# Patient Record
Sex: FEMALE | Race: WHITE | NOT HISPANIC OR LATINO | Employment: OTHER | URBAN - METROPOLITAN AREA
[De-identification: names, ages, dates, MRNs, and addresses within clinical notes are randomized per-mention and may not be internally consistent; named-entity substitution may affect disease eponyms.]

---

## 2017-01-13 DIAGNOSIS — R52 PAIN: ICD-10-CM

## 2017-01-13 RX ORDER — MELOXICAM 7.5 MG/1
TABLET ORAL
Qty: 90 TAB | Refills: 0 | Status: SHIPPED | OUTPATIENT
Start: 2017-01-13 | End: 2017-04-10 | Stop reason: SDUPTHER

## 2017-07-18 ENCOUNTER — HOSPITAL ENCOUNTER (OUTPATIENT)
Dept: LAB | Facility: MEDICAL CENTER | Age: 77
End: 2017-07-18
Attending: FAMILY MEDICINE
Payer: MEDICARE

## 2017-07-18 DIAGNOSIS — R73.01 ELEVATED FASTING GLUCOSE: ICD-10-CM

## 2017-07-18 DIAGNOSIS — E78.5 DYSLIPIDEMIA: ICD-10-CM

## 2017-07-18 DIAGNOSIS — D69.6 THROMBOCYTOPENIA (HCC): ICD-10-CM

## 2017-07-18 LAB
ALBUMIN SERPL BCP-MCNC: 4.3 G/DL (ref 3.2–4.9)
ALBUMIN/GLOB SERPL: 1.7 G/DL
ALP SERPL-CCNC: 67 U/L (ref 30–99)
ALT SERPL-CCNC: 18 U/L (ref 2–50)
ANION GAP SERPL CALC-SCNC: 6 MMOL/L (ref 0–11.9)
AST SERPL-CCNC: 20 U/L (ref 12–45)
BASOPHILS # BLD AUTO: 0.8 % (ref 0–1.8)
BASOPHILS # BLD: 0.06 K/UL (ref 0–0.12)
BILIRUB SERPL-MCNC: 1.6 MG/DL (ref 0.1–1.5)
BUN SERPL-MCNC: 25 MG/DL (ref 8–22)
CALCIUM SERPL-MCNC: 9.4 MG/DL (ref 8.5–10.5)
CHLORIDE SERPL-SCNC: 108 MMOL/L (ref 96–112)
CHOLEST SERPL-MCNC: 130 MG/DL (ref 100–199)
CO2 SERPL-SCNC: 27 MMOL/L (ref 20–33)
CREAT SERPL-MCNC: 0.76 MG/DL (ref 0.5–1.4)
EOSINOPHIL # BLD AUTO: 0.08 K/UL (ref 0–0.51)
EOSINOPHIL NFR BLD: 1.1 % (ref 0–6.9)
ERYTHROCYTE [DISTWIDTH] IN BLOOD BY AUTOMATED COUNT: 46.1 FL (ref 35.9–50)
EST. AVERAGE GLUCOSE BLD GHB EST-MCNC: 111 MG/DL
GFR SERPL CREATININE-BSD FRML MDRD: >60 ML/MIN/1.73 M 2
GLOBULIN SER CALC-MCNC: 2.5 G/DL (ref 1.9–3.5)
GLUCOSE SERPL-MCNC: 100 MG/DL (ref 65–99)
HBA1C MFR BLD: 5.5 % (ref 0–5.6)
HCT VFR BLD AUTO: 45.1 % (ref 37–47)
HDLC SERPL-MCNC: 59 MG/DL
HGB BLD-MCNC: 15 G/DL (ref 12–16)
IMM GRANULOCYTES # BLD AUTO: 0.02 K/UL (ref 0–0.11)
IMM GRANULOCYTES NFR BLD AUTO: 0.3 % (ref 0–0.9)
LDLC SERPL CALC-MCNC: 59 MG/DL
LYMPHOCYTES # BLD AUTO: 1.53 K/UL (ref 1–4.8)
LYMPHOCYTES NFR BLD: 21.6 % (ref 22–41)
MCH RBC QN AUTO: 31.1 PG (ref 27–33)
MCHC RBC AUTO-ENTMCNC: 33.3 G/DL (ref 33.6–35)
MCV RBC AUTO: 93.4 FL (ref 81.4–97.8)
MONOCYTES # BLD AUTO: 0.49 K/UL (ref 0–0.85)
MONOCYTES NFR BLD AUTO: 6.9 % (ref 0–13.4)
NEUTROPHILS # BLD AUTO: 4.89 K/UL (ref 2–7.15)
NEUTROPHILS NFR BLD: 69.3 % (ref 44–72)
NRBC # BLD AUTO: 0 K/UL
NRBC BLD AUTO-RTO: 0 /100 WBC
PLATELET # BLD AUTO: 127 K/UL (ref 164–446)
POTASSIUM SERPL-SCNC: 4.3 MMOL/L (ref 3.6–5.5)
PROT SERPL-MCNC: 6.8 G/DL (ref 6–8.2)
RBC # BLD AUTO: 4.83 M/UL (ref 4.2–5.4)
SODIUM SERPL-SCNC: 141 MMOL/L (ref 135–145)
TRIGL SERPL-MCNC: 60 MG/DL (ref 0–149)
WBC # BLD AUTO: 7.1 K/UL (ref 4.8–10.8)

## 2017-07-18 PROCEDURE — 83036 HEMOGLOBIN GLYCOSYLATED A1C: CPT

## 2017-07-18 PROCEDURE — 80061 LIPID PANEL: CPT

## 2017-07-18 PROCEDURE — 85025 COMPLETE CBC W/AUTO DIFF WBC: CPT

## 2017-07-18 PROCEDURE — 36415 COLL VENOUS BLD VENIPUNCTURE: CPT

## 2017-07-18 PROCEDURE — 80053 COMPREHEN METABOLIC PANEL: CPT

## 2017-07-19 ENCOUNTER — TELEPHONE (OUTPATIENT)
Dept: MEDICAL GROUP | Facility: PHYSICIAN GROUP | Age: 77
End: 2017-07-19

## 2017-07-19 NOTE — TELEPHONE ENCOUNTER
----- Message from Tonya Stack M.D. sent at 7/19/2017  8:29 AM PDT -----  Labs are stable.  No changes.

## 2017-11-18 NOTE — TELEPHONE ENCOUNTER
Was the patient seen in the last year in this department?no 09/20/16    Does patient have an active prescription for medications requested? No     Received Request Via: Patient

## 2017-11-20 RX ORDER — ALENDRONATE SODIUM 70 MG/1
70 TABLET ORAL
Qty: 12 TAB | Refills: 0 | Status: SHIPPED | OUTPATIENT
Start: 2017-11-20 | End: 2018-02-13 | Stop reason: SDUPTHER

## 2017-12-20 RX ORDER — ATORVASTATIN CALCIUM 10 MG/1
TABLET, FILM COATED ORAL
Qty: 90 TAB | Refills: 0 | Status: SHIPPED | OUTPATIENT
Start: 2017-12-20 | End: 2018-03-19 | Stop reason: SDUPTHER

## 2017-12-20 NOTE — TELEPHONE ENCOUNTER
Was the patient seen in the last year in this department? No    Does patient have an active prescription for medications requested? No     Received Request Via: Pharmacy      Pt met protocol?: No     OV 9/16       Lab Results  Component Value Date/Time   CHOLSTRLTOT 130 07/18/2017 0927   TRIGLYCERIDE 60 07/18/2017 0927   HDL 59 07/18/2017 0927   LDL 59 07/18/2017 0927

## 2018-02-13 ENCOUNTER — OFFICE VISIT (OUTPATIENT)
Dept: MEDICAL GROUP | Facility: PHYSICIAN GROUP | Age: 78
End: 2018-02-13
Payer: MEDICARE

## 2018-02-13 VITALS
HEIGHT: 67 IN | OXYGEN SATURATION: 99 % | WEIGHT: 114 LBS | DIASTOLIC BLOOD PRESSURE: 82 MMHG | BODY MASS INDEX: 17.89 KG/M2 | RESPIRATION RATE: 14 BRPM | SYSTOLIC BLOOD PRESSURE: 128 MMHG | HEART RATE: 84 BPM | TEMPERATURE: 97.6 F

## 2018-02-13 DIAGNOSIS — M81.0 AGE RELATED OSTEOPOROSIS, UNSPECIFIED PATHOLOGICAL FRACTURE PRESENCE: ICD-10-CM

## 2018-02-13 DIAGNOSIS — Z12.11 SCREENING FOR COLON CANCER: ICD-10-CM

## 2018-02-13 DIAGNOSIS — E78.2 MIXED HYPERLIPIDEMIA: ICD-10-CM

## 2018-02-13 DIAGNOSIS — D69.6 THROMBOCYTOPENIA (HCC): ICD-10-CM

## 2018-02-13 DIAGNOSIS — Z72.0 TOBACCO ABUSE: ICD-10-CM

## 2018-02-13 PROCEDURE — 99213 OFFICE O/P EST LOW 20 MIN: CPT | Performed by: FAMILY MEDICINE

## 2018-02-13 RX ORDER — ALENDRONATE SODIUM 70 MG/1
70 TABLET ORAL
Qty: 12 TAB | Refills: 4 | Status: SHIPPED | OUTPATIENT
Start: 2018-02-13 | End: 2019-04-11 | Stop reason: SDUPTHER

## 2018-02-13 ASSESSMENT — PATIENT HEALTH QUESTIONNAIRE - PHQ9: CLINICAL INTERPRETATION OF PHQ2 SCORE: 0

## 2018-02-13 NOTE — PROGRESS NOTES
Chief Complaint   Patient presents with   • Generalized Osteoarthrosis, Involving Hand     alendronate refill   • Immunizations     pt declined       HISTORY OF PRESENT ILLNESS: Patient is a 77 y.o. female established patient here today for the following concerns:    1. Screening for colon cancer  Due for colon cancer screening. Patient denies any changes in bowel habits no melena or hematochezia noted.    2. Age related osteoporosis, unspecified pathological fracture presence  Patient has history of osteoporosis currently on Fosamax 70 mg every 7 days. Good tolerability no GI side effects. Due for repeat bone density in 2 years. Denies any recent falls or fractures.    3. Mixed hyperlipidemia  Patient has history of hyperlipidemia on atorvastatin 10 mg. She will be due for recheck on her labs in August. Denies any chest pain myalgias.    4. Tobacco abuse  Patient continues to smoke a half a pack to 1 pack per day of cigarettes. She reports that she is still in the precontemplation stage citing that she is not ready to quit.    5. Thrombocytopenia (CMS-HCC)  Patient's history of thrombocytopenia noted on labs which as been chronic. She does occasionally take aspirin. There's been no bleeding or easy bruisability.      Past Medical, Social, and Family history reviewed and updated in EPIC    Allergies:Sulfa drugs    Current Outpatient Prescriptions   Medication Sig Dispense Refill   • Nutritional Supplements (ENSURE ACTIVE PO) Take  by mouth.     • alendronate (FOSAMAX) 70 MG Tab Take 1 Tab by mouth every 7 days. 12 Tab 4   • atorvastatin (LIPITOR) 10 MG Tab TAKE 1 TAB BY MOUTH EVERY DAY. TAKE 1 TAB BY MOUTH EVERY DAY. 90 Tab 0   • meloxicam (MOBIC) 7.5 MG Tab TAKE 1 TABLET BY MOUTH EVERY DAY AS NEEDED FOR MILD PAIN 90 Tab 0   • CALCIUM PO Take  by mouth.     • Cholecalciferol (VITAMIN D3) 3000 UNITS Tab Take  by mouth.     • Iron-Vitamins (GERITOL PO) Take  by mouth.     • meclizine (ANTIVERT) 25 MG TABS Take 1 Tab  "by mouth 3 times a day as needed. Indications: Sensation of Spinning or Whirling 30 Tab 0   • aspirin (ASA) 325 MG TABS Take 325 mg by mouth every day.     • Nutritional Supplements (BOOST HIGH PROTEIN) Liquid Take 1 Can by mouth 2 Times a Day. (Patient taking differently: Take 1 Can by mouth every day.) 60 Can 11     No current facility-administered medications for this visit.          ROS:  Review of Systems   Constitutional: Negative for fever, chills, weight loss and malaise/fatigue.   HENT: Negative for ear pain, nosebleeds, congestion, sore throat and neck pain.    Eyes: Negative for blurred vision.   Respiratory: Negative for cough, sputum production, shortness of breath and wheezing.    Cardiovascular: Negative for chest pain, palpitations,  and leg swelling.   Gastrointestinal: Negative for heartburn, nausea, vomiting, diarrhea and abdominal pain.   Genitourinary: Negative for dysuria, urgency and frequency.   Musculoskeletal: Negative for myalgias, back pain and joint pain.   Skin: Negative for rash and itching.   Neurological: Negative for dizziness, tingling, tremors, sensory change, focal weakness and headaches.   Endo/Heme/Allergies: Does not bruise/bleed easily.   Psychiatric/Behavioral: Negative for depression, anxiety, suicidal ideas, insomnia and memory loss.      Exam:  Blood pressure 128/82, pulse 84, temperature 36.4 °C (97.6 °F), resp. rate 14, height 1.702 m (5' 7.01\"), weight 51.7 kg (114 lb), SpO2 99 %.    General:  Well nourished, well developed in NAD  Head is grossly normal.  Neck: Supple without JVD   Pulmonary:  Normal effort.   Cardiovascular: Regular rate  Extremities: no clubbing, cyanosis, or edema.  Psych: affect appropriate      Please note that this dictation was created using voice recognition software. I have made every reasonable attempt to correct obvious errors, but I expect that there are errors of grammar and possibly content that I did not discover before finalizing the " note.    Assessment/Plan:  1. Screening for colon cancer  - OCCULT BLOOD FECES IMMUNOASSAY (FIT); Future    2. Age related osteoporosis, unspecified pathological fracture presence  Continue  - alendronate (FOSAMAX) 70 MG Tab; Take 1 Tab by mouth every 7 days.  Dispense: 12 Tab; Refill: 4  Check bone density in 2020  3. Mixed hyperlipidemia  Continue atorvastatin check labs  - COMP METABOLIC PANEL; Future  - LIPID PROFILE; Future    4. Tobacco abuse  Counseled today    5. Thrombocytopenia (CMS-HCC)  No evidence of bleeding or bruising, check  - CBC WITH DIFFERENTIAL; Future    Return in about 6 months (around 8/13/2018).

## 2018-02-15 ENCOUNTER — HOSPITAL ENCOUNTER (OUTPATIENT)
Facility: MEDICAL CENTER | Age: 78
End: 2018-02-15
Attending: FAMILY MEDICINE
Payer: MEDICARE

## 2018-02-15 PROCEDURE — 82274 ASSAY TEST FOR BLOOD FECAL: CPT

## 2018-02-22 DIAGNOSIS — Z12.11 SCREENING FOR COLON CANCER: ICD-10-CM

## 2018-02-23 LAB — HEMOCCULT STL QL IA: NEGATIVE

## 2018-02-27 ENCOUNTER — TELEPHONE (OUTPATIENT)
Dept: MEDICAL GROUP | Facility: PHYSICIAN GROUP | Age: 78
End: 2018-02-27

## 2018-02-27 NOTE — TELEPHONE ENCOUNTER
----- Message from Tonya Stack M.D. sent at 2/27/2018  9:30 AM PST -----  Your stool test was negative for microscopic blood.  Please repeat the test in one year.

## 2018-03-07 ENCOUNTER — PATIENT OUTREACH (OUTPATIENT)
Dept: HEALTH INFORMATION MANAGEMENT | Facility: OTHER | Age: 78
End: 2018-03-07

## 2018-03-07 NOTE — PROGRESS NOTES
1. Attempt #: 1    2. HealthConnect Verified: yes    3. Verify PCP: yes    4. Care Team Updated:       •   DME Company (gait device, O2, CPAP, etc.): YES       •   Other Specialists (eye doctor, derm, GYN, cardiology, endo, etc): YES    5.  Reviewed/Updated the following with patient:       •   Communication Preference Obtained? YES       •   Preferred Pharmacy? YES       •   Preferred Lab? YES       •   Family History (document living status of immediate family members and if + hx of cancer, diabetes, hypertension, hyperlipidemia, heart attack, stroke) NO WOULD LIKE TO GO OVER AT TIME OF APPOINTMENT    6. Blink Booking Activation: declined    7. Blink Booking Erasmo: no    8. Annual Wellness Visit Scheduling  Scheduling Status:Scheduled      9. Care Gap Scheduling (Attempt to Schedule EACH Overdue Care Gap!)     Health Maintenance Due   Topic Date Due   • Annual Wellness Visit  10/10/2014        Scheduled patient for Annual Wellness Visit    10. Patient was advised: “This is a free wellness visit. The provider will screen for medical conditions to help you stay healthy. If you have other concerns to address you may be asked to discuss these at a separate visit or there may be an additional fee.”     11. Patient was informed to arrive 15 min prior to their scheduled appointment and bring in their medication bottles.

## 2018-03-20 RX ORDER — ATORVASTATIN CALCIUM 10 MG/1
TABLET, FILM COATED ORAL
Qty: 90 TAB | Refills: 1 | Status: SHIPPED | OUTPATIENT
Start: 2018-03-20 | End: 2018-09-15 | Stop reason: SDUPTHER

## 2018-03-20 NOTE — TELEPHONE ENCOUNTER
Pt has had OV within the 12 month protocol and lipid panel is current. 6 month supply sent to pharmacy.   Lab Results   Component Value Date/Time    CHOLSTRLTOT 130 07/18/2017 09:27 AM    LDL 59 07/18/2017 09:27 AM    HDL 59 07/18/2017 09:27 AM    TRIGLYCERIDE 60 07/18/2017 09:27 AM       Lab Results   Component Value Date/Time    SODIUM 141 07/18/2017 09:27 AM    POTASSIUM 4.3 07/18/2017 09:27 AM    CHLORIDE 108 07/18/2017 09:27 AM    CO2 27 07/18/2017 09:27 AM    GLUCOSE 100 (H) 07/18/2017 09:27 AM    BUN 25 (H) 07/18/2017 09:27 AM    CREATININE 0.76 07/18/2017 09:27 AM     Lab Results   Component Value Date/Time    ALKPHOSPHAT 67 07/18/2017 09:27 AM    ASTSGOT 20 07/18/2017 09:27 AM    ALTSGPT 18 07/18/2017 09:27 AM    TBILIRUBIN 1.6 (H) 07/18/2017 09:27 AM

## 2018-03-20 NOTE — TELEPHONE ENCOUNTER
Was the patient seen in the last year in this department? Yes     Does patient have an active prescription for medications requested? No     Received Request Via: Pharmacy      Pt met protocol?: Yes pt last ov 2/2018   Lab Results  Component Value Date/Time   CHOLSTRLTOT 130 07/18/2017 0927   TRIGLYCERIDE 60 07/18/2017 0927   HDL 59 07/18/2017 0927   LDL 59 07/18/2017 0927

## 2018-03-21 RX ORDER — ATORVASTATIN CALCIUM 10 MG/1
TABLET, FILM COATED ORAL
Refills: 0 | OUTPATIENT
Start: 2018-03-21

## 2018-03-21 NOTE — TELEPHONE ENCOUNTER
* Atorvastatin was refilled today.*  Was the patient seen in the last year in this department? Yes     Does patient have an active prescription for medications requested? No     Received Request Via: Pharmacy    Pt met protocol?: Yes     Last OV 02/2018

## 2018-03-22 RX ORDER — MELOXICAM 7.5 MG/1
TABLET ORAL
Qty: 90 TAB | Refills: 0 | Status: SHIPPED | OUTPATIENT
Start: 2018-03-22 | End: 2019-07-05 | Stop reason: SDUPTHER

## 2018-05-08 NOTE — TELEPHONE ENCOUNTER
Patient came in with spouse to his appointment.  Patient asked about results.  Patient notified of results.

## 2018-06-06 ENCOUNTER — TELEPHONE (OUTPATIENT)
Dept: MEDICAL GROUP | Facility: PHYSICIAN GROUP | Age: 78
End: 2018-06-06

## 2018-06-06 NOTE — TELEPHONE ENCOUNTER
Future Appointments       Provider Department Center    6/12/2018 10:20 AM Tonya Stack M.D.; [facility]  [facility]    8/16/2018 9:40 AM Tonya Stack M.D. [facility]        ANNUAL WELLNESS VISIT PRE-VISIT PLANNING WITH OUTREACH    1.  Immunizations were updated in Epic using WebIZ?:Yes       •  WebIZ Recommendations: FLU, PREVNAR (PCV13) , TDAP and SHINGRIX (Shingles)       •  Is patient due for Tdap? YES. Patient was notified of copay/out of pocket cost.       •  Is patient due for Shingles?YES. Patient was notified of copay/out of pocket cost.    2.  MDX printed for Provider? YES     3.  Reviewed note from last office visit with PCP: YES 02/13/18    4.  If any orders were placed at last visit, do we have Results/Consult Notes?        •  Labs - Labs ordered, NOT completed. Patient advised to complete prior to next appointment.       •  Imaging - Imaging was not ordered at last office visit.       •  Referrals - No referrals were ordered at last office visit.    5.  Patient is due for the following Health Maintenance Topics:   Health Maintenance Due   Topic Date Due   • Annual Wellness Visit  10/10/2014       6.  Patient has the following Care Path diagnoses on Problem List:  NONE

## 2018-06-12 ENCOUNTER — OFFICE VISIT (OUTPATIENT)
Dept: MEDICAL GROUP | Facility: PHYSICIAN GROUP | Age: 78
End: 2018-06-12
Payer: MEDICARE

## 2018-06-12 VITALS
HEART RATE: 94 BPM | SYSTOLIC BLOOD PRESSURE: 134 MMHG | HEIGHT: 67 IN | DIASTOLIC BLOOD PRESSURE: 84 MMHG | WEIGHT: 111 LBS | RESPIRATION RATE: 16 BRPM | BODY MASS INDEX: 17.42 KG/M2 | TEMPERATURE: 98.4 F | OXYGEN SATURATION: 97 %

## 2018-06-12 DIAGNOSIS — Z72.0 TOBACCO ABUSE: ICD-10-CM

## 2018-06-12 DIAGNOSIS — R73.01 ELEVATED FASTING GLUCOSE: ICD-10-CM

## 2018-06-12 DIAGNOSIS — M15.9 PRIMARY OSTEOARTHRITIS INVOLVING MULTIPLE JOINTS: ICD-10-CM

## 2018-06-12 DIAGNOSIS — E78.5 DYSLIPIDEMIA: ICD-10-CM

## 2018-06-12 DIAGNOSIS — E55.9 VITAMIN D DEFICIENCY: ICD-10-CM

## 2018-06-12 DIAGNOSIS — M81.0 AGE RELATED OSTEOPOROSIS, UNSPECIFIED PATHOLOGICAL FRACTURE PRESENCE: ICD-10-CM

## 2018-06-12 DIAGNOSIS — D69.6 THROMBOCYTOPENIA (HCC): ICD-10-CM

## 2018-06-12 PROCEDURE — G0439 PPPS, SUBSEQ VISIT: HCPCS | Performed by: FAMILY MEDICINE

## 2018-06-12 ASSESSMENT — PATIENT HEALTH QUESTIONNAIRE - PHQ9: CLINICAL INTERPRETATION OF PHQ2 SCORE: 0

## 2018-06-12 ASSESSMENT — ENCOUNTER SYMPTOMS: GENERAL WELL-BEING: GOOD

## 2018-06-12 ASSESSMENT — ACTIVITIES OF DAILY LIVING (ADL): BATHING_REQUIRES_ASSISTANCE: 0

## 2018-06-12 NOTE — PROGRESS NOTES
Chief Complaint   Patient presents with   • Annual Exam         HPI:  Mayra is a 77 y.o. here for Medicare Annual Wellness Visit        Patient Active Problem List    Diagnosis Date Noted   • Thrombocytopenia (HCC) 02/15/2013     Priority: Medium   • Tobacco abuse 09/20/2016   • Vitamin D deficiency 11/18/2015   • Osteoporosis 11/11/2015   • Elevated fasting glucose 11/11/2015   • Dyslipidemia 02/15/2013   • Osteoarthritis 02/06/2013       Current Outpatient Prescriptions   Medication Sig Dispense Refill   • aspirin EC (ECOTRIN) 81 MG Tablet Delayed Response Take 81 mg by mouth every day.     • meloxicam (MOBIC) 7.5 MG Tab TAKE 1 TABLET BY MOUTH EVERY DAY AS NEEDED FOR MILD PAIN 90 Tab 0   • atorvastatin (LIPITOR) 10 MG Tab TAKE 1 TABLET BY MOUTH EVERY DAY. 90 Tab 1   • Nutritional Supplements (ENSURE ACTIVE PO) Take  by mouth.     • alendronate (FOSAMAX) 70 MG Tab Take 1 Tab by mouth every 7 days. 12 Tab 4   • CALCIUM PO Take  by mouth.     • Cholecalciferol (VITAMIN D3) 3000 UNITS Tab Take  by mouth.     • Iron-Vitamins (GERITOL PO) Take  by mouth.     • meclizine (ANTIVERT) 25 MG TABS Take 1 Tab by mouth 3 times a day as needed. Indications: Sensation of Spinning or Whirling 30 Tab 0   • Nutritional Supplements (BOOST HIGH PROTEIN) Liquid Take 1 Can by mouth 2 Times a Day. (Patient taking differently: Take 1 Can by mouth every day.) 60 Can 11   • aspirin (ASA) 325 MG TABS Take 325 mg by mouth every day.       No current facility-administered medications for this visit.         Patient is taking medications as noted in medication list.  Current supplements as per medication list.     Allergies: Sulfa drugs    Current social contact/activities: none     Is patient current with immunizations? Yes.    She  reports that she has been smoking Cigarettes.  She has a 88.50 pack-year smoking history. She has never used smokeless tobacco. She reports that she does not drink alcohol or use drugs.  Ready to quit: Not  Answered  Counseling given: Not Answered        DPA/Advanced directive: Patient does not have an Advanced Directive.  A packet and workshop information was given on Advanced Directives.    ROS:    Gait: Uses no assistive device   Ostomy: no   Other tubes: no   Amputations: no   Chronic oxygen use no   Last eye exam pt does not remember   Wears hearing aids: no   : Denies any urinary leakage during the last 6 months      Screening:        Depression Screening    Little interest or pleasure in doing things?  0 - not at all  Feeling down, depressed, or hopeless? 0 - not at all  Patient Health Questionnaire Score: 0    If depressive symptoms identified deferred to follow up visit unless specifically addressed in assessment and plan.    Interpretation of PHQ-9 Total Score   Score Severity   1-4 No Depression   5-9 Mild Depression   10-14 Moderate Depression   15-19 Moderately Severe Depression   20-27 Severe Depression    Screening for Cognitive Impairment    Three Minute Recall (leader, season, table)  3/3    Emilio clock face with all 12 numbers and set the hands to show 10 past 11.  Yes    If cognitive concerns identified, deferred for follow up unless specifically addressed in assessment and plan.    Fall Risk Assessment    Has the patient had two or more falls in the last year or any fall with injury in the last year?  No  If fall risk identified, deferred for follow up unless specifically addressed in assessment and plan.    Safety Assessment    Throw rugs on floor.  No  Handrails on all stairs.  Yes  Good lighting in all hallways.  Yes  Difficulty hearing.  No  Patient counseled about all safety risks that were identified.    Functional Assessment ADLs    Are there any barriers preventing you from cooking for yourself or meeting nutritional needs?  No.    Are there any barriers preventing you from driving safely or obtaining transportation?  No.    Are there any barriers preventing you from using a telephone or  calling for help?  No.    Are there any barriers preventing you from shopping?  No.    Are there any barriers preventing you from taking care of your own finances?  No.    Are there any barriers preventing you from managing your medications?  No.    Are there any barriers preventing you from showering, bathing or dressing yourself?  No.    Are you currently engaging in any exercise or physical activity?  Yes.  walking  What is your perception of your health?  Good.    Health Maintenance Summary                Annual Wellness Visit Overdue 10/10/2014      Done 10/9/2013     IMM INFLUENZA Postponed 10/16/2018 Originally 9/1/2018. Patient Refused     Patient Declined 11/11/2015      Patient has more history with this topic...    IMM DTaP/Tdap/Td Vaccine Postponed 2/11/2020 Originally 9[birth year]3/1959. Patient Refused    IMM PNEUMOCOCCAL 65+ (ADULT) LOW/MEDIUM RISK SERIES Postponed 10/8/2020 Originally 9/3/2006. Patient Refused    COLON CANCER SCREENING ANNUAL FIT Next Due 2/15/2019      Done 2/15/2018 OCCULT BLOOD FECES IMMUNOASSAY     Patient has more history with this topic...    BONE DENSITY Next Due 9/10/2020      Done 9/10/2015 DS-BONE DENSITY STUDY (DEXA)          Patient Care Team:  Tonya Stack M.D. as PCP - General (Family Medicine)    Social History   Substance Use Topics   • Smoking status: Current Every Day Smoker     Packs/day: 1.50     Years: 59.00     Types: Cigarettes   • Smokeless tobacco: Never Used   • Alcohol use No     Family History   Problem Relation Age of Onset   • Heart Disease Mother      MI   • Hypertension Mother    • Cancer Father      colon   • Heart Disease Sister      pacer placed   • Stroke Brother 50   • Heart Disease Brother 60     pace maker     She  has a past medical history of Dyslipidemia (2/15/2013); Osteoarthritis (2/6/2013); Osteoporosis; and Thrombocytopenia (HCC) (2/15/2013).   Past Surgical History:   Procedure Laterality Date   • ORIF, HUMERUS      age 9   • TUBAL COAGULATION  "LAPAROSCOPIC BILATERAL             Exam:     Blood pressure 134/84, pulse 94, temperature 36.9 °C (98.4 °F), resp. rate 16, height 1.702 m (5' 7.01\"), weight 50.3 kg (111 lb), SpO2 97 %. Body mass index is 17.38 kg/m².    Hearing excellent.    Dentition good  Alert, oriented in no acute distress.  Eye contact is good, speech goal directed, affect calm      Assessment and Plan. The following treatment and monitoring plan is recommended:    1. Dyslipidemia  Continue atorvastatin, at least annual labs.     2. Elevated fasting glucose  Noted. contineu to monitor.  No s/sx of DM2    3. Primary osteoarthritis involving multiple joints  Controlled with meloxicam, monitor renal function.    4. Age related osteoporosis, unspecified pathological fracture presence  Continue fosamax    5. Thrombocytopenia (HCC)  Stable.  No evidence of bleeding.  May need to watch NSAIDS for bleeding risk.     6. Tobacco abuse  Counseled.     7. Vitamin D deficiency  Continue OTC vit D supplement    Services suggested: No services needed at this time  Health Care Screening recommendations as per orders if indicated.  Referrals offered: PT/OT/Nutrition counseling/Behavioral Health/Smoking cessation as per orders if indicated.    Discussion today about general wellness and lifestyle habits:    · Prevent falls and reduce trip hazards; Cautioned about securing or removing rugs.  · Have a working fire alarm and carbon monoxide detector;   · Engage in regular physical activity and social activities       Follow-up: 6-12 months.     "

## 2018-07-23 ENCOUNTER — HOSPITAL ENCOUNTER (OUTPATIENT)
Dept: LAB | Facility: MEDICAL CENTER | Age: 78
End: 2018-07-23
Attending: FAMILY MEDICINE
Payer: MEDICARE

## 2018-07-23 DIAGNOSIS — D69.6 THROMBOCYTOPENIA (HCC): ICD-10-CM

## 2018-07-23 DIAGNOSIS — E78.2 MIXED HYPERLIPIDEMIA: ICD-10-CM

## 2018-07-23 LAB
ALBUMIN SERPL BCP-MCNC: 4.4 G/DL (ref 3.2–4.9)
ALBUMIN/GLOB SERPL: 2.2 G/DL
ALP SERPL-CCNC: 62 U/L (ref 30–99)
ALT SERPL-CCNC: 13 U/L (ref 2–50)
ANION GAP SERPL CALC-SCNC: 7 MMOL/L (ref 0–11.9)
AST SERPL-CCNC: 18 U/L (ref 12–45)
BASOPHILS # BLD AUTO: 0.7 % (ref 0–1.8)
BASOPHILS # BLD: 0.06 K/UL (ref 0–0.12)
BILIRUB SERPL-MCNC: 1.5 MG/DL (ref 0.1–1.5)
BUN SERPL-MCNC: 21 MG/DL (ref 8–22)
CALCIUM SERPL-MCNC: 9 MG/DL (ref 8.5–10.5)
CHLORIDE SERPL-SCNC: 108 MMOL/L (ref 96–112)
CHOLEST SERPL-MCNC: 123 MG/DL (ref 100–199)
CO2 SERPL-SCNC: 28 MMOL/L (ref 20–33)
CREAT SERPL-MCNC: 0.76 MG/DL (ref 0.5–1.4)
EOSINOPHIL # BLD AUTO: 0.08 K/UL (ref 0–0.51)
EOSINOPHIL NFR BLD: 1 % (ref 0–6.9)
ERYTHROCYTE [DISTWIDTH] IN BLOOD BY AUTOMATED COUNT: 46.4 FL (ref 35.9–50)
GLOBULIN SER CALC-MCNC: 2 G/DL (ref 1.9–3.5)
GLUCOSE SERPL-MCNC: 99 MG/DL (ref 65–99)
HCT VFR BLD AUTO: 44.1 % (ref 37–47)
HDLC SERPL-MCNC: 54 MG/DL
HGB BLD-MCNC: 14.3 G/DL (ref 12–16)
IMM GRANULOCYTES # BLD AUTO: 0.03 K/UL (ref 0–0.11)
IMM GRANULOCYTES NFR BLD AUTO: 0.4 % (ref 0–0.9)
LDLC SERPL CALC-MCNC: 56 MG/DL
LYMPHOCYTES # BLD AUTO: 1.32 K/UL (ref 1–4.8)
LYMPHOCYTES NFR BLD: 16.3 % (ref 22–41)
MCH RBC QN AUTO: 30.5 PG (ref 27–33)
MCHC RBC AUTO-ENTMCNC: 32.4 G/DL (ref 33.6–35)
MCV RBC AUTO: 94 FL (ref 81.4–97.8)
MONOCYTES # BLD AUTO: 0.53 K/UL (ref 0–0.85)
MONOCYTES NFR BLD AUTO: 6.5 % (ref 0–13.4)
NEUTROPHILS # BLD AUTO: 6.1 K/UL (ref 2–7.15)
NEUTROPHILS NFR BLD: 75.1 % (ref 44–72)
NRBC # BLD AUTO: 0 K/UL
NRBC BLD-RTO: 0 /100 WBC
PLATELET # BLD AUTO: 137 K/UL (ref 164–446)
PMV BLD AUTO: 14.1 FL (ref 9–12.9)
POTASSIUM SERPL-SCNC: 4.1 MMOL/L (ref 3.6–5.5)
PROT SERPL-MCNC: 6.4 G/DL (ref 6–8.2)
RBC # BLD AUTO: 4.69 M/UL (ref 4.2–5.4)
SODIUM SERPL-SCNC: 143 MMOL/L (ref 135–145)
TRIGL SERPL-MCNC: 63 MG/DL (ref 0–149)
WBC # BLD AUTO: 8.1 K/UL (ref 4.8–10.8)

## 2018-07-23 PROCEDURE — 80061 LIPID PANEL: CPT

## 2018-07-23 PROCEDURE — 36415 COLL VENOUS BLD VENIPUNCTURE: CPT

## 2018-07-23 PROCEDURE — 85025 COMPLETE CBC W/AUTO DIFF WBC: CPT

## 2018-07-23 PROCEDURE — 80053 COMPREHEN METABOLIC PANEL: CPT

## 2018-07-24 ENCOUNTER — TELEPHONE (OUTPATIENT)
Dept: MEDICAL GROUP | Facility: PHYSICIAN GROUP | Age: 78
End: 2018-07-24

## 2018-09-17 RX ORDER — ATORVASTATIN CALCIUM 10 MG/1
TABLET, FILM COATED ORAL
Qty: 90 TAB | Refills: 1 | Status: SHIPPED | OUTPATIENT
Start: 2018-09-17 | End: 2019-03-25 | Stop reason: SDUPTHER

## 2018-09-17 NOTE — TELEPHONE ENCOUNTER
Pt has had OV within the 12 month protocol and lipid panel is current. 6 month supply sent to pharmacy.   Lab Results   Component Value Date/Time    CHOLSTRLTOT 123 07/23/2018 08:49 AM    LDL 56 07/23/2018 08:49 AM    HDL 54 07/23/2018 08:49 AM    TRIGLYCERIDE 63 07/23/2018 08:49 AM       Lab Results   Component Value Date/Time    SODIUM 143 07/23/2018 08:49 AM    POTASSIUM 4.1 07/23/2018 08:49 AM    CHLORIDE 108 07/23/2018 08:49 AM    CO2 28 07/23/2018 08:49 AM    GLUCOSE 99 07/23/2018 08:49 AM    BUN 21 07/23/2018 08:49 AM    CREATININE 0.76 07/23/2018 08:49 AM     Lab Results   Component Value Date/Time    ALKPHOSPHAT 62 07/23/2018 08:49 AM    ASTSGOT 18 07/23/2018 08:49 AM    ALTSGPT 13 07/23/2018 08:49 AM    TBILIRUBIN 1.5 07/23/2018 08:49 AM

## 2019-03-26 RX ORDER — ATORVASTATIN CALCIUM 10 MG/1
TABLET, FILM COATED ORAL
Qty: 90 TAB | Refills: 1 | Status: SHIPPED | OUTPATIENT
Start: 2019-03-26 | End: 2019-04-29 | Stop reason: SDUPTHER

## 2019-04-11 DIAGNOSIS — M81.0 AGE RELATED OSTEOPOROSIS, UNSPECIFIED PATHOLOGICAL FRACTURE PRESENCE: ICD-10-CM

## 2019-04-11 NOTE — TELEPHONE ENCOUNTER
Was the patient seen in the last year in this department? Yes    Does patient have an active prescription for medications requested? No     Received Request Via: Pharmacy    Pt met protocol?: No     Last OV 06/12/2018

## 2019-04-12 RX ORDER — ALENDRONATE SODIUM 70 MG/1
70 TABLET ORAL
Qty: 12 TAB | Refills: 1 | Status: SHIPPED | OUTPATIENT
Start: 2019-04-12 | End: 2019-09-23 | Stop reason: SDUPTHER

## 2019-04-18 ENCOUNTER — TELEPHONE (OUTPATIENT)
Dept: MEDICAL GROUP | Facility: PHYSICIAN GROUP | Age: 79
End: 2019-04-18

## 2019-04-18 NOTE — TELEPHONE ENCOUNTER
1. Caller Name: Mayra                                           Call Back Number: [phone] (home)         Patient approves a detailed voicemail message: N\A    LVM for pt to call the office to schedule AWV and to go over care gaps.

## 2019-04-29 NOTE — TELEPHONE ENCOUNTER
Was the patient seen in the last year in this department? Yes    Does patient have an active prescription for medications requested? Yes  [employer] asking for 100 day supply    Received Request Via: Insurance

## 2019-04-30 NOTE — TELEPHONE ENCOUNTER
See MA's notes below, pt has met protocol, OV 06/12/2018      Lab Results  Component Value Date/Time   CHOLSTRLTOT 123 07/23/2018 0849   TRIGLYCERIDE 63 07/23/2018 0849   HDL 54 07/23/2018 0849   LDL 56 07/23/2018 0849

## 2019-05-01 RX ORDER — ATORVASTATIN CALCIUM 10 MG/1
10 TABLET, FILM COATED ORAL
Qty: 100 TAB | Refills: 0 | Status: SHIPPED | OUTPATIENT
Start: 2019-05-01 | End: 2019-10-02 | Stop reason: SDUPTHER

## 2019-05-01 NOTE — TELEPHONE ENCOUNTER
Insurance covers 100 day supply. RX sent with request to inactivate any orders for 30 or 90 day supply.

## 2019-05-03 ENCOUNTER — PATIENT OUTREACH (OUTPATIENT)
Dept: HEALTH INFORMATION MANAGEMENT | Facility: OTHER | Age: 79
End: 2019-05-03

## 2019-05-03 NOTE — PROGRESS NOTES
Outcome: Left Message    Please transfer to Patient Outreach Team at [phone] when patient returns call.    WebIZ Checked & Epic Updated:  yes  PCV-13 (Prevnar 13)   CPOX (Varicella)   Influenza w/preserv.   Tdap   Zoster Franco (Shingrix)       HealthConnect Verified: yes    Attempt # 1

## 2019-07-05 RX ORDER — MELOXICAM 7.5 MG/1
7.5 TABLET ORAL DAILY
Qty: 90 TAB | Refills: 1 | Status: SHIPPED | OUTPATIENT
Start: 2019-07-05 | End: 2020-01-03

## 2019-09-23 DIAGNOSIS — M81.0 AGE RELATED OSTEOPOROSIS, UNSPECIFIED PATHOLOGICAL FRACTURE PRESENCE: ICD-10-CM

## 2019-09-23 RX ORDER — ALENDRONATE SODIUM 70 MG/1
70 TABLET ORAL
Qty: 12 TAB | Refills: 0 | Status: SHIPPED | OUTPATIENT
Start: 2019-09-23 | End: 2019-12-13

## 2019-09-23 NOTE — TELEPHONE ENCOUNTER
Was the patient seen in the last year in this department? No     Does patient have an active prescription for medications requested? No     Received Request Via: Pharmacy      Pt met protocol?: No   Pt last ov 6/2018   Lab Results   Component Value Date/Time    SODIUM 143 07/23/2018 08:49 AM    POTASSIUM 4.1 07/23/2018 08:49 AM    CHLORIDE 108 07/23/2018 08:49 AM    CO2 28 07/23/2018 08:49 AM    GLUCOSE 99 07/23/2018 08:49 AM    BUN 21 07/23/2018 08:49 AM    CREATININE 0.76 07/23/2018 08:49 AM

## 2019-10-02 DIAGNOSIS — D69.6 THROMBOCYTOPENIA (HCC): ICD-10-CM

## 2019-10-02 DIAGNOSIS — E78.5 DYSLIPIDEMIA: ICD-10-CM

## 2019-10-02 NOTE — TELEPHONE ENCOUNTER
Was the patient seen in the last year in this department? No     Does patient have an active prescription for medications requested? No     Received Request Via: Pharmacy    Pt met protocol?: No     Last OV 06/12/18    Lab Results   Component Value Date/Time    CHOLSTRLTOT 123 07/23/2018 0849    TRIGLYCERIDE 63 07/23/2018 0849    HDL 54 07/23/2018 0849    LDL 56 07/23/2018 0849

## 2019-10-03 RX ORDER — ATORVASTATIN CALCIUM 10 MG/1
TABLET, FILM COATED ORAL
Qty: 90 TAB | Refills: 0 | Status: SHIPPED | OUTPATIENT
Start: 2019-10-03 | End: 2019-10-08 | Stop reason: SDUPTHER

## 2019-10-07 ENCOUNTER — TELEPHONE (OUTPATIENT)
Dept: MEDICAL GROUP | Facility: PHYSICIAN GROUP | Age: 79
End: 2019-10-07

## 2019-10-07 NOTE — TELEPHONE ENCOUNTER
Future Appointments       Provider Department Center    10/16/2019 9:00 AM Tonya Stack M.D.; [facility]        ANNUAL WELLNESS VISIT PRE-VISIT PLANNING WITHOUT OUTREACH    1.  Reviewed note from last office visit with PCP: YES    2.  If any orders were placed at last visit, do we have Results/Consult Notes?        •  Labs - Labs ordered, NOT completed. Patient advised to complete prior to next appointment.        •  Imaging - Imaging was not ordered at last office visit.       •  Referrals - No referrals were ordered at last office visit.    3.  Immunizations were updated in [place] using WebIZ?: Yes       •  WebIZ Recommendations: FLU, PREVNAR (PCV13) , TDAP, SHINGRIX (Shingles) and CPOX        •  Is patient due for Tdap? NO       •  Is patient due for Shingrix? YES. Patient was not notified of copay/out of pocket cost.     4.  Patient is due for the following Health Maintenance Topics:   Health Maintenance Due   Topic Date Due   • IMM ZOSTER VACCINES (1 of 2) 09/03/1991   • IMM PNEUMOCOCCAL VACCINE: 65+ Years (1 of 2 - PCV13) 09/03/2006   • Annual Wellness Visit  10/10/2014   • COLON CANCER SCREENING ANNUAL FIT  02/15/2019   • IMM INFLUENZA (1) 09/01/2019     5.  Reviewed/Updated the following with patient:       •   Preferred Pharmacy? YES       •   Preferred Lab? YES       •   Preferred Communication? YES       •   Allergies? YES       •   Medications? YES. Was Abstract Encounter opened and chart updated? YES       •   Social History? YES. Was Abstract Encounter opened and chart updated? YES       •   Family History (document living status of immediate family members and if + hx of  cancer, diabetes, hypertension, hyperlipidemia, heart attack, stroke) YES. Was Abstract Encounter opened and chart updated? YES    6.  Care Team Updated:       •   DME Company (gait device, O2, CPAP, etc.): NO       •   Other Specialists (eye doctor, derm, GYN, cardiology, endo,  etc): YES    7. Orders for overdue Health Maintenance topics pended in Pre-Charting? NO    8.  Patient has the following Care Path diagnoses on Problem List:  NONE    9.  Patient was advised: “This is a free wellness visit. The provider will screen for medical conditions to help you stay healthy. If you have other concerns to address you may be asked to discuss these at a separate visit or there may be an additional fee.”     10.  Patient was informed to arrive 15 min prior to their scheduled appointment and bring in their medication bottles.  10/09/2019-ALEC

## 2019-10-08 NOTE — TELEPHONE ENCOUNTER
*PER NEW INS PROTOCOL, NEEDS TO BE DONE FOR 100DAYS SUPPLY*  Was the patient seen in the last year in this department? No     Does patient have an active prescription for medications requested? Yes    Received Request Via: Pharmacy      Pt met protocol?: Yes    LAST OV 06/12/2018, NEXT APPT 10/16/2019    Lab Results   Component Value Date/Time    CHOLSTRLTOT 123 07/23/2018 08:49 AM    LDL 56 07/23/2018 08:49 AM    HDL 54 07/23/2018 08:49 AM    TRIGLYCERIDE 63 07/23/2018 08:49 AM       Lab Results   Component Value Date/Time    SODIUM 143 07/23/2018 08:49 AM    POTASSIUM 4.1 07/23/2018 08:49 AM    CHLORIDE 108 07/23/2018 08:49 AM    CO2 28 07/23/2018 08:49 AM    GLUCOSE 99 07/23/2018 08:49 AM    BUN 21 07/23/2018 08:49 AM    CREATININE 0.76 07/23/2018 08:49 AM     Lab Results   Component Value Date/Time    ALKPHOSPHAT 62 07/23/2018 08:49 AM    ASTSGOT 18 07/23/2018 08:49 AM    ALTSGPT 13 07/23/2018 08:49 AM    TBILIRUBIN 1.5 07/23/2018 08:49 AM

## 2019-10-09 RX ORDER — ATORVASTATIN CALCIUM 10 MG/1
10 TABLET, FILM COATED ORAL
Qty: 100 TAB | Refills: 0 | Status: SHIPPED | OUTPATIENT
Start: 2019-10-09 | End: 2020-01-20

## 2019-10-16 ENCOUNTER — OFFICE VISIT (OUTPATIENT)
Dept: MEDICAL GROUP | Facility: PHYSICIAN GROUP | Age: 79
End: 2019-10-16
Payer: MEDICARE

## 2019-10-16 VITALS
WEIGHT: 110 LBS | HEART RATE: 76 BPM | DIASTOLIC BLOOD PRESSURE: 72 MMHG | OXYGEN SATURATION: 95 % | RESPIRATION RATE: 18 BRPM | HEIGHT: 67 IN | BODY MASS INDEX: 17.27 KG/M2 | TEMPERATURE: 99 F | SYSTOLIC BLOOD PRESSURE: 130 MMHG

## 2019-10-16 DIAGNOSIS — M81.0 AGE RELATED OSTEOPOROSIS, UNSPECIFIED PATHOLOGICAL FRACTURE PRESENCE: ICD-10-CM

## 2019-10-16 DIAGNOSIS — M15.9 PRIMARY OSTEOARTHRITIS INVOLVING MULTIPLE JOINTS: ICD-10-CM

## 2019-10-16 DIAGNOSIS — R73.01 ELEVATED FASTING GLUCOSE: ICD-10-CM

## 2019-10-16 DIAGNOSIS — D69.6 THROMBOCYTOPENIA (HCC): ICD-10-CM

## 2019-10-16 DIAGNOSIS — E55.9 VITAMIN D DEFICIENCY: ICD-10-CM

## 2019-10-16 DIAGNOSIS — E78.5 DYSLIPIDEMIA: ICD-10-CM

## 2019-10-16 DIAGNOSIS — Z72.0 TOBACCO ABUSE: ICD-10-CM

## 2019-10-16 DIAGNOSIS — Z12.11 SCREENING FOR COLON CANCER: ICD-10-CM

## 2019-10-16 PROCEDURE — G0439 PPPS, SUBSEQ VISIT: HCPCS | Performed by: FAMILY MEDICINE

## 2019-10-16 ASSESSMENT — ENCOUNTER SYMPTOMS: GENERAL WELL-BEING: GOOD

## 2019-10-16 ASSESSMENT — PATIENT HEALTH QUESTIONNAIRE - PHQ9: CLINICAL INTERPRETATION OF PHQ2 SCORE: 0

## 2019-10-16 ASSESSMENT — ACTIVITIES OF DAILY LIVING (ADL): BATHING_REQUIRES_ASSISTANCE: 0

## 2019-10-16 NOTE — PROGRESS NOTES
Chief Complaint   Patient presents with   • Annual Wellness Visit         HPI:  Vargas is a 79 y.o. here for Medicare Annual Wellness Visit      Patient Active Problem List    Diagnosis Date Noted   • Thrombocytopenia (HCC) 02/15/2013     Priority: Medium   • Tobacco abuse 09/20/2016   • Vitamin D deficiency 11/18/2015   • Osteoporosis 11/11/2015   • Elevated fasting glucose 11/11/2015   • Dyslipidemia 02/15/2013   • Osteoarthritis 02/06/2013       Current Outpatient Medications   Medication Sig Dispense Refill   • atorvastatin (LIPITOR) 10 MG Tab Take 1 Tab by mouth every day. 100 Tab 0   • alendronate (FOSAMAX) 70 MG Tab TAKE 1 TAB BY MOUTH EVERY 7 DAYS. 12 Tab 0   • meloxicam (MOBIC) 7.5 MG Tab Take 1 Tab by mouth every day. TAKE 1 TABLET BY MOUTH EVERY DAY AS NEEDED FOR MILD PAIN 90 Tab 1   • aspirin EC (ECOTRIN) 81 MG Tablet Delayed Response Take 81 mg by mouth every day.     • Nutritional Supplements (ENSURE ACTIVE PO) Take  by mouth.     • CALCIUM PO Take  by mouth.     • Cholecalciferol (VITAMIN D3) 3000 UNITS Tab Take  by mouth.     • Iron-Vitamins (GERITOL PO) Take  by mouth.     • meclizine (ANTIVERT) 25 MG TABS Take 1 Tab by mouth 3 times a day as needed. Indications: Sensation of Spinning or Whirling (Patient not taking: Reported on 10/10/2019) 30 Tab 0     No current facility-administered medications for this visit.         Patient is taking medications as noted in medication list.  Current supplements as per medication list.     Allergies: Sulfa drugs    Current social contact/activities: Visiting with friends and family, feeds stray cats,     Is patient current with immunizations? No, due for FLU and PREVNAR (PCV13) . Patient is interested in receiving NONE today.    She  reports that she has been smoking cigarettes. She has a 88.50 pack-year smoking history. She has never used smokeless tobacco. She reports that she does not drink alcohol or use drugs.  Ready to quit: Not Answered  Counseling given:  Not Answered        DPA/Advanced directive: Patient has Advanced Directive on file.     ROS:    Gait: Uses no assistive device   Ostomy: No   Other tubes: No   Amputations: No   Chronic oxygen use No   Last eye exam Unknown  Wears hearing aids: No   : Denies any urinary leakage during the last 6 months  Annual Health Assessment Questions:    1.  Are you currently engaging in any exercise or physical activity? No    2.  How would you describe your mood or emotional well-being today? good    3.  Have you had any falls in the last year? No    4.  Have you noticed any problems with your balance or had difficulty walking? No    5.  In the last six months have you experienced any leakage of urine? No    6. DPA/Advanced Directive: Patient has Advanced Directive on file.     Screening:        Depression Screening    Little interest or pleasure in doing things?  0 - not at all  Feeling down, depressed, or hopeless? 0 - not at all  Patient Health Questionnaire Score: 0    If depressive symptoms identified deferred to follow up visit unless specifically addressed in assessment and plan.    Interpretation of PHQ-9 Total Score   Score Severity   1-4 No Depression   5-9 Mild Depression   10-14 Moderate Depression   15-19 Moderately Severe Depression   20-27 Severe Depression    Screening for Cognitive Impairment    Three Minute Recall (village, kitchen, baby)  3/3 Village, Kitchen, Baby  Emilio clock face with all 12 numbers and set the hands to show 10 past 10.  Yes 10:10 5/5  If cognitive concerns identified, deferred for follow up unless specifically addressed in assessment and plan.    Fall Risk Assessment    Has the patient had two or more falls in the last year or any fall with injury in the last year?  No  If fall risk identified, deferred for follow up unless specifically addressed in assessment and plan.    Safety Assessment    Throw rugs on floor.  Yes  Handrails on all stairs.  Yes  Good lighting in all hallways.   Yes  Difficulty hearing.  No  Patient counseled about all safety risks that were identified.    Functional Assessment ADLs    Are there any barriers preventing you from cooking for yourself or meeting nutritional needs?  No.    Are there any barriers preventing you from driving safely or obtaining transportation?  No.    Are there any barriers preventing you from using a telephone or calling for help?  No.    Are there any barriers preventing you from shopping?  No.    Are there any barriers preventing you from taking care of your own finances?  No.    Are there any barriers preventing you from managing your medications?  No.    Are there any barriers preventing you from showering, bathing or dressing yourself?  No.    Are you currently engaging in any exercise or physical activity?  Yes.  House work and takes care of , walking around the house.  What is your perception of your health?  Good.    Health Maintenance Summary                IMM ZOSTER VACCINES Overdue 9/3/1991     IMM PNEUMOCOCCAL VACCINE: 65+ Years Overdue 9/3/2006     Annual Wellness Visit Overdue 10/10/2014      Done 10/9/2013     COLON CANCER SCREENING ANNUAL FIT Overdue 2/15/2019      Done 2/15/2018 OCCULT BLOOD FECES IMMUNOASSAY     Patient has more history with this topic...    IMM INFLUENZA Overdue 9/1/2019      Patient Declined 11/11/2015      Patient has more history with this topic...    IMM DTaP/Tdap/Td Vaccine Postponed 2/11/2020 Originally 9/3/1959. Patient Refused    BONE DENSITY Next Due 9/10/2020      Done 9/10/2015 DS-BONE DENSITY STUDY (DEXA)          Patient Care Team:  Tonya Stack M.D. as PCP - General (Family Medicine)  Skyler Snyder Ass't as      Social History     Tobacco Use   • Smoking status: Current Every Day Smoker     Packs/day: 1.50     Years: 59.00     Pack years: 88.50     Types: Cigarettes   • Smokeless tobacco: Never Used   Substance Use Topics   • Alcohol use: No      "Alcohol/week: 0.0 oz   • Drug use: No     Family History   Problem Relation Age of Onset   • Heart Disease Mother         MI   • Hypertension Mother    • Cancer Father         colon   • Heart Disease Sister         pacer placed   • Stroke Brother 50   • Heart Disease Brother 60        pace maker   • No Known Problems Brother    • Other Son         Back issues work related   • No Known Problems Son    • No Known Problems Son    • No Known Problems Daughter      She  has a past medical history of Dyslipidemia (2/15/2013), Osteoarthritis (2/6/2013), Osteoporosis, and Thrombocytopenia (HCC) (2/15/2013).   Past Surgical History:   Procedure Laterality Date   • ORIF, HUMERUS      age 9   • TUBAL COAGULATION LAPAROSCOPIC BILATERAL             Exam:     /72 (BP Location: Left arm, Patient Position: Sitting, BP Cuff Size: Adult)   Pulse 76   Temp 37.2 °C (99 °F) (Temporal)   Resp 18   Ht 1.702 m (5' 7\")   Wt 49.9 kg (110 lb)   SpO2 95%  Body mass index is 17.23 kg/m².    Hearing excellent.    Dentition good  Alert, oriented in no acute distress.  Eye contact is good, speech goal directed, affect calm      Assessment and Plan. The following treatment and monitoring plan is recommended:    1. Primary osteoarthritis involving multiple joints  stable    2. Dyslipidemia  Controlled with atorvastatin    3. Age related osteoporosis, unspecified pathological fracture presence  Continue bisphosphonate, DXA due next year.     4. Elevated fasting glucose  Noted previously.  Continue to monitor.  No treatment with medication needed. Balanced diet.     5. Vitamin D deficiency  Continue vit D     6. Tobacco abuse  Smoking cessation encouraged.      7. Thrombocytopenia (HCC)  No bleeding, continue to monitor     Services suggested: No services needed at this time  Health Care Screening recommendations as per orders if indicated.  Referrals offered: PT/OT/Nutrition counseling/Behavioral Health/Smoking cessation as per orders if " indicated.    Discussion today about general wellness and lifestyle habits:    · Prevent falls and reduce trip hazards; Cautioned about securing or removing rugs.  · Have a working fire alarm and carbon monoxide detector;   · Engage in regular physical activity and social activities     Declines vaccines.        Follow-up: 3-6 months

## 2019-12-13 DIAGNOSIS — M81.0 AGE RELATED OSTEOPOROSIS, UNSPECIFIED PATHOLOGICAL FRACTURE PRESENCE: ICD-10-CM

## 2019-12-13 RX ORDER — ALENDRONATE SODIUM 70 MG/1
TABLET ORAL
Qty: 12 TAB | Refills: 1 | Status: SHIPPED | OUTPATIENT
Start: 2019-12-13 | End: 2020-06-16

## 2020-01-01 ENCOUNTER — APPOINTMENT (OUTPATIENT)
Dept: CARDIOLOGY | Facility: MEDICAL CENTER | Age: 80
End: 2020-01-01
Attending: INTERNAL MEDICINE
Payer: MEDICARE

## 2020-01-01 ENCOUNTER — APPOINTMENT (OUTPATIENT)
Dept: RADIOLOGY | Facility: MEDICAL CENTER | Age: 80
End: 2020-01-01
Attending: HOSPITALIST
Payer: MEDICARE

## 2020-01-01 ENCOUNTER — APPOINTMENT (OUTPATIENT)
Dept: RADIOLOGY | Facility: MEDICAL CENTER | Age: 80
End: 2020-01-01
Attending: EMERGENCY MEDICINE
Payer: MEDICARE

## 2020-01-01 ENCOUNTER — HOSPITAL ENCOUNTER (OUTPATIENT)
Facility: MEDICAL CENTER | Age: 80
End: 2020-12-18
Attending: EMERGENCY MEDICINE | Admitting: HOSPITALIST
Payer: MEDICARE

## 2020-01-01 ENCOUNTER — OFFICE VISIT (OUTPATIENT)
Dept: CARDIOLOGY | Facility: MEDICAL CENTER | Age: 80
End: 2020-01-01
Payer: MEDICARE

## 2020-01-01 ENCOUNTER — TELEPHONE (OUTPATIENT)
Dept: LAB | Facility: MEDICAL CENTER | Age: 80
End: 2020-01-01

## 2020-01-01 ENCOUNTER — TELEPHONE (OUTPATIENT)
Dept: CARDIOLOGY | Facility: MEDICAL CENTER | Age: 80
End: 2020-01-01

## 2020-01-01 VITALS
TEMPERATURE: 98.7 F | WEIGHT: 100.53 LBS | BODY MASS INDEX: 15.78 KG/M2 | SYSTOLIC BLOOD PRESSURE: 142 MMHG | DIASTOLIC BLOOD PRESSURE: 83 MMHG | HEART RATE: 88 BPM | HEIGHT: 67 IN | OXYGEN SATURATION: 98 % | RESPIRATION RATE: 16 BRPM

## 2020-01-01 VITALS
SYSTOLIC BLOOD PRESSURE: 152 MMHG | DIASTOLIC BLOOD PRESSURE: 100 MMHG | BODY MASS INDEX: 16.29 KG/M2 | WEIGHT: 103.8 LBS | HEIGHT: 67 IN | RESPIRATION RATE: 17 BRPM | HEART RATE: 65 BPM

## 2020-01-01 DIAGNOSIS — R94.31 EKG ABNORMALITIES: ICD-10-CM

## 2020-01-01 DIAGNOSIS — M81.0 AGE RELATED OSTEOPOROSIS, UNSPECIFIED PATHOLOGICAL FRACTURE PRESENCE: ICD-10-CM

## 2020-01-01 DIAGNOSIS — E78.5 DYSLIPIDEMIA: ICD-10-CM

## 2020-01-01 DIAGNOSIS — I10 ESSENTIAL HYPERTENSION: ICD-10-CM

## 2020-01-01 DIAGNOSIS — Z72.0 TOBACCO ABUSE: ICD-10-CM

## 2020-01-01 DIAGNOSIS — F17.200 TOBACCO DEPENDENCE: ICD-10-CM

## 2020-01-01 DIAGNOSIS — I44.7 LBBB (LEFT BUNDLE BRANCH BLOCK): ICD-10-CM

## 2020-01-01 DIAGNOSIS — R42 DIZZINESS: ICD-10-CM

## 2020-01-01 DIAGNOSIS — I50.9 STAGE B ACC/AHA ASYMPTOMATIC HEART FAILURE (HCC): ICD-10-CM

## 2020-01-01 LAB
ALBUMIN SERPL BCP-MCNC: 5.1 G/DL (ref 3.2–4.9)
ALBUMIN/GLOB SERPL: 1.6 G/DL
ALP SERPL-CCNC: 98 U/L (ref 30–99)
ALT SERPL-CCNC: 12 U/L (ref 2–50)
ANION GAP SERPL CALC-SCNC: 12 MMOL/L (ref 7–16)
APPEARANCE UR: CLEAR
APPEARANCE UR: CLEAR
AST SERPL-CCNC: 15 U/L (ref 12–45)
BACTERIA #/AREA URNS HPF: NEGATIVE /HPF
BASOPHILS # BLD AUTO: 0.6 % (ref 0–1.8)
BASOPHILS # BLD: 0.04 K/UL (ref 0–0.12)
BILIRUB SERPL-MCNC: 1.7 MG/DL (ref 0.1–1.5)
BILIRUB UR QL STRIP.AUTO: NEGATIVE
BUN SERPL-MCNC: 17 MG/DL (ref 8–22)
CALCIUM SERPL-MCNC: 9.8 MG/DL (ref 8.5–10.5)
CHLORIDE SERPL-SCNC: 100 MMOL/L (ref 96–112)
CO2 SERPL-SCNC: 25 MMOL/L (ref 20–33)
COLOR UR AUTO: YELLOW
COLOR UR: YELLOW
COVID ORDER STATUS COVID19: NORMAL
CREAT SERPL-MCNC: 0.71 MG/DL (ref 0.5–1.4)
EKG IMPRESSION: NORMAL
EOSINOPHIL # BLD AUTO: 0.02 K/UL (ref 0–0.51)
EOSINOPHIL NFR BLD: 0.3 % (ref 0–6.9)
EPI CELLS #/AREA URNS HPF: NEGATIVE /HPF
ERYTHROCYTE [DISTWIDTH] IN BLOOD BY AUTOMATED COUNT: 45.8 FL (ref 35.9–50)
GLOBULIN SER CALC-MCNC: 3.1 G/DL (ref 1.9–3.5)
GLUCOSE BLD-MCNC: 88 MG/DL (ref 65–99)
GLUCOSE SERPL-MCNC: 97 MG/DL (ref 65–99)
GLUCOSE UR QL STRIP.AUTO: NEGATIVE MG/DL
GLUCOSE UR STRIP.AUTO-MCNC: NEGATIVE MG/DL
HCT VFR BLD AUTO: 50.9 % (ref 37–47)
HGB BLD-MCNC: 16.8 G/DL (ref 12–16)
HYALINE CASTS #/AREA URNS LPF: NORMAL /LPF
IMM GRANULOCYTES # BLD AUTO: 0.01 K/UL (ref 0–0.11)
IMM GRANULOCYTES NFR BLD AUTO: 0.1 % (ref 0–0.9)
KETONES UR QL STRIP.AUTO: NEGATIVE MG/DL
KETONES UR STRIP.AUTO-MCNC: NEGATIVE MG/DL
LEUKOCYTE ESTERASE UR QL STRIP.AUTO: ABNORMAL
LEUKOCYTE ESTERASE UR QL STRIP.AUTO: NEGATIVE
LV EJECT FRACT  99904: 30
LV EJECT FRACT MOD 2C 99903: 51.44
LV EJECT FRACT MOD 4C 99902: 41.42
LV EJECT FRACT MOD BP 99901: 45.2
LYMPHOCYTES # BLD AUTO: 1.31 K/UL (ref 1–4.8)
LYMPHOCYTES NFR BLD: 19 % (ref 22–41)
MCH RBC QN AUTO: 29.6 PG (ref 27–33)
MCHC RBC AUTO-ENTMCNC: 33 G/DL (ref 33.6–35)
MCV RBC AUTO: 89.8 FL (ref 81.4–97.8)
MICRO URNS: ABNORMAL
MONOCYTES # BLD AUTO: 0.35 K/UL (ref 0–0.85)
MONOCYTES NFR BLD AUTO: 5.1 % (ref 0–13.4)
NEUTROPHILS # BLD AUTO: 5.17 K/UL (ref 2–7.15)
NEUTROPHILS NFR BLD: 74.9 % (ref 44–72)
NITRITE UR QL STRIP.AUTO: NEGATIVE
NITRITE UR QL STRIP.AUTO: NEGATIVE
NRBC # BLD AUTO: 0 K/UL
NRBC BLD-RTO: 0 /100 WBC
NT-PROBNP SERPL IA-MCNC: 5057 PG/ML (ref 0–125)
PH UR STRIP.AUTO: 7 [PH] (ref 5–8)
PH UR STRIP.AUTO: 7 [PH] (ref 5–8)
PLATELET # BLD AUTO: 154 K/UL (ref 164–446)
PMV BLD AUTO: 12.5 FL (ref 9–12.9)
POTASSIUM SERPL-SCNC: 4 MMOL/L (ref 3.6–5.5)
PROT SERPL-MCNC: 8.2 G/DL (ref 6–8.2)
PROT UR QL STRIP: NEGATIVE MG/DL
PROT UR QL STRIP: NEGATIVE MG/DL
RBC # BLD AUTO: 5.67 M/UL (ref 4.2–5.4)
RBC # URNS HPF: NORMAL /HPF
RBC UR QL AUTO: ABNORMAL
RBC UR QL AUTO: NEGATIVE
SARS-COV-2 RNA RESP QL NAA+PROBE: NOTDETECTED
SODIUM SERPL-SCNC: 137 MMOL/L (ref 135–145)
SP GR UR STRIP.AUTO: 1.01
SP GR UR STRIP.AUTO: 1.01 (ref 1–1.03)
SPECIMEN SOURCE: NORMAL
TROPONIN T SERPL-MCNC: 13 NG/L (ref 6–19)
TROPONIN T SERPL-MCNC: 13 NG/L (ref 6–19)
TROPONIN T SERPL-MCNC: 15 NG/L (ref 6–19)
TROPONIN T SERPL-MCNC: 25 NG/L (ref 6–19)
UROBILINOGEN UR STRIP.AUTO-MCNC: 0.2 MG/DL
WBC # BLD AUTO: 6.9 K/UL (ref 4.8–10.8)
WBC #/AREA URNS HPF: NORMAL /HPF

## 2020-01-01 PROCEDURE — 700111 HCHG RX REV CODE 636 W/ 250 OVERRIDE (IP): Performed by: HOSPITALIST

## 2020-01-01 PROCEDURE — 700101 HCHG RX REV CODE 250: Performed by: EMERGENCY MEDICINE

## 2020-01-01 PROCEDURE — A9270 NON-COVERED ITEM OR SERVICE: HCPCS | Performed by: HOSPITALIST

## 2020-01-01 PROCEDURE — 700102 HCHG RX REV CODE 250 W/ 637 OVERRIDE(OP): Performed by: STUDENT IN AN ORGANIZED HEALTH CARE EDUCATION/TRAINING PROGRAM

## 2020-01-01 PROCEDURE — 84484 ASSAY OF TROPONIN QUANT: CPT

## 2020-01-01 PROCEDURE — 96372 THER/PROPH/DIAG INJ SC/IM: CPT

## 2020-01-01 PROCEDURE — G0378 HOSPITAL OBSERVATION PER HR: HCPCS

## 2020-01-01 PROCEDURE — C9803 HOPD COVID-19 SPEC COLLECT: HCPCS | Performed by: EMERGENCY MEDICINE

## 2020-01-01 PROCEDURE — 93306 TTE W/DOPPLER COMPLETE: CPT

## 2020-01-01 PROCEDURE — 83880 ASSAY OF NATRIURETIC PEPTIDE: CPT

## 2020-01-01 PROCEDURE — 96374 THER/PROPH/DIAG INJ IV PUSH: CPT | Mod: XU

## 2020-01-01 PROCEDURE — 700102 HCHG RX REV CODE 250 W/ 637 OVERRIDE(OP): Performed by: HOSPITALIST

## 2020-01-01 PROCEDURE — 81001 URINALYSIS AUTO W/SCOPE: CPT

## 2020-01-01 PROCEDURE — 99214 OFFICE O/P EST MOD 30 MIN: CPT | Performed by: INTERNAL MEDICINE

## 2020-01-01 PROCEDURE — 80053 COMPREHEN METABOLIC PANEL: CPT

## 2020-01-01 PROCEDURE — 99205 OFFICE O/P NEW HI 60 MIN: CPT | Performed by: INTERNAL MEDICINE

## 2020-01-01 PROCEDURE — 700102 HCHG RX REV CODE 250 W/ 637 OVERRIDE(OP): Performed by: INTERNAL MEDICINE

## 2020-01-01 PROCEDURE — A9270 NON-COVERED ITEM OR SERVICE: HCPCS | Performed by: NURSE PRACTITIONER

## 2020-01-01 PROCEDURE — 93005 ELECTROCARDIOGRAM TRACING: CPT

## 2020-01-01 PROCEDURE — 700111 HCHG RX REV CODE 636 W/ 250 OVERRIDE (IP)

## 2020-01-01 PROCEDURE — 700102 HCHG RX REV CODE 250 W/ 637 OVERRIDE(OP): Performed by: NURSE PRACTITIONER

## 2020-01-01 PROCEDURE — 93306 TTE W/DOPPLER COMPLETE: CPT | Mod: 26 | Performed by: INTERNAL MEDICINE

## 2020-01-01 PROCEDURE — U0003 INFECTIOUS AGENT DETECTION BY NUCLEIC ACID (DNA OR RNA); SEVERE ACUTE RESPIRATORY SYNDROME CORONAVIRUS 2 (SARS-COV-2) (CORONAVIRUS DISEASE [COVID-19]), AMPLIFIED PROBE TECHNIQUE, MAKING USE OF HIGH THROUGHPUT TECHNOLOGIES AS DESCRIBED BY CMS-2020-01-R: HCPCS

## 2020-01-01 PROCEDURE — 93005 ELECTROCARDIOGRAM TRACING: CPT | Performed by: EMERGENCY MEDICINE

## 2020-01-01 PROCEDURE — 82962 GLUCOSE BLOOD TEST: CPT

## 2020-01-01 PROCEDURE — 70450 CT HEAD/BRAIN W/O DYE: CPT

## 2020-01-01 PROCEDURE — 99203 OFFICE O/P NEW LOW 30 MIN: CPT | Performed by: NURSE PRACTITIONER

## 2020-01-01 PROCEDURE — A9270 NON-COVERED ITEM OR SERVICE: HCPCS | Performed by: STUDENT IN AN ORGANIZED HEALTH CARE EDUCATION/TRAINING PROGRAM

## 2020-01-01 PROCEDURE — A9502 TC99M TETROFOSMIN: HCPCS

## 2020-01-01 PROCEDURE — A9270 NON-COVERED ITEM OR SERVICE: HCPCS | Performed by: INTERNAL MEDICINE

## 2020-01-01 PROCEDURE — 96376 TX/PRO/DX INJ SAME DRUG ADON: CPT | Mod: XU

## 2020-01-01 PROCEDURE — 97161 PT EVAL LOW COMPLEX 20 MIN: CPT

## 2020-01-01 PROCEDURE — 85025 COMPLETE CBC W/AUTO DIFF WBC: CPT

## 2020-01-01 PROCEDURE — 36415 COLL VENOUS BLD VENIPUNCTURE: CPT

## 2020-01-01 PROCEDURE — 99215 OFFICE O/P EST HI 40 MIN: CPT | Mod: 25 | Performed by: INTERNAL MEDICINE

## 2020-01-01 PROCEDURE — 81002 URINALYSIS NONAUTO W/O SCOPE: CPT

## 2020-01-01 PROCEDURE — 71045 X-RAY EXAM CHEST 1 VIEW: CPT

## 2020-01-01 PROCEDURE — 99220 PR INITIAL OBSERVATION CARE,LEVL III: CPT | Performed by: HOSPITALIST

## 2020-01-01 PROCEDURE — 99285 EMERGENCY DEPT VISIT HI MDM: CPT

## 2020-01-01 RX ORDER — ATORVASTATIN CALCIUM 10 MG/1
TABLET, FILM COATED ORAL
Qty: 100 TAB | Refills: 0 | Status: ON HOLD | OUTPATIENT
Start: 2020-01-01 | End: 2020-01-01

## 2020-01-01 RX ORDER — LISINOPRIL 2.5 MG/1
2.5 TABLET ORAL DAILY
Qty: 30 TAB | Refills: 0 | Status: SHIPPED | OUTPATIENT
Start: 2020-01-01 | End: 2020-01-01

## 2020-01-01 RX ORDER — LISINOPRIL 5 MG/1
2.5 TABLET ORAL
Status: DISCONTINUED | OUTPATIENT
Start: 2020-01-01 | End: 2020-01-01 | Stop reason: HOSPADM

## 2020-01-01 RX ORDER — LABETALOL HYDROCHLORIDE 5 MG/ML
10 INJECTION, SOLUTION INTRAVENOUS ONCE
Status: DISCONTINUED | OUTPATIENT
Start: 2020-01-01 | End: 2020-01-01

## 2020-01-01 RX ORDER — ATORVASTATIN CALCIUM 40 MG/1
40 TABLET, FILM COATED ORAL
Qty: 30 TAB | Refills: 5 | Status: SHIPPED | OUTPATIENT
Start: 2020-01-01 | End: 2021-01-01

## 2020-01-01 RX ORDER — LISINOPRIL 5 MG/1
5 TABLET ORAL DAILY
Qty: 30 TAB | Refills: 5 | Status: SHIPPED | OUTPATIENT
Start: 2020-01-01 | End: 2021-01-01

## 2020-01-01 RX ORDER — OMEPRAZOLE 20 MG/1
20 CAPSULE, DELAYED RELEASE ORAL 2 TIMES DAILY
Status: DISCONTINUED | OUTPATIENT
Start: 2020-01-01 | End: 2020-01-01

## 2020-01-01 RX ORDER — ALENDRONATE SODIUM 70 MG/1
TABLET ORAL
Qty: 12 TAB | Refills: 0 | Status: SHIPPED | OUTPATIENT
Start: 2020-01-01 | End: 2021-01-01

## 2020-01-01 RX ORDER — DIGOXIN 125 MCG
125 TABLET ORAL DAILY
Status: DISCONTINUED | OUTPATIENT
Start: 2020-01-01 | End: 2020-01-01

## 2020-01-01 RX ORDER — B1/B2/B3/B5/B6/IRON/METH/CHOLN 2.5-18/15
5 LIQUID (ML) ORAL DAILY
COMMUNITY
End: 2021-01-01

## 2020-01-01 RX ORDER — METOPROLOL TARTRATE 1 MG/ML
5 INJECTION, SOLUTION INTRAVENOUS ONCE
Status: COMPLETED | OUTPATIENT
Start: 2020-01-01 | End: 2020-01-01

## 2020-01-01 RX ORDER — METOPROLOL SUCCINATE 25 MG/1
12.5 TABLET, EXTENDED RELEASE ORAL DAILY
Qty: 30 TAB | Refills: 0 | Status: SHIPPED | OUTPATIENT
Start: 2020-01-01 | End: 2020-01-01

## 2020-01-01 RX ORDER — ASPIRIN 81 MG/1
81 TABLET ORAL DAILY
Qty: 30 TAB | Refills: 0 | Status: SHIPPED | OUTPATIENT
Start: 2020-01-01

## 2020-01-01 RX ORDER — NICOTINE 21 MG/24HR
21 PATCH, TRANSDERMAL 24 HOURS TRANSDERMAL
Status: DISCONTINUED | OUTPATIENT
Start: 2020-01-01 | End: 2020-01-01 | Stop reason: HOSPADM

## 2020-01-01 RX ORDER — LABETALOL HYDROCHLORIDE 5 MG/ML
10 INJECTION, SOLUTION INTRAVENOUS EVERY 4 HOURS PRN
Status: DISCONTINUED | OUTPATIENT
Start: 2020-01-01 | End: 2020-01-01 | Stop reason: HOSPADM

## 2020-01-01 RX ORDER — AMOXICILLIN 250 MG
2 CAPSULE ORAL 2 TIMES DAILY
Status: DISCONTINUED | OUTPATIENT
Start: 2020-01-01 | End: 2020-01-01 | Stop reason: HOSPADM

## 2020-01-01 RX ORDER — ATORVASTATIN CALCIUM 40 MG/1
40 TABLET, FILM COATED ORAL
Qty: 30 TAB | Refills: 0 | Status: SHIPPED | OUTPATIENT
Start: 2020-01-01 | End: 2020-01-01

## 2020-01-01 RX ORDER — METOPROLOL SUCCINATE 25 MG/1
12.5 TABLET, EXTENDED RELEASE ORAL DAILY
Qty: 30 TAB | Refills: 2 | Status: SHIPPED | OUTPATIENT
Start: 2020-01-01 | End: 2021-01-01

## 2020-01-01 RX ORDER — ONDANSETRON 2 MG/ML
4 INJECTION INTRAMUSCULAR; INTRAVENOUS EVERY 4 HOURS PRN
Status: DISCONTINUED | OUTPATIENT
Start: 2020-01-01 | End: 2020-01-01 | Stop reason: HOSPADM

## 2020-01-01 RX ORDER — IBUPROFEN 200 MG
200 TABLET ORAL EVERY 6 HOURS PRN
COMMUNITY
End: 2021-01-01

## 2020-01-01 RX ORDER — BISACODYL 10 MG
10 SUPPOSITORY, RECTAL RECTAL
Status: DISCONTINUED | OUTPATIENT
Start: 2020-01-01 | End: 2020-01-01 | Stop reason: HOSPADM

## 2020-01-01 RX ORDER — ONDANSETRON 4 MG/1
4 TABLET, ORALLY DISINTEGRATING ORAL EVERY 4 HOURS PRN
Status: DISCONTINUED | OUTPATIENT
Start: 2020-01-01 | End: 2020-01-01 | Stop reason: HOSPADM

## 2020-01-01 RX ORDER — ALENDRONATE SODIUM 70 MG/1
70 TABLET ORAL
Qty: 4 TAB | Refills: 0 | Status: SHIPPED | OUTPATIENT
Start: 2020-01-01 | End: 2020-01-01

## 2020-01-01 RX ORDER — MECLIZINE HYDROCHLORIDE 25 MG/1
25 TABLET ORAL 3 TIMES DAILY PRN
Status: DISCONTINUED | OUTPATIENT
Start: 2020-01-01 | End: 2020-01-01 | Stop reason: HOSPADM

## 2020-01-01 RX ORDER — ATORVASTATIN CALCIUM 40 MG/1
40 TABLET, FILM COATED ORAL
Status: DISCONTINUED | OUTPATIENT
Start: 2020-01-01 | End: 2020-01-01 | Stop reason: HOSPADM

## 2020-01-01 RX ORDER — ATORVASTATIN CALCIUM 10 MG/1
10 TABLET, FILM COATED ORAL
Status: DISCONTINUED | OUTPATIENT
Start: 2020-01-01 | End: 2020-01-01

## 2020-01-01 RX ORDER — METOPROLOL SUCCINATE 25 MG/1
12.5 TABLET, EXTENDED RELEASE ORAL
Status: DISCONTINUED | OUTPATIENT
Start: 2020-01-01 | End: 2020-01-01 | Stop reason: HOSPADM

## 2020-01-01 RX ORDER — POLYETHYLENE GLYCOL 3350 17 G/17G
1 POWDER, FOR SOLUTION ORAL
Status: DISCONTINUED | OUTPATIENT
Start: 2020-01-01 | End: 2020-01-01 | Stop reason: HOSPADM

## 2020-01-01 RX ORDER — LISINOPRIL 20 MG/1
20 TABLET ORAL
Status: DISCONTINUED | OUTPATIENT
Start: 2020-01-01 | End: 2020-01-01

## 2020-01-01 RX ORDER — DIGOXIN 125 MCG
125 TABLET ORAL DAILY
Status: DISCONTINUED | OUTPATIENT
Start: 2020-01-01 | End: 2020-01-01 | Stop reason: HOSPADM

## 2020-01-01 RX ORDER — REGADENOSON 0.08 MG/ML
INJECTION, SOLUTION INTRAVENOUS
Status: COMPLETED
Start: 2020-01-01 | End: 2020-01-01

## 2020-01-01 RX ORDER — OMEPRAZOLE 20 MG/1
20 CAPSULE, DELAYED RELEASE ORAL DAILY
Status: DISCONTINUED | OUTPATIENT
Start: 2020-01-01 | End: 2020-01-01 | Stop reason: HOSPADM

## 2020-01-01 RX ADMIN — NICOTINE TRANSDERMAL SYSTEM 21 MG: 21 PATCH, EXTENDED RELEASE TRANSDERMAL at 04:44

## 2020-01-01 RX ADMIN — ASPIRIN 81 MG: 81 TABLET, COATED ORAL at 08:45

## 2020-01-01 RX ADMIN — METOPROLOL TARTRATE 5 MG: 1 INJECTION, SOLUTION INTRAVENOUS at 14:50

## 2020-01-01 RX ADMIN — ENOXAPARIN SODIUM 40 MG: 40 INJECTION SUBCUTANEOUS at 05:32

## 2020-01-01 RX ADMIN — DOCUSATE SODIUM 50 MG AND SENNOSIDES 8.6 MG 2 TABLET: 8.6; 5 TABLET, FILM COATED ORAL at 18:43

## 2020-01-01 RX ADMIN — LISINOPRIL 2.5 MG: 5 TABLET ORAL at 04:31

## 2020-01-01 RX ADMIN — REGADENOSON 0.4 MG: 0.08 INJECTION, SOLUTION INTRAVENOUS at 10:13

## 2020-01-01 RX ADMIN — ENOXAPARIN SODIUM 40 MG: 40 INJECTION SUBCUTANEOUS at 04:32

## 2020-01-01 RX ADMIN — DIGOXIN 125 MCG: 125 TABLET ORAL at 10:48

## 2020-01-01 RX ADMIN — METOPROLOL TARTRATE 5 MG: 1 INJECTION, SOLUTION INTRAVENOUS at 12:43

## 2020-01-01 RX ADMIN — ATORVASTATIN CALCIUM 40 MG: 40 TABLET, FILM COATED ORAL at 18:08

## 2020-01-01 RX ADMIN — MECLIZINE HYDROCHLORIDE 25 MG: 25 TABLET ORAL at 04:39

## 2020-01-01 RX ADMIN — NICOTINE TRANSDERMAL SYSTEM 21 MG: 21 PATCH, EXTENDED RELEASE TRANSDERMAL at 18:08

## 2020-01-01 RX ADMIN — MECLIZINE HYDROCHLORIDE 25 MG: 25 TABLET ORAL at 13:02

## 2020-01-01 RX ADMIN — ATORVASTATIN CALCIUM 10 MG: 10 TABLET, FILM COATED ORAL at 18:43

## 2020-01-01 RX ADMIN — OMEPRAZOLE 20 MG: 20 CAPSULE, DELAYED RELEASE ORAL at 18:43

## 2020-01-01 RX ADMIN — METOPROLOL SUCCINATE 25 MG: 25 TABLET, EXTENDED RELEASE ORAL at 10:47

## 2020-01-01 RX ADMIN — LISINOPRIL 20 MG: 20 TABLET ORAL at 18:53

## 2020-01-01 RX ADMIN — ASPIRIN 325 MG: 325 TABLET, COATED ORAL at 04:33

## 2020-01-01 RX ADMIN — ONDANSETRON 4 MG: 4 TABLET, ORALLY DISINTEGRATING ORAL at 04:40

## 2020-01-01 SDOH — HEALTH STABILITY: MENTAL HEALTH: HOW OFTEN DO YOU HAVE A DRINK CONTAINING ALCOHOL?: NEVER

## 2020-01-01 SDOH — HEALTH STABILITY: MENTAL HEALTH: HOW OFTEN DO YOU HAVE 6 OR MORE DRINKS ON ONE OCCASION?: NEVER

## 2020-01-01 SDOH — ECONOMIC STABILITY: TRANSPORTATION INSECURITY
IN THE PAST 12 MONTHS, HAS THE LACK OF TRANSPORTATION KEPT YOU FROM MEDICAL APPOINTMENTS OR FROM GETTING MEDICATIONS?: NO

## 2020-01-01 SDOH — ECONOMIC STABILITY: TRANSPORTATION INSECURITY
IN THE PAST 12 MONTHS, HAS LACK OF TRANSPORTATION KEPT YOU FROM MEETINGS, WORK, OR FROM GETTING THINGS NEEDED FOR DAILY LIVING?: NO

## 2020-01-01 SDOH — ECONOMIC STABILITY: FOOD INSECURITY: WITHIN THE PAST 12 MONTHS, THE FOOD YOU BOUGHT JUST DIDN'T LAST AND YOU DIDN'T HAVE MONEY TO GET MORE.: NEVER TRUE

## 2020-01-01 SDOH — ECONOMIC STABILITY: FOOD INSECURITY: WITHIN THE PAST 12 MONTHS, YOU WORRIED THAT YOUR FOOD WOULD RUN OUT BEFORE YOU GOT MONEY TO BUY MORE.: NEVER TRUE

## 2020-01-01 ASSESSMENT — ENCOUNTER SYMPTOMS
HEARTBURN: 0
COUGH: 0
CLAUDICATION: 0
NAUSEA: 0
ORTHOPNEA: 0
VOMITING: 0
CHILLS: 0
WEIGHT LOSS: 0
COUGH: 0
DIZZINESS: 1
HEADACHES: 0
CLAUDICATION: 0
WHEEZING: 0
VOMITING: 0
BRUISES/BLEEDS EASILY: 0
SHORTNESS OF BREATH: 0
MYALGIAS: 0
FEVER: 0
SHORTNESS OF BREATH: 1
TREMORS: 0
CHILLS: 0
LIGHT-HEADEDNESS: 0
CHEST TIGHTNESS: 0
SPUTUM PRODUCTION: 0
TINGLING: 0
BRUISES/BLEEDS EASILY: 0
PALPITATIONS: 0
BLOOD IN STOOL: 0
COUGH: 0
PALPITATIONS: 0
SPEECH CHANGE: 0
VOMITING: 0
CHILLS: 0
NAUSEA: 0
NAUSEA: 0
BLURRED VISION: 0
COUGH: 0
BACK PAIN: 0
PALPITATIONS: 0
NECK PAIN: 0
VOMITING: 0
FEVER: 0
SENSORY CHANGE: 0
FEVER: 0
EYES NEGATIVE: 1
DIZZINESS: 1
SHORTNESS OF BREATH: 0
WHEEZING: 0
LOSS OF CONSCIOUSNESS: 0
WEIGHT LOSS: 0
DIZZINESS: 1
TINGLING: 0
MYALGIAS: 0
PND: 0
CHEST TIGHTNESS: 0
DIZZINESS: 1
FEVER: 0
LIGHT-HEADEDNESS: 0
CHILLS: 0
PALPITATIONS: 0
NAUSEA: 0
SHORTNESS OF BREATH: 0
HEMOPTYSIS: 0

## 2020-01-01 ASSESSMENT — COGNITIVE AND FUNCTIONAL STATUS - GENERAL
SUGGESTED CMS G CODE MODIFIER MOBILITY: CI
MOBILITY SCORE: 23
CLIMB 3 TO 5 STEPS WITH RAILING: A LITTLE
MOBILITY SCORE: 24
SUGGESTED CMS G CODE MODIFIER MOBILITY: CH
SUGGESTED CMS G CODE MODIFIER MOBILITY: CH
SUGGESTED CMS G CODE MODIFIER DAILY ACTIVITY: CH
MOBILITY SCORE: 24
DAILY ACTIVITIY SCORE: 24

## 2020-01-01 ASSESSMENT — MINNESOTA LIVING WITH HEART FAILURE QUESTIONNAIRE (MLHF)
GIVING YOU SIDE EFFECTS FROM TREATMENTS: 0
DIFFICULTY WITH RECREATIONAL PASTIMES, SPORTS, HOBBIES: 0
COSTING YOU MONEY FOR MEDICAL CARE: 5
TOTAL_SCORE: 37
SWELLING IN ANKLES OR LEGS: 0
DIFFICULTY WORKING TO EARN A LIVING: 0
HAVING TO SIT OR LIE DOWN DURING THE DAY: 2
WALKING ABOUT OR CLIMBING STAIRS DIFFICULT: 0
DIFFICULTY TO CONCENTRATE OR REMEMBERING THINGS: 2
LOSS OF SELF CONTROL IN YOUR LIFE: 3
WORKING AROUND THE HOUSE OR YARD DIFFICULT: 2
DIFFICULTY SOCIALIZING WITH FAMILY OR FRIENDS: 2
MAKING YOU FEEL DEPRESSED: 0
DIFFICULTY SLEEPING WELL AT NIGHT: 0
DIFFICULTY GOING AWAY FROM HOME: 4
EATING LESS FOODS YOU LIKE: 2
TIRED, FATIGUED OR LOW ON ENERGY: 5
MAKING YOU WORRY: 5
MAKING YOU SHORT OF BREATH: 0
DIFFICULTY WITH SEXUAL ACTIVITIES: 0
MAKING YOU STAY IN A HOSPITAL: 5
FEELING LIKE A BURDEN TO FAMILY AND FRIENDS: 0

## 2020-01-01 ASSESSMENT — GAIT ASSESSMENTS: GAIT LEVEL OF ASSIST: UNABLE TO PARTICIPATE

## 2020-01-01 ASSESSMENT — LIFESTYLE VARIABLES: SUBSTANCE_ABUSE: 1

## 2020-01-01 ASSESSMENT — PAIN DESCRIPTION - PAIN TYPE
TYPE: ACUTE PAIN

## 2020-01-01 ASSESSMENT — FIBROSIS 4 INDEX
FIB4 SCORE: 2
FIB4 SCORE: 2.22

## 2020-01-03 RX ORDER — MELOXICAM 7.5 MG/1
TABLET ORAL
Qty: 90 TAB | Refills: 0 | Status: SHIPPED | OUTPATIENT
Start: 2020-01-03 | End: 2021-01-01

## 2020-05-05 ENCOUNTER — PATIENT OUTREACH (OUTPATIENT)
Dept: HEALTH INFORMATION MANAGEMENT | Facility: OTHER | Age: 80
End: 2020-05-05

## 2020-05-05 NOTE — PROGRESS NOTES
Declined to schedule AWV at this time would rather wait until Sept. Requested call back in Aug to schedule.

## 2020-06-12 DIAGNOSIS — M81.0 AGE RELATED OSTEOPOROSIS, UNSPECIFIED PATHOLOGICAL FRACTURE PRESENCE: ICD-10-CM

## 2020-06-16 RX ORDER — ALENDRONATE SODIUM 70 MG/1
TABLET ORAL
Qty: 12 TAB | Refills: 0 | Status: SHIPPED | OUTPATIENT
Start: 2020-06-16 | End: 2020-08-18

## 2020-07-15 ENCOUNTER — HOSPITAL ENCOUNTER (OUTPATIENT)
Dept: LAB | Facility: MEDICAL CENTER | Age: 80
End: 2020-07-15
Attending: FAMILY MEDICINE
Payer: MEDICARE

## 2020-07-15 DIAGNOSIS — D69.6 THROMBOCYTOPENIA (HCC): ICD-10-CM

## 2020-07-15 DIAGNOSIS — E78.5 DYSLIPIDEMIA: ICD-10-CM

## 2020-07-15 LAB
ALBUMIN SERPL BCP-MCNC: 4.4 G/DL (ref 3.2–4.9)
ALBUMIN/GLOB SERPL: 1.9 G/DL
ALP SERPL-CCNC: 74 U/L (ref 30–99)
ALT SERPL-CCNC: 13 U/L (ref 2–50)
ANION GAP SERPL CALC-SCNC: 15 MMOL/L (ref 7–16)
AST SERPL-CCNC: 14 U/L (ref 12–45)
BASOPHILS # BLD AUTO: 0.4 % (ref 0–1.8)
BASOPHILS # BLD: 0.03 K/UL (ref 0–0.12)
BILIRUB SERPL-MCNC: 1.4 MG/DL (ref 0.1–1.5)
BUN SERPL-MCNC: 23 MG/DL (ref 8–22)
CALCIUM SERPL-MCNC: 8.9 MG/DL (ref 8.5–10.5)
CHLORIDE SERPL-SCNC: 103 MMOL/L (ref 96–112)
CHOLEST SERPL-MCNC: 134 MG/DL (ref 100–199)
CO2 SERPL-SCNC: 23 MMOL/L (ref 20–33)
CREAT SERPL-MCNC: 0.69 MG/DL (ref 0.5–1.4)
EOSINOPHIL # BLD AUTO: 0.02 K/UL (ref 0–0.51)
EOSINOPHIL NFR BLD: 0.3 % (ref 0–6.9)
ERYTHROCYTE [DISTWIDTH] IN BLOOD BY AUTOMATED COUNT: 45.3 FL (ref 35.9–50)
FASTING STATUS PATIENT QL REPORTED: NORMAL
GLOBULIN SER CALC-MCNC: 2.3 G/DL (ref 1.9–3.5)
GLUCOSE SERPL-MCNC: 91 MG/DL (ref 65–99)
HCT VFR BLD AUTO: 43.1 % (ref 37–47)
HDLC SERPL-MCNC: 61 MG/DL
HGB BLD-MCNC: 14.1 G/DL (ref 12–16)
IMM GRANULOCYTES # BLD AUTO: 0.03 K/UL (ref 0–0.11)
IMM GRANULOCYTES NFR BLD AUTO: 0.4 % (ref 0–0.9)
LDLC SERPL CALC-MCNC: 60 MG/DL
LYMPHOCYTES # BLD AUTO: 1.25 K/UL (ref 1–4.8)
LYMPHOCYTES NFR BLD: 16.9 % (ref 22–41)
MCH RBC QN AUTO: 29.7 PG (ref 27–33)
MCHC RBC AUTO-ENTMCNC: 32.7 G/DL (ref 33.6–35)
MCV RBC AUTO: 90.7 FL (ref 81.4–97.8)
MONOCYTES # BLD AUTO: 0.52 K/UL (ref 0–0.85)
MONOCYTES NFR BLD AUTO: 7 % (ref 0–13.4)
NEUTROPHILS # BLD AUTO: 5.53 K/UL (ref 2–7.15)
NEUTROPHILS NFR BLD: 75 % (ref 44–72)
NRBC # BLD AUTO: 0 K/UL
NRBC BLD-RTO: 0 /100 WBC
PLATELET # BLD AUTO: 153 K/UL (ref 164–446)
PMV BLD AUTO: 13.2 FL (ref 9–12.9)
POTASSIUM SERPL-SCNC: 3.8 MMOL/L (ref 3.6–5.5)
PROT SERPL-MCNC: 6.7 G/DL (ref 6–8.2)
RBC # BLD AUTO: 4.75 M/UL (ref 4.2–5.4)
SODIUM SERPL-SCNC: 141 MMOL/L (ref 135–145)
TRIGL SERPL-MCNC: 64 MG/DL (ref 0–149)
WBC # BLD AUTO: 7.4 K/UL (ref 4.8–10.8)

## 2020-07-15 PROCEDURE — 80053 COMPREHEN METABOLIC PANEL: CPT

## 2020-07-15 PROCEDURE — 36415 COLL VENOUS BLD VENIPUNCTURE: CPT

## 2020-07-15 PROCEDURE — 80061 LIPID PANEL: CPT

## 2020-07-15 PROCEDURE — 85025 COMPLETE CBC W/AUTO DIFF WBC: CPT

## 2020-08-17 DIAGNOSIS — M81.0 AGE RELATED OSTEOPOROSIS, UNSPECIFIED PATHOLOGICAL FRACTURE PRESENCE: ICD-10-CM

## 2020-08-18 RX ORDER — ALENDRONATE SODIUM 70 MG/1
TABLET ORAL
Qty: 12 TAB | Refills: 0 | Status: SHIPPED | OUTPATIENT
Start: 2020-08-18 | End: 2020-01-01

## 2020-08-18 RX ORDER — ATORVASTATIN CALCIUM 10 MG/1
TABLET, FILM COATED ORAL
Qty: 100 TAB | Refills: 0 | Status: SHIPPED | OUTPATIENT
Start: 2020-08-18 | End: 2020-01-01

## 2020-10-14 NOTE — TELEPHONE ENCOUNTER
Future Appointments       Provider Department Center    10/22/2020 10:40 AM Tonya Stack M.D.; [facility]  [facility] [place]        ANNUAL WELLNESS VISIT PRE-VISIT PLANNING WITHOUT OUTREACH    1.  Reviewed note from last office visit with PCP: YES    2.  If any orders were placed at last visit, do we have Results/Consult Notes?        •  Labs - Labs ordered, completed on 07/15/2020 and results are in chart.       •  Imaging - Imaging was not ordered at last office visit.       •  Referrals - No referrals were ordered at last office visit.    3.  Immunizations were updated in [place] using WebIZ?: Yes       •  WebIZ Recommendations: FLU, PNEUMOVAX (PPSV23), TDAP, SHINGRIX (Shingles) and CPOX        •  Is patient due for Tdap? YES. Patient was not notified of copay/out of pocket cost.       •  Is patient due for Shingrix? YES. Patient was not notified of copay/out of pocket cost.     4.  Patient is due for the following Health Maintenance Topics:   Health Maintenance Due   Topic Date Due   • IMM DTaP/Tdap/Td Vaccine (1 - Tdap) 09/03/1960   • IMM ZOSTER VACCINES (1 of 2) 09/03/1991   • COLON CANCER SCREENING ANNUAL FIT  02/15/2019   • IMM INFLUENZA (1) 09/01/2020   • BONE DENSITY  09/10/2020     5.  Reviewed/Updated the following with patient:       •   Preferred Pharmacy? YES       •   Preferred Lab? YES       •   Preferred Communication? YES       •   Allergies? YES       •   Medications? YES. Was Abstract Encounter opened and chart updated? YES       •   Social History? YES. Was Abstract Encounter opened and chart updated? YES       •   Family History (document living status of immediate family members and if + hx of  cancer, diabetes, hypertension, hyperlipidemia, heart attack, stroke) YES. Was Abstract Encounter opened and chart updated? YES    6.  Care Team Updated:       •   DME Company (gait device, O2, CPAP, etc.): NO       •   Other Specialists (eye doctor, derm, GYN, cardiology, endo,  etc): YES    7. Orders for overdue Health Maintenance topics pended in Pre-Charting? NO    8.  Patient has the following Care Path diagnoses on Problem List:  NONE    9.  Patient was advised: “This is a free wellness visit. The provider will screen for medical conditions to help you stay healthy. If you have other concerns to address you may be asked to discuss these at a separate visit or there may be an additional fee.”     10.  Patient was informed to arrive 15 min prior to their scheduled appointment and bring in their medication bottles.  10/14/2020- ALEC,

## 2020-12-16 PROBLEM — I49.9 CARDIAC ARRHYTHMIA: Status: ACTIVE | Noted: 2020-01-01

## 2020-12-16 PROBLEM — I10 ESSENTIAL HYPERTENSION: Status: ACTIVE | Noted: 2020-01-01

## 2020-12-16 PROBLEM — R06.02 SHORTNESS OF BREATH: Status: ACTIVE | Noted: 2020-01-01

## 2020-12-16 PROBLEM — F17.200 TOBACCO DEPENDENCE: Status: ACTIVE | Noted: 2020-01-01

## 2020-12-16 PROBLEM — I44.7 LBBB (LEFT BUNDLE BRANCH BLOCK): Status: ACTIVE | Noted: 2020-01-01

## 2020-12-16 PROBLEM — I47.19 ATRIAL TACHYCARDIA (HCC): Status: ACTIVE | Noted: 2020-01-01
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
## 2020-12-16 NOTE — DISCHARGE PLANNING
"Care Transition Team Assessment    Information Source  Orientation : Oriented x 4  Information Given By: Patient  Informant's Name: \"Vargas\"  Who is responsible for making decisions for patient? : Patient     Interdisciplinary Discharge Planning  Primary Care Physician: Sreekanth  Lives with - Patient's Self Care Capacity: Alone and Able to Care For Self  Support Systems: Children  Housing / Facility: Mobile Home  Do You Take your Prescribed Medications Regularly: Yes  Mobility Issues: No  Prior Services: None  Patient Prefers to be Discharged to:: Home  Assistance Needed: No    Discharge Preparedness  What is your plan after discharge?: Home with help  What are your discharge supports?: Child  Prior Functional Level: Ambulatory, Independent with Activities of Daily Living  Difficulity with ADLs: None  Difficulity with IADLs: None    Functional Assesment  Prior Functional Level: Ambulatory, Independent with Activities of Daily Living    Finances  Financial Barriers to Discharge: No  Prescription Coverage: Yes       Advance Directive  Advance Directive?: None    Domestic Abuse  Have you ever been the victim of abuse or violence?: No       Anticipated Discharge Information  Discharge Address: [address] [place] [address] [address]  Discharge Contact Phone Number: [phone]        "

## 2020-12-16 NOTE — ED NOTES
Pharmacy Medication Reconciliation      Medication reconciliation updated and complete per pt at bedside  Allergies have been verified and updated   No oral ABX within the last 14 days  Patient home pharmacy:[facility]

## 2020-12-16 NOTE — ED PROVIDER NOTES
"ED Provider Note    CHIEF COMPLAINT  Chief Complaint   Patient presents with   • Dizziness       HPI  Mayra Veloz is a 80 y.o. female here for evaluation of lightheadedness and dizziness.  Patient states that she had a near syncopal event on Friday, which brought her to the ground.  She states that she did not hit her head.  She states that she was just \"very dizzy.\"  She does have history of vertigo, but states that this is persistent despite taking her medications.  She has no anticoagulant use, no vomiting, no fever chills.  States that she does not take anything for blood pressure, and normally has \"low blood pressure.\"  Patient has no chest pain, no shortness of breath, no back pain or headache.  Patient has no leg pain.      ROS  See HPI for further details, o/w negative.     PAST MEDICAL HISTORY   has a past medical history of Dyslipidemia (2/15/2013), Osteoarthritis (2/6/2013), Osteoporosis, and Thrombocytopenia (HCC) (2/15/2013).    SOCIAL HISTORY  Social History     Tobacco Use   • Smoking status: Current Every Day Smoker     Packs/day: 1.50     Years: 59.00     Pack years: 88.50     Types: Cigarettes   • Smokeless tobacco: Never Used   Substance and Sexual Activity   • Alcohol use: No     Alcohol/week: 0.0 oz     Frequency: Never     Binge frequency: Never   • Drug use: No   • Sexual activity: Never     Comment:  - 58 years       Family History  No bleeding disorders noted    SURGICAL HISTORY   has a past surgical history that includes orif, humerus and tubal coagulation laparoscopic bilateral.    CURRENT MEDICATIONS  Home Medications    **Home medications have not yet been reviewed for this encounter**         ALLERGIES  Allergies   Allergen Reactions   • Sulfa Drugs Unspecified     sensitivity         REVIEW OF SYSTEMS  See HPI for further details. Review of systems as above, otherwise all other systems are negative.     PHYSICAL EXAM  Constitutional:  cachectic mild acute " distress.  HEENT: Normocephalic, atraumatic. Posterior pharynx clear and moist.  Eyes:  EOMI. Normal sclera.  Neck: Supple, Full range of motion, nontender.  Chest/Pulmonary: clear to ausculation. Symmetrical expansion.   Cardio: Regular rate and rhythm with no murmur.   Abdomen: Soft, nontender. No peritoneal signs. No guarding. No palpable masses.  Musculoskeletal: No deformity, no edema, neurovascular intact.   Neuro: Clear speech, appropriate, cooperative, cranial nerves II-XII grossly intact.  Psych: Normal mood and affect      EC-ECHOCARDIOGRAM COMPLETE W/O CONT         CT-HEAD W/O   Final Result      1.  Chronic microvascular ischemic type changes.   2.  No acute intracranial abnormality.      DX-CHEST-PORTABLE (1 VIEW)   Final Result      No acute cardiopulmonary abnormality.        Results for orders placed or performed during the hospital encounter of 12/16/20   CBC w/ Differential   Result Value Ref Range    WBC 6.9 4.8 - 10.8 K/uL    RBC 5.67 (H) 4.20 - 5.40 M/uL    Hemoglobin 16.8 (H) 12.0 - 16.0 g/dL    Hematocrit 50.9 (H) 37.0 - 47.0 %    MCV 89.8 81.4 - 97.8 fL    MCH 29.6 27.0 - 33.0 pg    MCHC 33.0 (L) 33.6 - 35.0 g/dL    RDW 45.8 35.9 - 50.0 fL    Platelet Count 154 (L) 164 - 446 K/uL    MPV 12.5 9.0 - 12.9 fL    Neutrophils-Polys 74.90 (H) 44.00 - 72.00 %    Lymphocytes 19.00 (L) 22.00 - 41.00 %    Monocytes 5.10 0.00 - 13.40 %    Eosinophils 0.30 0.00 - 6.90 %    Basophils 0.60 0.00 - 1.80 %    Immature Granulocytes 0.10 0.00 - 0.90 %    Nucleated RBC 0.00 /100 WBC    Neutrophils (Absolute) 5.17 2.00 - 7.15 K/uL    Lymphs (Absolute) 1.31 1.00 - 4.80 K/uL    Monos (Absolute) 0.35 0.00 - 0.85 K/uL    Eos (Absolute) 0.02 0.00 - 0.51 K/uL    Baso (Absolute) 0.04 0.00 - 0.12 K/uL    Immature Granulocytes (abs) 0.01 0.00 - 0.11 K/uL    NRBC (Absolute) 0.00 K/uL   Complete Metabolic Panel (CMP)   Result Value Ref Range    Sodium 137 135 - 145 mmol/L    Potassium 4.0 3.6 - 5.5 mmol/L    Chloride 100 96  - 112 mmol/L    Co2 25 20 - 33 mmol/L    Anion Gap 12.0 7.0 - 16.0    Glucose 97 65 - 99 mg/dL    Bun 17 8 - 22 mg/dL    Creatinine 0.71 0.50 - 1.40 mg/dL    Calcium 9.8 8.5 - 10.5 mg/dL    AST(SGOT) 15 12 - 45 U/L    ALT(SGPT) 12 2 - 50 U/L    Alkaline Phosphatase 98 30 - 99 U/L    Total Bilirubin 1.7 (H) 0.1 - 1.5 mg/dL    Albumin 5.1 (H) 3.2 - 4.9 g/dL    Total Protein 8.2 6.0 - 8.2 g/dL    Globulin 3.1 1.9 - 3.5 g/dL    A-G Ratio 1.6 g/dL   Troponin STAT   Result Value Ref Range    Troponin T 13 6 - 19 ng/L   COVID/SARS CoV-2 PCR    Specimen: Nasopharyngeal; Respirate   Result Value Ref Range    COVID Order Status Received    SARS-CoV-2, PCR (In-House)   Result Value Ref Range    SARS-CoV-2 Source NP Swab    ESTIMATED GFR   Result Value Ref Range    GFR If African American >60 >60 mL/min/1.73 m 2    GFR If Non African American >60 >60 mL/min/1.73 m 2   URINALYSIS,CULTURE IF INDICATED    Specimen: Urine   Result Value Ref Range    Color Yellow     Character Clear     Specific Gravity 1.007 <1.035    Ph 7.0 5.0 - 8.0    Glucose Negative Negative mg/dL    Ketones Negative Negative mg/dL    Protein Negative Negative mg/dL    Bilirubin Negative Negative    Urobilinogen, Urine 0.2 Negative    Nitrite Negative Negative    Leukocyte Esterase Trace (A) Negative    Occult Blood Negative Negative    Micro Urine Req Microscopic    URINE MICROSCOPIC (W/UA)   Result Value Ref Range    WBC 2-5 /hpf    RBC 0-2 /hpf    Bacteria Negative None /hpf    Epithelial Cells Negative /hpf    Hyaline Cast 0-2 /lpf   POC UA   Result Value Ref Range    POC Color Yellow     POC Appearance Clear     POC Glucose Negative Negative mg/dL    POC Ketones Negative Negative mg/dL    POC Specific Gravity 1.015 1.005 - 1.030    POC Blood Trace-intact (A) Negative    POC Urine PH 7.0 5.0 - 8.0    POC Protein Negative Negative mg/dL    POC Nitrites Negative Negative    POC Leukocyte Esterase Negative Negative   EKG (NOW)   Result Value Ref Range     Report       [facility] Emergency Dept.    Test Date:  2020  Pt Name:    TJ LEIVA               Department: ER  MRN:        [id number]                      Room:  Gender:     Female                       Technician: [id number]  :        [id number]                   Requested By:ER TRIAGE PROTOCOL  Order #:    [id number]                    Reading MD:    Measurements  Intervals                                Axis  Rate:       97                           P:  MT:                                      QRS:        -54  QRSD:       130                          T:          100  QT:         404  QTc:        513    Interpretive Statements  ATRIAL FIBRILLATION  LEFT BUNDLE BRANCH BLOCK  Compared to ECG 2013 10:17:42  Left bundle-branch block now present  Sinus rhythm no longer present       Ekg;  Irregularly irregular at 97. LBBB noted.  qtc 513.  Compared to ekg from 3/11/13.     PROCEDURES     MEDICAL RECORD  I have reviewed patient's medical record and pertinent results are listed.    COURSE & MEDICAL DECISION MAKING  I have reviewed any medical record information, laboratory studies and radiographic results as noted above.    3:14 PM  Pts pb now 166/100 after the lopressor.  She states she does feel 'slightly better.'    4:00 pm  Dr. Carlson will see the pt.  Echo being done     5:41 PM  The pt will be admitted to the hospitalist    FINAL IMPRESSION  Dizziness  Hypertension   LBBB        Electronically signed by: Alhaji Suresh D.O., 2020 12:35 PM

## 2020-12-17 NOTE — CONSULTS
Cardiology Consult Note:    Jadiel Carlson M.D.  Date & Time note created:    12/16/2020   5:02 PM     Referring MD:  Dr. Suresh    Patient ID:   Name:             Mayra Veloz   YOB: 1940  Age:                 80 y.o.  female   MRN:               [id number]                                                             Reason for Consult:      dizzy    History of Present Illness:    80-year-old female with a longstanding history of tobacco abuse of 2 packs/day for approximately 60 years.  Last ECG she had in her system showed normal sinus rhythm.  She says start developing symptoms of lightheaded and dizziness.  Because of this an ECG was obtained that showed potential atrial fibrillation with controlled ventricular response and a left bundle branch block.  And bedside echo appeared abnormal.  I called for a formal echo which showed an EF of approximately 35 to 40% with wall motion abnormalities.  She denies prior cardiac history.  She does take medications for hypercholesterolemia.  She denies chest pain.  She does have baseline shortness of breath.    Review of Systems:      Constitutional: Denies fevers, Denies weight changes  Eyes: Denies changes in vision, no eye pain  Ears/Nose/Throat/Mouth: Denies nasal congestion or sore throat   Cardiovascular: no chest pain, no palpitations   Respiratory: + shortness of breath , Denies cough  Gastrointestinal/Hepatic: Denies abdominal pain, nausea, vomiting, diarrhea, constipation or GI bleeding   Genitourinary: Denies dysuria or frequency  Musculoskeletal/Rheum: Denies  joint pain and swelling, no edema  Skin: Denies rash  Neurological: Denies headache, confusion, memory loss or focal weakness/parasthesias  Psychiatric: denies mood disorder   Endocrine: Leni thyroid problems  Heme/Oncology/Lymph Nodes: Denies enlarged lymph nodes, denies brusing or known bleeding disorder  All other systems were reviewed and are negative (AMA/CMS criteria)                 Past Medical History:   Past Medical History:   Diagnosis Date   • Dyslipidemia 2/15/2013   • Osteoarthritis 2/6/2013   • Osteoporosis    • Thrombocytopenia (HCC) 2/15/2013     There are no active hospital problems to display for this patient.      Past Surgical History:  Past Surgical History:   Procedure Laterality Date   • ORIF, HUMERUS      age 9   • TUBAL COAGULATION LAPAROSCOPIC BILATERAL         Hospital Medications:  No current facility-administered medications for this encounter.      Current Outpatient Medications   Medication   • Iron-Vitamins (GERITOL) Liquid   • ibuprofen (MOTRIN) 200 MG Tab   • alendronate (FOSAMAX) 70 MG Tab   • atorvastatin (LIPITOR) 10 MG Tab   • meloxicam (MOBIC) 7.5 MG Tab   • CALCIUM PO   • Cholecalciferol (VITAMIN D3) 3000 UNITS Tab   • meclizine (ANTIVERT) 25 MG TABS         Current Outpatient Medications:  (Not in a hospital admission)      Medication Allergy:  Allergies   Allergen Reactions   • Sulfa Drugs Unspecified     Unknown reaction         Family History:  Family History   Problem Relation Age of Onset   • Heart Disease Mother         MI   • Hypertension Mother    • Cancer Father         colon   • Lung Disease Father         COPD   • Heart Disease Sister         pacer placed   • Stroke Brother 50   • Heart Disease Brother 60        pace maker   • Other Brother         Vertigo   • Other Son         Back issues work related   • No Known Problems Son    • No Known Problems Son    • Lung Disease Daughter         COPD   • Other Grandson         Kidney and liver failure   • Alcohol abuse Grandson        Social History:  Social History     Socioeconomic History   • Marital status:      Spouse name: Not on file   • Number of children: Not on file   • Years of education: Not on file   • Highest education level: Not on file   Occupational History   • Not on file   Social Needs   • Financial resource strain: Not on file   • Food insecurity     Worry: Never  "true     Inability: Never true   • Transportation needs     Medical: No     Non-medical: No   Tobacco Use   • Smoking status: Current Every Day Smoker     Packs/day: 1.50     Years: 59.00     Pack years: 88.50     Types: Cigarettes   • Smokeless tobacco: Never Used   Substance and Sexual Activity   • Alcohol use: No     Alcohol/week: 0.0 oz     Frequency: Never     Binge frequency: Never   • Drug use: No   • Sexual activity: Never     Comment:  - 57 years   Lifestyle   • Physical activity     Days per week: Not on file     Minutes per session: Not on file   • Stress: Not on file   Relationships   • Social connections     Talks on phone: Not on file     Gets together: Not on file     Attends Yazdanism service: Not on file     Active member of club or organization: Not on file     Attends meetings of clubs or organizations: Not on file     Relationship status: Not on file   • Intimate partner violence     Fear of current or ex partner: Not on file     Emotionally abused: Not on file     Physically abused: Not on file     Forced sexual activity: Not on file   Other Topics Concern   • Not on file   Social History Narrative   • Not on file         Physical Exam:  Vitals/ General Appearance:   Weight/BMI: Body mass index is 17.39 kg/m².  BP (!) 187/92   Pulse 78   Temp 36.8 °C (98.2 °F) (Temporal)   Resp (!) 22   Ht 1.702 m (5' 7\")   Wt 50.3 kg (111 lb)   SpO2 97%   Vitals:    12/16/20 1206 12/16/20 1440 12/16/20 1451 12/16/20 1622   BP:   (!) 161/100 (!) 187/92   Pulse:  (!) 108 81 78   Resp:   19 (!) 22   Temp:       TempSrc:       SpO2:   96% 97%   Weight: 50.3 kg (111 lb)      Height: 1.702 m (5' 7\")        Oxygen Therapy:  Pulse Oximetry: 97 %    Constitutional:   Well developed, Well nourished, No acute distress  HENMT:  Normocephalic, Atraumatic, Oropharynx moist mucous membranes, No oral exudates, Nose normal.  No thyromegaly.  Eyes:  EOMI, Conjunctiva normal, No discharge.  Neck:  Normal range of " motion, No cervical tenderness,  no JVD.  Cardiovascular:  Normal heart rate, Normal rhythm, No murmurs, No rubs, No gallops.   Extremitites with intact distal pulses, no cyanosis, or edema.  Lungs:  Normal breath sounds, breath sounds clear to auscultation bilaterally,  no crackles, no wheezing.   Abdomen: Bowel sounds normal, Soft, No tenderness, No guarding, No rebound, No masses, No hepatosplenomegaly.  Skin: Warm, Dry, No erythema, No rash, no induration.  Neurologic: Alert & oriented x 3, No focal deficits noted, cranial nerves II through X are grossly intact.  Psychiatric: Affect normal, Judgment normal, Mood normal.      MDM (Data Review):     Records reviewed and summarized in current documentation    Lab Data Review:  Recent Results (from the past 24 hour(s))   EKG (NOW)    Collection Time: 20 11:45 AM   Result Value Ref Range    Report       [facility] Emergency Dept.    Test Date:  2020  Pt Name:    TJ LEIVA               Department: ER  MRN:        [id number]                      Room:  Gender:     Female                       Technician: [id number]  :        1940                   Requested By:ER TRIAGE PROTOCOL  Order #:    [id number]                    Reading MD:    Measurements  Intervals                                Axis  Rate:       97                           P:  AL:                                      QRS:        -54  QRSD:       130                          T:          100  QT:         404  QTc:        513    Interpretive Statements  ATRIAL FIBRILLATION  LEFT BUNDLE BRANCH BLOCK  Compared to ECG 2013 10:17:42  Left bundle-branch block now present  Sinus rhythm no longer present     CBC w/ Differential    Collection Time: 20 12:38 PM   Result Value Ref Range    WBC 6.9 4.8 - 10.8 K/uL    RBC 5.67 (H) 4.20 - 5.40 M/uL    Hemoglobin 16.8 (H) 12.0 - 16.0 g/dL    Hematocrit 50.9 (H) 37.0 - 47.0 %    MCV 89.8 81.4 - 97.8 fL    MCH 29.6 27.0 -  33.0 pg    MCHC 33.0 (L) 33.6 - 35.0 g/dL    RDW 45.8 35.9 - 50.0 fL    Platelet Count 154 (L) 164 - 446 K/uL    MPV 12.5 9.0 - 12.9 fL    Neutrophils-Polys 74.90 (H) 44.00 - 72.00 %    Lymphocytes 19.00 (L) 22.00 - 41.00 %    Monocytes 5.10 0.00 - 13.40 %    Eosinophils 0.30 0.00 - 6.90 %    Basophils 0.60 0.00 - 1.80 %    Immature Granulocytes 0.10 0.00 - 0.90 %    Nucleated RBC 0.00 /100 WBC    Neutrophils (Absolute) 5.17 2.00 - 7.15 K/uL    Lymphs (Absolute) 1.31 1.00 - 4.80 K/uL    Monos (Absolute) 0.35 0.00 - 0.85 K/uL    Eos (Absolute) 0.02 0.00 - 0.51 K/uL    Baso (Absolute) 0.04 0.00 - 0.12 K/uL    Immature Granulocytes (abs) 0.01 0.00 - 0.11 K/uL    NRBC (Absolute) 0.00 K/uL   Complete Metabolic Panel (CMP)    Collection Time: 12/16/20 12:38 PM   Result Value Ref Range    Sodium 137 135 - 145 mmol/L    Potassium 4.0 3.6 - 5.5 mmol/L    Chloride 100 96 - 112 mmol/L    Co2 25 20 - 33 mmol/L    Anion Gap 12.0 7.0 - 16.0    Glucose 97 65 - 99 mg/dL    Bun 17 8 - 22 mg/dL    Creatinine 0.71 0.50 - 1.40 mg/dL    Calcium 9.8 8.5 - 10.5 mg/dL    AST(SGOT) 15 12 - 45 U/L    ALT(SGPT) 12 2 - 50 U/L    Alkaline Phosphatase 98 30 - 99 U/L    Total Bilirubin 1.7 (H) 0.1 - 1.5 mg/dL    Albumin 5.1 (H) 3.2 - 4.9 g/dL    Total Protein 8.2 6.0 - 8.2 g/dL    Globulin 3.1 1.9 - 3.5 g/dL    A-G Ratio 1.6 g/dL   Troponin STAT    Collection Time: 12/16/20 12:38 PM   Result Value Ref Range    Troponin T 13 6 - 19 ng/L   ESTIMATED GFR    Collection Time: 12/16/20 12:38 PM   Result Value Ref Range    GFR If African American >60 >60 mL/min/1.73 m 2    GFR If Non African American >60 >60 mL/min/1.73 m 2   COVID/SARS CoV-2 PCR    Collection Time: 12/16/20 12:45 PM    Specimen: Nasopharyngeal; Respirate   Result Value Ref Range    COVID Order Status Received    SARS-CoV-2, PCR (In-House)    Collection Time: 12/16/20 12:45 PM   Result Value Ref Range    SARS-CoV-2 Source NP Swab    POC UA    Collection Time: 12/16/20  3:16 PM   Result  Value Ref Range    POC Color Yellow     POC Appearance Clear     POC Glucose Negative Negative mg/dL    POC Ketones Negative Negative mg/dL    POC Specific Gravity 1.015 1.005 - 1.030    POC Blood Trace-intact (A) Negative    POC Urine PH 7.0 5.0 - 8.0    POC Protein Negative Negative mg/dL    POC Nitrites Negative Negative    POC Leukocyte Esterase Negative Negative   URINALYSIS,CULTURE IF INDICATED    Collection Time: 12/16/20  4:03 PM    Specimen: Urine   Result Value Ref Range    Color Yellow     Character Clear     Specific Gravity 1.007 <1.035    Ph 7.0 5.0 - 8.0    Glucose Negative Negative mg/dL    Ketones Negative Negative mg/dL    Protein Negative Negative mg/dL    Bilirubin Negative Negative    Urobilinogen, Urine 0.2 Negative    Nitrite Negative Negative    Leukocyte Esterase Trace (A) Negative    Occult Blood Negative Negative    Micro Urine Req Microscopic    URINE MICROSCOPIC (W/UA)    Collection Time: 12/16/20  4:03 PM   Result Value Ref Range    WBC 2-5 /hpf    RBC 0-2 /hpf    Bacteria Negative None /hpf    Epithelial Cells Negative /hpf    Hyaline Cast 0-2 /lpf       Imaging/Procedures Review:    Chest Xray:  Reviewed    EKG:   Dated 12/16/2020 personally viewed interpreted myself showing normal sinus rhythm with PACs with a left bundle branch block.    ECHO:  Per hpi    MDM (Assessment and Plan):     There are no active hospital problems to display for this patient.    80-year-old female with an abnormal ECG which does not appear more to be consistent with normal sinus rhythm with PACs or a wandering atrial pacemaker with a borderline abnormal echo.  I think at this point she needs medical tune up including a work-up for ischemia.  Please schedule her for nuclear stress test in the morning.  At this point is not entirely clear that she does have an ischemic issue but I would like a stress test for this.  I also would not call her heart failure at this point since she is limited by her COPD has no  lower extremity edema and essentially asymptomatic.  Therefore she has asymptomatic LV dysfunction.  We will continue to follow her.

## 2020-12-17 NOTE — PROGRESS NOTES
[facility]     Attending: Dr Muhammad    Senior Resident: Dr. Meera Conn, PGY 2      PATIENT: Mayra Veloz; [id number]; 1940 Hospital Day: 2    ID: 80 y.o. female PMH tobacco abuse, HLD, OA admitted 12/16/20 following episode of presyncope 12/11/20, followed by worsening vertigo with presentation to ED 12/15/20, admitted for cardiac monitoring and workup in context of new LBBB, echocardiogram with EF 35-40%, and potential atrial fibrillation w/ controlled ventricular response. Also with HTN to 209/103 in ED without known hx HTN in past.    SUBJECTIVE: No acute events overnight. Reports chronic, mild dizziness 2/2 known BPPV, partially controlled with meclizine. Denies AMS, changes in vision, headache, other sxs while hypertensive yesterday. Also denies pain or anxiety at the time but does report that she was very dizzy in ER.     Does report 4x diarrhea last night. States they gave her 2x stool softeners in ED even though she had had a BM yesterday AM. Denies diarrhea previously, also abdominal pain, N/V, loss of taste or smell, headache, fever, myalgias, cough, dyspnea.     Lives in [place] alone. Takes care of ADLs independently, except for driving at this time per chronic vertigo. Her son lives nearby and helps with that. He is reliable and available.     OBJECTIVE:     Vitals:    12/16/20 2153 12/16/20 2345 12/17/20 0402 12/17/20 0450   BP: 160/88 133/84 134/76 (!) 95/58   Pulse: 65 75 61    Resp: 19 14 17    Temp: 36.3 °C (97.4 °F) 36.3 °C (97.4 °F) 36.1 °C (97 °F)    TempSrc: Temporal Temporal Temporal    SpO2: 97% 92% 91%    Weight: 45.5 kg (100 lb 5 oz)      Height:         No intake or output data in the 24 hours ending 12/17/20 0635    PE:  General: No acute distress, resting comfortably in bed. Very thin. Alert.   HEENT: NC/AT. PERRLA. EOMI. MMM  Cardiovascular: Very distant heart sounds. RRR with no M/R/G appreciated.  Respiratory: Symmetrical chest. CTAB with no  W/R/R  Abdomen: soft, NT/ND, no masses, +BS   EXT:  [place], 5/5 strength, No C/C/E 2+ pulses   Neuro: non focal with no numbness, tingling or changes in sensation    LABS:  Recent Labs     12/16/20  1238   WBC 6.9   RBC 5.67*   HEMOGLOBIN 16.8*   HEMATOCRIT 50.9*   MCV 89.8   MCH 29.6   RDW 45.8   PLATELETCT 154*   MPV 12.5   NEUTSPOLYS 74.90*   LYMPHOCYTES 19.00*   MONOCYTES 5.10   EOSINOPHILS 0.30   BASOPHILS 0.60     Recent Labs     12/16/20  1238   SODIUM 137   POTASSIUM 4.0   CHLORIDE 100   CO2 25   BUN 17   CREATININE 0.71   CALCIUM 9.8   ALBUMIN 5.1*     Estimated GFR/CRCL = Estimated Creatinine Clearance: 46.1 mL/min (by C-G formula based on SCr of 0.71 mg/dL).  Recent Labs     12/16/20  1238 12/17/20  0445   GLUCOSE 97  --    POCGLUCOSE  --  88     Recent Labs     12/16/20  1238   ASTSGOT 15   ALTSGPT 12   TBILIRUBIN 1.7*   ALKPHOSPHAT 98   GLOBULIN 3.1             No results for input(s): INR, APTT, FIBRINOGEN in the last 72 hours.    Invalid input(s): DIMER      IMAGING:   EC-ECHOCARDIOGRAM COMPLETE W/O CONT   Final Result      CT-HEAD W/O   Final Result      1.  Chronic microvascular ischemic type changes.   2.  No acute intracranial abnormality.      DX-CHEST-PORTABLE (1 VIEW)   Final Result      No acute cardiopulmonary abnormality.      NM-CARDIAC STRESS TEST    (Results Pending)         MEDS:  Current Facility-Administered Medications   Medication Last Admin   • [START ON 12/18/2020] omeprazole (PRILOSEC) capsule 20 mg     • lisinopril (PRINIVIL) tablet 20 mg 20 mg at 12/16/20 1853   • atorvastatin (LIPITOR) tablet 10 mg 10 mg at 12/16/20 1843   • meclizine (ANTIVERT) tablet 25 mg     • senna-docusate (PERICOLACE or SENOKOT S) 8.6-50 MG per tablet 2 Tab 2 Tab at 12/16/20 1843    And   • polyethylene glycol/lytes (MIRALAX) PACKET 1 Packet      And   • magnesium hydroxide (MILK OF MAGNESIA) suspension 30 mL      And   • bisacodyl (DULCOLAX) suppository 10 mg     • enoxaparin (LOVENOX) inj 40 mg 40 mg at  12/17/20 0532   • ondansetron (ZOFRAN) syringe/vial injection 4 mg     • ondansetron (ZOFRAN ODT) dispertab 4 mg 4 mg at 12/17/20 0440   • labetalol (NORMODYNE/TRANDATE) injection 10 mg     • nicotine (NICODERM) 21 MG/24HR 21 mg      And   • nicotine polacrilex (NICORETTE) 2 MG piece 2 mg     • aspirin EC (ECOTRIN) tablet 325 mg         PROBLEM LIST:  No problems updated.    ASSESSMENT/PLAN: 80 y.o. female PMH tobacco abuse, HLD, OA admitted 12/16/20 following episode of presyncope 12/11/20 with subsequent worsening of chronic vertigo, admitted for cardiac monitoring and workup in context of new LBBB, echocardiogram with EF 35-40%, and possible atrial fibrillation w/ controlled ventricular response. Also with HTN to 209/103 in ED without known hx HTN in past.      #Presyncope  #LBBB, new  #Reduced left ventricular ejection fraction  #BPPV  Patient with previous history of BPPV but recent new-onset presyncope. Cardiology consulted from the emergency department and will continue to follow.  -Nuclear stress test planned for this morning   N.p.o. 4 hours prior   Follow up results  -Cardiology following; appreciate and follow-up recommendations  -Orthostatic blood pressures  -Fall precautions  -Physical therapy, Occupational Therapy consults    #Hypertension, new onset  #Hypertensive urgency  No previous stated history of hypertension per patient.  Chart review of primary care notes also does not reveal history of hypertension.Patient given 2 doses of Lopressor in the emergency department yesterday with resolution of hypertensive urgency.  Subsequently started on lisinopril with blood pressure now hypotensive range.  Unclear cause of hypertensive urgency but suspect emotional stress 2/2 worsening vertigo and ER environment.  -Discontinue lisinopril now  -Will require future monitoring on outpatient basis    #Diarrhea  Pt states she was given stool softeners despite having regular BMs. Denies diarrhea prior to this  medication. Low suspicion for infectious cause at this point.  -CTM    #Current everyday smoker  #Tobacco abuse  #Hyperlipidemia  Patient with over 60 years of daily smoking habit.  In addition to hyperlipidemia, this contributes significantly to cardiovascular risk factors.  -Continue to  as appropriate; pt has no interest in cessation at this point  -Continue home atorvastatin 10 mg daily  -Hyperlipidemia to be followed in the outpatient basis    #Polycythemia, mild  #Thrombocytopenia, chronic    #Osteoporosis, present on admission  Observation admission.  -To resume home alendronate, calcium, vitamin D upon discharge.       Dispo: obs; possible d/c to home today, pending nuc med study and cardiology recs

## 2020-12-17 NOTE — THERAPY
"Physical Therapy   Initial Evaluation     Patient Name: Mayra Veloz  Age:  80 y.o., Sex:  female  Medical Record #: [id number]  Today's Date: 12/17/2020     Precautions: Fall Risk    Assessment  Patient is 80 y.o. female admitted on 12/16/20 following syncopal episode at home resulting in GLF. PMH: osteoarthritis, dyslipidemia, tobacco dependence. Orthostats taken during tx and reported to ns. BP supine: 104/56mmHg, sitting 106/62mmHg, standing 100/65mmHg without c/o symptoms.  Pt refused ambulation as she hasn't eaten and would like to. PT will see pt once more prior to discharge to determine if there are any DME or discharge needs. Anticipate home with adult son assist as needed when medically cleared.    Plan    Recommend Physical Therapy 2 times per week for 2 visits for the following treatments:  Equipment, Gait Training, Neuro Re-Education / Balance, Stair Training, Therapeutic Activities and Therapeutic Exercises    DC Equipment Recommendations: Unable to determine at this time  Discharge Recommendations: Anticipate that the patient will have no further physical therapy needs after discharge from the hospital       Subjective    Pt agrees to PT. \"I'm so hungry. I didn't get lunch.\"     Objective       12/17/20 1211   Prior Living Situation   Prior Services None   Housing / Facility Mobile Home   Steps Into Home 4   Steps In Home 0   Rail Right Rail (Steps into Home)   Equipment Owned Single Point Cane   Lives with - Patient's Self Care Capacity Alone and Able to Care For Self   Comments Pt reports adult son lives 2 miles away and can assist at DC PRN   Prior Level of Functional Mobility   Bed Mobility Independent   Transfer Status Independent   Ambulation Independent   Distance Ambulation (Feet) 300   Assistive Devices Used None   Stairs Independent   Comments Pt reports she drives.   History of Falls   History of Falls Yes   Date of Last Fall 12/16/20  (due to syncope)   Cognition    Cognition / " Consciousness WDL   Comments Pt cooperative and appropriate t/o tx.   Passive ROM Lower Body   Passive ROM Lower Body WDL   Active ROM Lower Body    Active ROM Lower Body  WDL   Strength Lower Body   Lower Body Strength  WDL   Sensation Lower Body   Lower Extremity Sensation   WDL   Lower Body Muscle Tone   Lower Body Muscle Tone  WDL   Coordination Lower Body    Coordination Lower Body  WDL   Balance Assessment   Sitting Balance (Static) Fair +   Sitting Balance (Dynamic) Fair   Standing Balance (Static) Fair +   Standing Balance (Dynamic) Fair   Weight Shift Sitting Fair   Weight Shift Standing Fair   Comments No AD   Gait Analysis   Gait Level Of Assist Unable to Participate   Bed Mobility    Supine to Sit Supervised   Scooting Supervised   Functional Mobility   Sit to Stand Supervised   Bed, Chair, Wheelchair Transfer Supervised   Transfer Method Stand Step   Mobility supine>sit at EOB>stand>chair   Comments orthostats taken   Activity Tolerance   Sitting Edge of Bed 3   Standing 5   Patient / Family Goals    Patient / Family Goal #1 Home soon   Short Term Goals    Short Term Goal # 1 Pt will be SPV for gait>250' without AD in 6 txs to improve functional mobility   Short Term Goal # 2 Pt will be SPV for up/down 4 steps with rail to be able to safely access her home.   Education Group   Role of Physical Therapist Patient Response Patient;Acceptance;Explanation;Verbal Demonstration;Action Demonstration   Problem List    Problems Impaired Ambulation;Decreased Activity Tolerance   Anticipated Discharge Equipment and Recommendations   DC Equipment Recommendations Unable to determine at this time   Discharge Recommendations Anticipate that the patient will have no further physical therapy needs after discharge from the hospital

## 2020-12-17 NOTE — PROGRESS NOTES
Report received from ED RN. Patient A&O 4, on room air, and resting comfortably in bed. Pt has no complaints at this time. Patient educated on importance of calling, bed controls on, bed locked and in lowest position, call light within reach. Appropriate fall precautions in place.

## 2020-12-17 NOTE — ASSESSMENT & PLAN NOTE
Appears new with reduced ef on echo and dizziness    Check serial troponins    Asa    Statin    Stress test

## 2020-12-17 NOTE — PROGRESS NOTES
Cardiology Follow Up Progress Note    Date of Service  12/17/2020    Attending Physician  No att. providers found    Chief Complaint   Dizziness    HPI  Vargas Veloz is a 80 y.o. female admitted 12/16/2020 with tobacco abuse of 2 packs/day for approximately 60 years.  Last ECG she had in the system showed normal sinus rhythm.  She says start developing symptoms of lightheaded and dizziness.  Because of this an ECG was obtained that showed potential atrial fibrillation with controlled ventricular response and a left bundle branch block.  And bedside echo appeared abnormal. A formal echo was obtained and indicated an EF of approximately 35 to 40% with wall motion abnormalities.  She denies prior cardiac history.  She does take medications for hypercholesterolemia.  She denies chest pain.  She does have baseline shortness of breath.    Interim Events  12/17/2020: SR 60-90s  MPI today  Pt denies CP, palpitations, lower extremity edema  VSS  , TG 64, HDL 61, LDL 60    Review of Systems  Review of Systems   Constitutional: Negative for chills and fever.   Respiratory: Negative for cough, chest tightness, shortness of breath and wheezing.    Cardiovascular: Negative for chest pain, palpitations and leg swelling.   Gastrointestinal: Negative for nausea and vomiting.   Neurological: Positive for dizziness. Negative for light-headedness.   All other systems reviewed and are negative.      Vital signs in last 24 hours  Temp:  [36.1 °C (97 °F)-36.8 °C (98.2 °F)] 36.7 °C (98.1 °F)  Pulse:  [] 71  Resp:  [14-34] 16  BP: ()/() 105/63  SpO2:  [91 %-97 %] 92 %    Physical Exam  Physical Exam  Vitals signs and nursing note reviewed.   Constitutional:       Appearance: Normal appearance. She is cachectic.   HENT:      Head: Normocephalic and atraumatic.      Mouth/Throat:      Mouth: Mucous membranes are moist.   Eyes:      Extraocular Movements: Extraocular movements intact.   Neck:      Musculoskeletal: Normal  range of motion and neck supple.      Comments: No JVD  Cardiovascular:      Rate and Rhythm: Normal rate and regular rhythm.      Pulses: Normal pulses.      Heart sounds: Normal heart sounds. No murmur.      Comments: No edema  Pulmonary:      Effort: Pulmonary effort is normal.      Breath sounds: Examination of the right-lower field reveals decreased breath sounds. Examination of the left-lower field reveals decreased breath sounds. Decreased breath sounds present.   Abdominal:      Palpations: Abdomen is soft.   Skin:     General: Skin is warm and dry.   Neurological:      General: No focal deficit present.      Mental Status: She is alert and oriented to person, place, and time.   Psychiatric:         Mood and Affect: Mood normal.         Behavior: Behavior normal.         Lab Review  Lab Results   Component Value Date/Time    WBC 6.9 12/16/2020 12:38 PM    RBC 5.67 (H) 12/16/2020 12:38 PM    HEMOGLOBIN 16.8 (H) 12/16/2020 12:38 PM    HEMATOCRIT 50.9 (H) 12/16/2020 12:38 PM    MCV 89.8 12/16/2020 12:38 PM    MCH 29.6 12/16/2020 12:38 PM    MCHC 33.0 (L) 12/16/2020 12:38 PM    MPV 12.5 12/16/2020 12:38 PM      Lab Results   Component Value Date/Time    SODIUM 137 12/16/2020 12:38 PM    POTASSIUM 4.0 12/16/2020 12:38 PM    CHLORIDE 100 12/16/2020 12:38 PM    CO2 25 12/16/2020 12:38 PM    GLUCOSE 97 12/16/2020 12:38 PM    BUN 17 12/16/2020 12:38 PM    CREATININE 0.71 12/16/2020 12:38 PM      Lab Results   Component Value Date/Time    ASTSGOT 15 12/16/2020 12:38 PM    ALTSGPT 12 12/16/2020 12:38 PM     Lab Results   Component Value Date/Time    CHOLSTRLTOT 134 07/15/2020 10:34 AM    LDL 60 07/15/2020 10:34 AM    HDL 61 07/15/2020 10:34 AM    TRIGLYCERIDE 64 07/15/2020 10:34 AM    TROPONINT 25 (H) 12/17/2020 06:31 AM       No results for input(s): NTPROBNP in the last 72 hours.    Cardiac Imaging and Procedures Review  EKG:  My personal interpretation of the EKG dated 12/16/2020 is SR with PACs or wondering atrial  pacemaker    Echocardiogram: 12/16/2020  CONCLUSIONS  Severely reduced left ventricular systolic function.  Left ventricular ejection fraction is visually estimated to be 30%.  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  Otherwise global hypokinesis.  Grade I diastolic dysfunction.  Mild concentric left ventricular hypertrophy.  The right ventricle was normal in size and function.  No significant valvular regurgitation or stenosis.   Right atrial pressure is estimated to be 3 mmHg.  Estimated right ventricular systolic pressure is 39 mmHg.  No prior study is available for comparison.   Dr. Carlson aware of study findings at the time the echocardiogram was   performed.      Imaging  Chest X-Ray: 12/16/2020  No acute cardiopulmonary process    Stress Test: 12/17/2020   Myocardial Perfusion Report   NUCLEAR IMAGING INTERPRETATION   No evidence of significant jeopardized viable myocardium or prior myocardial    infarction.   LV EF is 47% with diffuse hypokinesis and dyskinesis in the inferoseptal    myocardium. Previous EF by ECHO is 30%. Discrepancy is likely artifactual.    Please refer to ECHO as a more accurate estimate.    Assessment/Plan  1. LBBB  -MPI today, no evidence of significant jeopardized by a focal myocardium or prior myocardial infarction with diffuse hypokinesis and dyskinesis of the inferoseptal myocardium  -Continue ASA, atorvastatin    2.  HTN  -Well controlled   -100/65    3.  Newly diagnosed heart failure, EF 30%, Stage 3 class C  BP is soft at this time but will need to start lisinopril, beta-blocker, spironolactone      Thank you for allowing me to participate in the care of this patient.      Please contact me with any questions.        MYLA Calzada  [facility]  [phone]    No future appointments.    Please note that this dictation was created using voice recognition software. I have worked with consultants from the vendor  as well as technical experts from [employer] to optimize the interface. I have made every reasonable attempt to correct obvious errors, but I expect that there are errors of grammar and possibly content I did not discover before finalizing the note.

## 2020-12-17 NOTE — PROGRESS NOTES
Patient presents to [facility] suite for pharmacological cardiac stress test with MPI. Nursing goals identified: knowledge deficit, potential for anxiety r/t stress test, potential for compromised cardiac output. Care plan includes patient education and reassurance, and access to ACLS cart/team. Allergies, history, labs and ECG reviewed. No caffeine consumption by patient and NPO confirmed. Resting images attained. Patient prepped for pharmacological cardiac stress study. Lexiscan infused over 12 seconds. Observed signs and reported symptoms include: SOB, abdominal distention. Caffeinated beverage provided. Symptoms resolved. Patient tolerated procedure well.

## 2020-12-17 NOTE — DIETARY
"Nutrition services: Day 0 of admit.  Mayra Veloz is a 80 y.o. female with admitting DX of dizziness and HTN.    Consult received for BMI<19.    Pt seen at bedside. Pt reports 1 week of eating <50% meals due to having minimal appetite which is still current. Pt reports usual weight of 111 lbs but unsure when last at this weight, current weight is 100 lbs. Pt reports used to drink Ensure shakes at home.    Assessment:  Height: 170.2 cm (5' 7\")  Weight: 45.5 kg (100 lb 5 oz) - stand up scale  Body mass index is 15.71 kg/m²., BMI classification: underweight  Diet/Intake: cardiac, no PO intake recorded    Evaluation:   1. PMH of osteoarthritis, dyslipidemia, and osteoporosis.  2. Pt reports eating <50% of meals for 1 week with decreased appetite. Pt previously at 110 lbs on 10/16/19, and current weight is 100 lbs which is 10 lbs (9%) weight loss in >1 year which is not severe.  3. Observed pt with severe muscle and severe fat loss of the temporal, clavical, scapula, and tricep regions.  4. MD notes bouts of diarrhea.  5. Labs: total bili 1.7  6. Meds: lipitor, colace, zofran prn, bowel protocol  7. Last BM: 12/16    Malnutrition Risk: Severe malnutrition in the context of starvation related as evidenced by severe muscle and severe fat loss.    Recommendations/Plan:  1. Cardiac diet as tolerated.  2. Encourage intake of meals.  3. Document intake of all meals as % taken in ADL's to provide interdisciplinary communication across all shifts.   4. Monitor weight.  5. Nutrition rep will continue to see patient for ongoing meal and snack preferences.     RD following.            "

## 2020-12-17 NOTE — CARE PLAN
Problem: Communication  Goal: The ability to communicate needs accurately and effectively will improve  Outcome: PROGRESSING AS EXPECTED  Note: Discussed possible precipitating factors with patient. She was forthright and engaged. Told me of past medical history of inner ear problems.      Problem: Safety  Goal: Will remain free from injury  Outcome: PROGRESSING AS EXPECTED  Goal: Will remain free from falls  Outcome: PROGRESSING AS EXPECTED  Note: Steady gait, worked with PT.

## 2020-12-17 NOTE — NON-PROVIDER
[facility] MEDICINE PROGRESS NOTE     Attending: Reginald     Resident: Virgen     PATIENT: Mayra Veloz; [id number]; 1940, T 836-1    ID: 80 y.o. female hospital day 1 admitted for dizziness that started Friday.  She has a PMH of dyslipidemia, osteoarthritis and tobacco use (100 pack year).  She takes meds for high cholesterol.  The dizziness caused her to have a syncopal event on Friday in which she did had no LOC or head trauma according to patient.  Her dizziness is associated with SOB which is present at baseline.  She denies any chest pain, fever, or urinary incontinence, or trauma from falls.  In the ED she was HTN with highest being 209/103.  In ED, head CT was normal, EKG showed possible atrial fibrillation and new LBBB.  Cardiology (Dr. Carlson) did not think it was a. Fib due to p-waves present.  ECHO showed EF of 35-40% with wall motion abnormalities.  Cardio recommends work up for ischemia including nuclear stress test.      SUBJECTIVE: No acute events overnight.  Her BP was 95/58 this morning.  She reports feeling weak and having 4 bouts of diarrhea.  She denies any pain.  She is drink water and is NPO for stress test later today.  She is ambulating to and from restroom.          OBJECTIVE:     Vitals:    12/16/20 2153 12/16/20 2345 12/17/20 0402 12/17/20 0450   BP: 160/88 133/84 134/76 (!) 95/58   Pulse: 65 75 61    Resp: 19 14 17    Temp: 36.3 °C (97.4 °F) 36.3 °C (97.4 °F) 36.1 °C (97 °F)    TempSrc: Temporal Temporal Temporal    SpO2: 97% 92% 91%    Weight: 45.5 kg (100 lb 5 oz)      Height:         No intake or output data in the 24 hours ending 12/17/20 0625    PE:   General: No acute distress, resting comfortably in bed, thin and frail appearing   HEENT: NC/AT. PERRLA. EOMI. MMM  Cardiovascular: heart sounds degreased, normal S1/S2, RRR, no m/r/g  Respiratory: Symmetric inspiratory effort. CTAB with no adventitious sounds  Abdomen: BS+, soft, NT/ND   EXT:  [place],4/5 strength, 2+ pulses, no  rashes, bruising, or bleeding.  Neuro: Non focal with no numbness, tingling or changes in sensation    LABS:  Recent Labs     12/16/20  1238   WBC 6.9   RBC 5.67*   HEMOGLOBIN 16.8*   HEMATOCRIT 50.9*   MCV 89.8   MCH 29.6   RDW 45.8   PLATELETCT 154*   MPV 12.5   NEUTSPOLYS 74.90*   LYMPHOCYTES 19.00*   MONOCYTES 5.10   EOSINOPHILS 0.30   BASOPHILS 0.60     Recent Labs     12/16/20  1238   SODIUM 137   POTASSIUM 4.0   CHLORIDE 100   CO2 25   BUN 17   CREATININE 0.71   CALCIUM 9.8   ALBUMIN 5.1*     Estimated GFR/CRCL = Estimated Creatinine Clearance: 46.1 mL/min (by C-G formula based on SCr of 0.71 mg/dL).  Recent Labs     12/16/20  1238 12/17/20  0445   GLUCOSE 97  --    POCGLUCOSE  --  88     Recent Labs     12/16/20  1238   ASTSGOT 15   ALTSGPT 12   TBILIRUBIN 1.7*   ALKPHOSPHAT 98   GLOBULIN 3.1             No results for input(s): INR, APTT, FIBRINOGEN in the last 72 hours.    Invalid input(s): DIMER    MICROBIOLOGY:     IMAGING:   EC-ECHOCARDIOGRAM COMPLETE W/O CONT   Final Result      CT-HEAD W/O   Final Result      1.  Chronic microvascular ischemic type changes.   2.  No acute intracranial abnormality.      DX-CHEST-PORTABLE (1 VIEW)   Final Result      No acute cardiopulmonary abnormality.      NM-CARDIAC STRESS TEST    (Results Pending)     Nuclear Stress Test 12/17/20:     No evidence of significant jeopardized viable myocardium or prior myocardial    infarction.   LV EF is 47% with diffuse hypokinesis and dyskinesis in the inferoseptal    myocardium. Previous EF by ECHO is 30%. Discrepancy is likely artifactual.    Please refer to ECHO as a more accurate estimate.    MEDS:  Current Facility-Administered Medications   Medication Last Admin   • [START ON 12/18/2020] omeprazole (PRILOSEC) capsule 20 mg     • lisinopril (PRINIVIL) tablet 20 mg 20 mg at 12/16/20 1853   • atorvastatin (LIPITOR) tablet 10 mg 10 mg at 12/16/20 1843   • meclizine (ANTIVERT) tablet 25 mg     • senna-docusate (PERICOLACE or  SENOKOT S) 8.6-50 MG per tablet 2 Tab 2 Tab at 12/16/20 1843    And   • polyethylene glycol/lytes (MIRALAX) PACKET 1 Packet      And   • magnesium hydroxide (MILK OF MAGNESIA) suspension 30 mL      And   • bisacodyl (DULCOLAX) suppository 10 mg     • enoxaparin (LOVENOX) inj 40 mg 40 mg at 12/17/20 0532   • ondansetron (ZOFRAN) syringe/vial injection 4 mg     • ondansetron (ZOFRAN ODT) dispertab 4 mg 4 mg at 12/17/20 0440   • labetalol (NORMODYNE/TRANDATE) injection 10 mg     • nicotine (NICODERM) 21 MG/24HR 21 mg      And   • nicotine polacrilex (NICORETTE) 2 MG piece 2 mg     • aspirin EC (ECOTRIN) tablet 325 mg         PROBLEM LIST:  No problems updated.    ASSESSMENT/PLAN: Mayra Veloz is a 80 y.o. female who presented for dizziness and a presyncope episode that occurred 6 days ago.  She has a PMH of dyslipidemia, osteoarthritis, BPPV and tobacco use.  The presyncope was likely due to orthostatic hypotension.      #presyncope   Patient was sitting and then arose to lock door when she began to feel muscle weakness in her legs, along with dizziness.  She did not lose consciousness or have any other symptoms including chest pain, N/V, stress, diaphoresis, or incontinence.  Patient takes meclizine for BPPV.  Event was not witnessed and has never occurred before.  Head CT negative.  Glucose range from .  Cardiac syncope ruled out with nuclear stress test showing no evidence of significant jeopardized viable myocardium or prior myocardial infarction.  PE not ruled out.      -CXR from 12/16/20 showed no acute cardiopulmonary abnormality  -EEG: per cardiology no atrial fibrillation but LBBB present   -troponin slightly elevated at 25 (6-19).  Trending up from 13, 15 and 25.  -ECHO from 12/16/20 showed EF of 30%  -nuclear stress test from 12/14/20 showed no evidence of significant jeopardized viable myocardium or prior myocardial infarction.  -orthostatic vital signs: BP supine: 104/56mmHg, sitting 106/62mmHg,  standing 100/65mmHg without c/o symptoms  -BNP 5057 on 12/17/20  -consider ordering D-dimer     #hypotension  -BP 95/58 but was 209/103 at admission in ED  -likely due to over use of antihypertensives   -continue to monitor     Core Measures:  Fluids: none  Lines: IV  Abx: none  Diet: advance as tolerated  PPX: none  DISPO: possible discharge once cardiology reviews nuclear stress test and clears for dx     CODE STATUS: FULL

## 2020-12-17 NOTE — ASSESSMENT & PLAN NOTE
Probably related to COPD    Rule out anginal equivalent with serial troponins and cardiac stress test    No overt signs of acute CHF despite reduced ejection fraction seen on echocardiogram

## 2020-12-17 NOTE — ASSESSMENT & PLAN NOTE
Rate control with calcium blocker or beta-blocker as needed    Tele    Follow Cardiology MD recommendations

## 2020-12-18 NOTE — PROGRESS NOTES
Cardiology Follow Up Progress Note    Date of Service  12/18/2020    Attending Physician  No att. providers found    Chief Complaint   Dizziness    HPI  Vargas Veloz is a 80 y.o. female admitted 12/16/2020 with tobacco abuse of 2 packs/day for approximately 60 years.  Last ECG she had in the system showed normal sinus rhythm.  She says start developing symptoms of lightheaded and dizziness.  Because of this an ECG was obtained that showed potential atrial fibrillation with controlled ventricular response and a left bundle branch block.  And bedside echo appeared abnormal. A formal echo was obtained and indicated an EF of approximately 35 to 40% with wall motion abnormalities.  She denies prior cardiac history.  She does take medications for hypercholesterolemia.  She denies chest pain.  She does have baseline shortness of breath.    Interim Events  12/18/2020: SR-ST, BBB   VSS, 107/61  Pt reports dizziness (chronic condition), denies chest pain, palpitations, shortness of breath, orthopnea, and PND.  No edema on physical exam  DC today      12/17/2020: SR 60-90s  MPI today  Pt denies CP, palpitations, lower extremity edema  VSS  , TG 64, HDL 61, LDL 60    Review of Systems  Review of Systems   Constitutional: Negative for chills and fever.   Respiratory: Negative for cough, chest tightness, shortness of breath and wheezing.    Cardiovascular: Negative for chest pain, palpitations and leg swelling.   Gastrointestinal: Negative for nausea and vomiting.   Neurological: Positive for dizziness. Negative for light-headedness.   All other systems reviewed and are negative.      Vital signs in last 24 hours  Temp:  [36.2 °C (97.1 °F)-37.1 °C (98.7 °F)] 37.1 °C (98.7 °F)  Pulse:  [65-88] 65  Resp:  [14-17] 17  BP: (100-117)/(61-68) 107/61  SpO2:  [92 %-97 %] 92 %    Physical Exam  Physical Exam  Vitals signs and nursing note reviewed.   Constitutional:       Appearance: Normal appearance.   HENT:      Head:  Normocephalic and atraumatic.      Mouth/Throat:      Mouth: Mucous membranes are moist.   Eyes:      Extraocular Movements: Extraocular movements intact.   Neck:      Musculoskeletal: Normal range of motion and neck supple.      Comments: No JVD  Cardiovascular:      Rate and Rhythm: Normal rate and regular rhythm.      Pulses: Normal pulses.      Heart sounds: Normal heart sounds. No murmur.   Pulmonary:      Effort: Pulmonary effort is normal.      Breath sounds: Examination of the right-lower field reveals decreased breath sounds. Examination of the left-lower field reveals rales. Decreased breath sounds and rales present.   Abdominal:      Palpations: Abdomen is soft.   Skin:     General: Skin is warm and dry.   Neurological:      General: No focal deficit present.      Mental Status: She is alert and oriented to person, place, and time.   Psychiatric:         Mood and Affect: Mood normal.         Behavior: Behavior normal.         Lab Review  Lab Results   Component Value Date/Time    WBC 6.9 12/16/2020 12:38 PM    RBC 5.67 (H) 12/16/2020 12:38 PM    HEMOGLOBIN 16.8 (H) 12/16/2020 12:38 PM    HEMATOCRIT 50.9 (H) 12/16/2020 12:38 PM    MCV 89.8 12/16/2020 12:38 PM    MCH 29.6 12/16/2020 12:38 PM    MCHC 33.0 (L) 12/16/2020 12:38 PM    MPV 12.5 12/16/2020 12:38 PM      Lab Results   Component Value Date/Time    SODIUM 137 12/16/2020 12:38 PM    POTASSIUM 4.0 12/16/2020 12:38 PM    CHLORIDE 100 12/16/2020 12:38 PM    CO2 25 12/16/2020 12:38 PM    GLUCOSE 97 12/16/2020 12:38 PM    BUN 17 12/16/2020 12:38 PM    CREATININE 0.71 12/16/2020 12:38 PM      Lab Results   Component Value Date/Time    ASTSGOT 15 12/16/2020 12:38 PM    ALTSGPT 12 12/16/2020 12:38 PM     Lab Results   Component Value Date/Time    CHOLSTRLTOT 134 07/15/2020 10:34 AM    LDL 60 07/15/2020 10:34 AM    HDL 61 07/15/2020 10:34 AM    TRIGLYCERIDE 64 07/15/2020 10:34 AM    TROPONINT 25 (H) 12/17/2020 06:31 AM       Recent Labs     12/17/20  0631    NTPROBNP 5057*       Cardiac Imaging and Procedures Review  EKG:  My personal interpretation of the EKG dated 12/16/2020 is SR with PACs or wondering atrial pacemaker    Echocardiogram: 12/16/2020  CONCLUSIONS  Severely reduced left ventricular systolic function.  Left ventricular ejection fraction is visually estimated to be 30%.  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  Otherwise global hypokinesis.  Grade I diastolic dysfunction.  Mild concentric left ventricular hypertrophy.  The right ventricle was normal in size and function.  No significant valvular regurgitation or stenosis.   Right atrial pressure is estimated to be 3 mmHg.  Estimated right ventricular systolic pressure is 39 mmHg.  No prior study is available for comparison.   Dr. Carlson aware of study findings at the time the echocardiogram was   performed.      Imaging  Chest X-Ray: 12/16/2020  No acute cardiopulmonary process    Stress Test: 12/17/2020   Myocardial Perfusion Report   NUCLEAR IMAGING INTERPRETATION   No evidence of significant jeopardized viable myocardium or prior myocardial    infarction.   LV EF is 47% with diffuse hypokinesis and dyskinesis in the inferoseptal    myocardium. Previous EF by ECHO is 30%. Discrepancy is likely artifactual.    Please refer to ECHO as a more accurate estimate.    Assessment/Plan  1. LBBB  -MPI 12/17/2020, no evidence of significant jeopardized by a focal myocardium or prior myocardial infarction with diffuse hypokinesis and dyskinesis of the inferoseptal myocardium  -Continue atorvastatin 80 mg every evening  -Decrease ASA 81 mg    2.  HTN  -Well controlled   -107/61    3.  Newly diagnosed heart failure, EF 30%, Stage 3 class C  -Start lisinopril 2.5 mg daily and metoprolol SR 12.5 mg daily  -Monitor blood pressure  -Will start spironolactone as outpt due to hypotension  -Nursing to give her heart failure book  -Discussed daily weights, sodium and fluid restriction, need to  check blood pressure with starting new medications, patient states that she will obtain BP cuff  -Start digoxin 0.125 mg daily    Thank you for allowing me to participate in the care of this patient.  Cardiology will sign off.    Please contact me with any questions.        MYLA Calzada  [facility]  [phone]    Future Appointments   Date Time Provider Department Center   12/24/2020  9:30 AM JEFFREY Freedman. [facility] None         Please note that this dictation was created using voice recognition software. I have worked with consultants from the vendor as well as technical experts from [facility] to optimize the interface. I have made every reasonable attempt to correct obvious errors, but I expect that there are errors of grammar and possibly content I did not discover before finalizing the note.

## 2020-12-18 NOTE — PROGRESS NOTES
Monitor summary: Sinus Rhythm-Sinus Tachycardia with bundle branch block    Frequent PACs, rare PVCs  0.16 / 0.08 / 0.40

## 2020-12-18 NOTE — DISCHARGE INSTRUCTIONS
Please follow up with Cardiology at your scheduled appointment on Dec 24. Return to ER if worsening symptoms. Please continue all meds as prescribed and take your blood pressure at home at least daily.     Discharge Instructions    Discharged to home by car with relative. Discharged via walking, hospital escort: Yes.  Special equipment needed: Not Applicable    Be sure to schedule a follow-up appointment with your primary care doctor or any specialists as instructed.     Discharge Plan:   Diet Plan: Discussed  Activity Level: Discussed  Confirmed Follow up Appointment: Appointment Scheduled  Confirmed Symptoms Management: Discussed  Medication Reconciliation Updated: Yes  Influenza Vaccine Indication: Patient Refuses    I understand that a diet low in cholesterol, fat, and sodium is recommended for good health. Unless I have been given specific instructions below for another diet, I accept this instruction as my diet prescription.   Other diet: cardiac    Special Instructions:   HF Patient Discharge Instructions  · Monitor your weight daily, and maintain a weight chart, to track your weight changes.   · Activity as tolerated, unless your Doctor has ordered otherwise. Other activity order: as tolerated.  · Follow a low fat, low cholesterol, low salt diet unless instructed otherwise by your Doctor. Read the labels on the back of food products and track your intake of fat, cholesterol and salt.   · Fluid Restriction No. If a Fluid Restriction has been ordered by your Doctor, measure fluids with a measuring cup to ensure that you are not exceeding the restriction.   · No smoking.  · Oxygen No. If your Doctor has ordered that you wear Oxygen at home, it is important to wear it as ordered.  · Did you receive an explanation from staff on the importance of taking each of your medications and why it is necessary to keep taking them unless your doctor says to stop? Yes  · Were all of your questions answered about how to  manage your heart failure and what to do if you have increased signs and symptoms after you go home? Yes  · Do you feel like your heart failure care team involved you in the care treatment plan and allowed you to make decisions regarding your care while in the hospital and addressed any discharge needs you might have? Yes    See the educational handout provided at discharge for more information on monitoring your daily weight, activity and diet. This also explains more about Heart Failure, symptoms of a flare-up and some of the tests that you have undergone.     Warning Signs of a Flare-Up include:  · Swelling in the ankles or lower legs.  · Shortness of breath, while at rest, or while doing normal activities.   · Shortness of breath at night when in bed, or coughing in bed.   · Requiring more pillows to sleep at night, or needing to sit up at night to sleep.  · Feeling weak, dizzy or fatigued.     When to call your Doctor:  · Call [facility] seven days a week from 8:00 a.m. to 8:00 p.m. for medical questions [phone].  · Call your Primary Care Physician or Cardiologist if:   1. You experience any pain radiating to your jaw or neck.  2. You have any difficulty breathing.  3. You experience weight gain of 3 lbs in a day or 5 lbs in a week.   4. You feel any palpitations or irregular heartbeats.  5. You become dizzy or lose consciousness.   If you have had an angiogram or had a pacemaker or AICD placed, and experience:  1. Bleeding, drainage or swelling at the surgical / puncture site.  2. Fever greater than 100.0 F  3. Shock from internal defibrillator.  4. Cool and / or numb extremities.      · Is patient discharged on Warfarin / Coumadin?   No      Metoprolol extended-release tablets  What is this medicine?  METOPROLOL (me TOE proe lole) is a beta-blocker. Beta-blockers reduce the workload on the heart and help it to beat more regularly. This medicine is used to treat high blood pressure and to  prevent chest pain. It is also used to after a heart attack and to prevent an additional heart attack from occurring.  This medicine may be used for other purposes; ask your health care provider or pharmacist if you have questions.  COMMON BRAND NAME(S): toprol, Toprol XL  What should I tell my health care provider before I take this medicine?  They need to know if you have any of these conditions:  diabetes  heart or vessel disease like slow heart rate, worsening heart failure, heart block, sick sinus syndrome or Raynaud's disease  kidney disease  liver disease  lung or breathing disease, like asthma or emphysema  pheochromocytoma  thyroid disease  an unusual or allergic reaction to metoprolol, other beta-blockers, medicines, foods, dyes, or preservatives  pregnant or trying to get pregnant  breast-feeding  How should I use this medicine?  Take this medicine by mouth with a glass of water. Follow the directions on the prescription label. Do not crush or chew. Take this medicine with or immediately after meals. Take your doses at regular intervals. Do not take more medicine than directed. Do not stop taking this medicine suddenly. This could lead to serious heart-related effects.  Talk to your pediatrician regarding the use of this medicine in children. While this drug may be prescribed for children as young as 6 years for selected conditions, precautions do apply.  Overdosage: If you think you have taken too much of this medicine contact a poison control center or emergency room at once.  NOTE: This medicine is only for you. Do not share this medicine with others.  What if I miss a dose?  If you miss a dose, take it as soon as you can. If it is almost time for your next dose, take only that dose. Do not take double or extra doses.  What may interact with this medicine?  This medicine may interact with the following medications:  certain medicines for blood pressure, heart disease, irregular heart beat  certain  medicines for depression, like monoamine oxidase (MAO) inhibitors, fluoxetine, or paroxetine  clonidine  dobutamine  epinephrine  isoproterenol  reserpine  This list may not describe all possible interactions. Give your health care provider a list of all the medicines, herbs, non-prescription drugs, or dietary supplements you use. Also tell them if you smoke, drink alcohol, or use illegal drugs. Some items may interact with your medicine.  What should I watch for while using this medicine?  Visit your doctor or health care professional for regular check ups. Contact your doctor right away if your symptoms worsen. Check your blood pressure and pulse rate regularly. Ask your health care professional what your blood pressure and pulse rate should be, and when you should contact them.  You may get drowsy or dizzy. Do not drive, use machinery, or do anything that needs mental alertness until you know how this medicine affects you. Do not sit or stand up quickly, especially if you are an older patient. This reduces the risk of dizzy or fainting spells. Contact your doctor if these symptoms continue. Alcohol may interfere with the effect of this medicine. Avoid alcoholic drinks.  This medicine may increase blood sugar. Ask your healthcare provider if changes in diet or medicines are needed if you have diabetes.  What side effects may I notice from receiving this medicine?  Side effects that you should report to your doctor or health care professional as soon as possible:  allergic reactions like skin rash, itching or hives  cold or numb hands or feet  depression  difficulty breathing  faint  fever with sore throat  irregular heartbeat, chest pain  rapid weight gain    signs and symptoms of high blood sugar such as being more thirsty or hungry or having to urinate more than normal. You may also feel very tired or have blurry vision.  swollen legs or ankles  Side effects that usually do not require medical attention (report  to your doctor or health care professional if they continue or are bothersome):  anxiety or nervousness  change in sex drive or performance  dry skin  headache  nightmares or trouble sleeping  short term memory loss  stomach upset or diarrhea  This list may not describe all possible side effects. Call your doctor for medical advice about side effects. You may report side effects to FDA at 4-839-KDU-9862.  Where should I keep my medicine?  Keep out of the reach of children.  Store at room temperature between 15 and 30 degrees C (59 and 86 degrees F). Throw away any unused medicine after the expiration date.  NOTE: This sheet is a summary. It may not cover all possible information. If you have questions about this medicine, talk to your doctor, pharmacist, or health care provider.  © 2020 [employer]/Gold Standard (2019-10-08 11:09:41)  Lisinopril tablets  What is this medicine?  LISINOPRIL (lyse IN oh pril) is an ACE inhibitor. This medicine is used to treat high blood pressure and heart failure. It is also used to protect the heart immediately after a heart attack.  This medicine may be used for other purposes; ask your health care provider or pharmacist if you have questions.  COMMON BRAND NAME(S): Prinivil, Zestril  What should I tell my health care provider before I take this medicine?  They need to know if you have any of these conditions:  diabetes  heart or blood vessel disease  kidney disease  low blood pressure  previous swelling of the tongue, face, or lips with difficulty breathing, difficulty swallowing, hoarseness, or tightening of the throat  an unusual or allergic reaction to lisinopril, other ACE inhibitors, insect venom, foods, dyes, or preservatives  pregnant or trying to get pregnant  breast-feeding  How should I use this medicine?  Take this medicine by mouth with a glass of water. Follow the directions on your prescription label. You may take this medicine with or without food. If it upsets your  stomach, take it with food. Take your medicine at regular intervals. Do not take it more often than directed. Do not stop taking except on your doctor's advice.  Talk to your pediatrician regarding the use of this medicine in children. Special care may be needed. While this drug may be prescribed for children as young as 6 years of age for selected conditions, precautions do apply.  Overdosage: If you think you have taken too much of this medicine contact a poison control center or emergency room at once.  NOTE: This medicine is only for you. Do not share this medicine with others.  What if I miss a dose?  If you miss a dose, take it as soon as you can. If it is almost time for your next dose, take only that dose. Do not take double or extra doses.  What may interact with this medicine?  Do not take this medicine with any of the following medications:  hymenoptera venom  sacubitril; valsartan  This medicines may also interact with the following medications:  aliskiren  angiotensin receptor blockers, like losartan or valsartan  certain medicines for diabetes  diuretics  everolimus  gold compounds  lithium  NSAIDs, medicines for pain and inflammation, like ibuprofen or naproxen  potassium salts or supplements  salt substitutes  sirolimus  temsirolimus  This list may not describe all possible interactions. Give your health care provider a list of all the medicines, herbs, non-prescription drugs, or dietary supplements you use. Also tell them if you smoke, drink alcohol, or use illegal drugs. Some items may interact with your medicine.  What should I watch for while using this medicine?  Visit your doctor or health care professional for regular check ups. Check your blood pressure as directed. Ask your doctor what your blood pressure should be, and when you should contact him or her.  Do not treat yourself for coughs, colds, or pain while you are using this medicine without asking your doctor or health care professional  for advice. Some ingredients may increase your blood pressure.  Women should inform their doctor if they wish to become pregnant or think they might be pregnant. There is a potential for serious side effects to an unborn child. Talk to your health care professional or pharmacist for more information.  Check with your doctor or health care professional if you get an attack of severe diarrhea, nausea and vomiting, or if you sweat a lot. The loss of too much body fluid can make it dangerous for you to take this medicine.  You may get drowsy or dizzy. Do not drive, use machinery, or do anything that needs mental alertness until you know how this drug affects you. Do not stand or sit up quickly, especially if you are an older patient. This reduces the risk of dizzy or fainting spells. Alcohol can make you more drowsy and dizzy. Avoid alcoholic drinks.  Avoid salt substitutes unless you are told otherwise by your doctor or health care professional.  What side effects may I notice from receiving this medicine?  Side effects that you should report to your doctor or health care professional as soon as possible:  allergic reactions like skin rash, itching or hives, swelling of the hands, feet, face, lips, throat, or tongue  breathing problems  signs and symptoms of kidney injury like trouble passing urine or change in the amount of urine  signs and symptoms of increased potassium like muscle weakness; chest pain; or fast, irregular heartbeat  signs and symptoms of liver injury like dark yellow or brown urine; general ill feeling or flu-like symptoms; light-colored stools; loss of appetite; nausea; right upper belly pain; unusually weak or tired; yellowing of the eyes or skin  signs and symptoms of low blood pressure like dizziness; feeling faint or lightheaded, falls; unusually weak or tired  stomach pain with or without nausea and vomiting  Side effects that usually do not require medical attention (report to your doctor or  health care professional if they continue or are bothersome):  changes in taste  cough  dizziness  fever  headache  sensitivity to light  This list may not describe all possible side effects. Call your doctor for medical advice about side effects. You may report side effects to FDA at 2-278-LCQ-6500.  Where should I keep my medicine?  Keep out of the reach of children.  Store at room temperature between 15 and 30 degrees C (59 and 86 degrees F). Protect from moisture. Keep container tightly closed. Throw away any unused medicine after the expiration date.  NOTE: This sheet is a summary. It may not cover all possible information. If you have questions about this medicine, talk to your doctor, pharmacist, or health care provider.  © 2020 [employer]/Gold Standard (2017-02-06 12:52:35)  Aspirin and Your Heart    Aspirin is a medicine that prevents the cells in the blood that are used for clotting, called platelets, from sticking together. Aspirin can be used to help reduce the risk of blood clots, heart attacks, and other heart-related problems.  Can I take aspirin?  Your health care provider will help you determine whether it is safe and beneficial for you to take aspirin daily. Taking aspirin daily may be helpful if you:  Have had a heart attack or chest pain.  Are at risk for a heart attack.  Have undergone open-heart surgery, such as coronary artery bypass surgery (CABG).  Have had coronary angioplasty or a stent.  Have had certain types of stroke or transient ischemic attack (TIA).  Have peripheral artery disease (PAD).  Have chronic heart rhythm problems such as atrial fibrillation and cannot take an anticoagulant.  Have valve disease or have had surgery on a valve.  What are the risks?  Daily use of aspirin can cause side effects. Some of these include:  Bleeding. Bleeding problems can be minor or serious. An example of a minor problem is a cut that does not stop bleeding. An example of a more serious problem is  stomach bleeding or, rarely, bleeding into the brain. Your risk of bleeding is increased if you are also taking non-steroidal anti-inflammatory drugs (NSAIDs).  Increased bruising.  Upset stomach.  An allergic reaction. People who have nasal polyps have an increased risk of developing an aspirin allergy.  General guidelines  Take aspirin only as told by your health care provider. Make sure that you understand how much you should take and what form you should take. The two forms of aspirin are:  Non-enteric-coated.This type of aspirin does not have a coating and is absorbed quickly. This type of aspirin also comes in a chewable form.  Enteric-coated. This type of aspirin has a coating that releases the medicine very slowly. Enteric-coated aspirin might cause less stomach upset than non-enteric-coated aspirin. This type of aspirin should not be chewed or crushed.  Limit alcohol intake to no more than 1 drink a day for nonpregnant women and 2 drinks a day for men. Drinking alcohol increases your risk of bleeding. One drink equals 12 oz of beer, 5 oz of wine, or 1½ oz of hard liquor.  Contact a health care provider if you:  Have unusual bleeding or bruising.  Have stomach pain or nausea.  Have ringing in your ears.  Have an allergic reaction that causes:  Hives.  Itchy skin.  Swelling of the lips, tongue, or face.  Get help right away if you:  Notice that your bowel movements are bloody, dark red, or black in color.  Vomit or cough up blood.  Have blood in your urine.  Cough, have noisy breathing (wheeze), or feel short of breath.  Have chest pain, especially if the pain spreads to the arms, back, neck, or jaw.  Have a severe headache, or a headache with confusion, or dizziness.  These symptoms may represent a serious problem that is an emergency. Do not wait to see if the symptoms will go away. Get medical help right away. Call your local emergency services (911 in the [place] Do not drive yourself to the  hospital.  Summary  Aspirin can be used to help reduce the risk of blood clots, heart attacks, and other heart-related problems.  Daily use of aspirin can increase your risk of side effects. Your health care provider will help you determine whether it is safe and beneficial for you to take aspirin daily.  Take aspirin only as told by your health care provider. Make sure that you understand how much you can take and what form you can take.  This information is not intended to replace advice given to you by your health care provider. Make sure you discuss any questions you have with your health care provider.  Document Released: 11/30/2009 Document Revised: 10/18/2018 Document Reviewed: 10/18/2018  [employer] Patient Education © 2020 [employer]      Depression / Suicide Risk    As you are discharged from this [facility] facility, it is important to learn how to keep safe from harming yourself.    Recognize the warning signs:  · Abrupt changes in personality, positive or negative- including increase in energy   · Giving away possessions  · Change in eating patterns- significant weight changes-  positive or negative  · Change in sleeping patterns- unable to sleep or sleeping all the time   · Unwillingness or inability to communicate  · Depression  · Unusual sadness, discouragement and loneliness  · Talk of wanting to die  · Neglect of personal appearance   · Rebelliousness- reckless behavior  · Withdrawal from people/activities they love  · Confusion- inability to concentrate     If you or a loved one observes any of these behaviors or has concerns about self-harm, here's what you can do:  · Talk about it- your feelings and reasons for harming yourself  · Remove any means that you might use to hurt yourself (examples: pills, rope, extension cords, firearm)  · Get professional help from the community (Mental Health, Substance Abuse, psychological counseling)  · Do not be alone:Call your Safe Contact- someone whom you  trust who will be there for you.  · Call your local CRISIS HOTLINE 985-7656 or 161-644-3675  · Call your local Children's Mobile Crisis Response Team [place] (454) 331-6506 or www.[employer]  · Call the toll free National Suicide Prevention Hotlines   · National Suicide Prevention Lifeline 184-266-GABG (8794)  · National [place] Line Network 800-SUICIDE (868-6512)

## 2020-12-18 NOTE — DISCHARGE SUMMARY
[facility] MEDICINE DISCHARGE SUMMARY     PATIENT ID:  Name:             Mayra Veloz   YOB: 1940  Age:                 80 y.o.  female   MRN:               [id number]  Address:         [address] [place] [address]  Phone:            [phone] (home)    ADMISSION DATE:   12/16/2020    DISCHARGE DATE:   12/18/2020    DISCHARGE DIAGNOSES:   Osteoarthritis  Dyslipidemia  Hyperlipidemia  Heart failure, EF 30%, stage III class C    ATTENDING PHYSICIAN:   Dr Muhammad    RESIDENT:   Juanpablo Miller DO    CONSULTANTS:    Cardiology    PROCEDURES:    NM stress test    IMAGING:   NM-CARDIAC STRESS TEST   Final Result      EC-ECHOCARDIOGRAM COMPLETE W/O CONT   Final Result      CT-HEAD W/O   Final Result      1.  Chronic microvascular ischemic type changes.   2.  No acute intracranial abnormality.      DX-CHEST-PORTABLE (1 VIEW)   Final Result      No acute cardiopulmonary abnormality.          PHYSICAL EXAM:   General: Cachetic, no acute distress, afebrile, resting comfortably sitting up on side of bed, conversant  HEENT: NC/AT. EOMI, MMM.   Cardiovascular: RRR without murmurs. Normal capillary refill   Respiratory: Wheezing throughout upper and lower lung fields, no tachypnea or retractions  Abdomen: soft, nontender, nondistended, no masses  EXT:  [place], no edema  Skin: No erythema/lesions   Neuro: Non-focal    LABS:  Recent Labs     12/16/20  1238   WBC 6.9   RBC 5.67*   HEMOGLOBIN 16.8*   HEMATOCRIT 50.9*   MCV 89.8   MCH 29.6   RDW 45.8   PLATELETCT 154*   MPV 12.5   NEUTSPOLYS 74.90*   LYMPHOCYTES 19.00*   MONOCYTES 5.10   EOSINOPHILS 0.30   BASOPHILS 0.60     Recent Labs     12/16/20  1238   SODIUM 137   POTASSIUM 4.0   CHLORIDE 100   CO2 25   GLUCOSE 97   BUN 17     Lab Results   Component Value Date/Time    CHOLSTRLTOT 134 07/15/2020 10:34 AM    LDL 60 07/15/2020 10:34 AM    HDL 61 07/15/2020 10:34 AM    TRIGLYCERIDE 64 07/15/2020 10:34 AM       Lab Results    Component Value Date/Time    TROPONINI <0.01 03/11/2013 11:50 AM       Lab Results   Component Value Date/Time    TROPONINI <0.01 03/11/2013 11:50 AM            HOSPITAL COURSE:   Mayra Veloz is a 80 y.o. female with a past medical history of dyslipidemia, tobacco use, osteoarthritis who presents to the ED for dizziness for 3 days and hypertensive emergency.  In the ED noted to have an elevated blood pressure of 209/103, and EKG showed sinus rhythm with new left bundle branch block and rapid ventricular rhythm.  Dr. Carlson of cardiology was consulted for further evaluation.  Echo performed showed an EF of 35 to 40%    Patient was admitted to the telemetry floor for cardiac monitoring.  Serial troponins went from 13, to 15, to 25.  Nuclear stress test was performed which showed no ischemia but clear prior inferior basal infarct.  Patient's blood pressures remained stable throughout her stay and she was initiated on lisinopril.  Patient had an episode of A. fib but remained in sinus rhythm otherwise throughout her hospital visit    Cardiology evaluated the patient and read her stress test as no evidence of ischemia but a clear prior inferior infarct.  Patient with new diagnosis of stage III class C heart failure and cardiology recommended beta-blocker, ACE inhibitor, spironolactone (as BP tolerates, will likely initiate as outpatient).  Patient was also started on digoxin 0.125 mg daily.     Prior to discharge patient was reevaluated and is doing well.  Patient will be discharged home in stable condition and was instructed to follow-up with any new or worsening complaints.  She to follow-up with her PCP in 1 to 2 weeks and should follow-up with cardiology as scheduled.  Patient's new medication regiment includes    -Metoprolol SR 12.5 mg daily  -Lisinopril 2.5 mg daily  -Digoxin 0.125 mg daily  -Atorvastatin 40 mg daily      DISCHARGE CONDITION:    Stable    DISPOSITION:   Home    DISCHARGE MEDICATIONS:    Roselia  Vargas   Home Medication Instructions CHRISTINA:[id number]    Printed on:12/18/20 6753   Medication Information                      alendronate (FOSAMAX) 70 MG Tab  TAKE 1 TABLET BY MOUTH EVERY 7 DAYS. PT TO MAKE APPT PRIOR TO MORE REFILLS.             aspirin EC 81 MG EC tablet  Take 1 Tab by mouth every day.             atorvastatin (LIPITOR) 40 MG Tab  Take 1 Tab by mouth every day.             CALCIUM PO  Take 1 Tab by mouth every morning.             Cholecalciferol (VITAMIN D3) 3000 UNITS Tab  Take 3,000 Units by mouth every evening.             ibuprofen (MOTRIN) 200 MG Tab  Take 200 mg by mouth every 6 hours as needed for Mild Pain, Headache or Inflammation.             Iron-Vitamins (GERITOL) Liquid  Take 5 mL by mouth every day.             lisinopril (PRINIVIL) 2.5 MG Tab  Take 1 Tab by mouth every day.             meclizine (ANTIVERT) 25 MG TABS  Take 1 Tab by mouth 3 times a day as needed. Indications: Sensation of Spinning or Whirling             meloxicam (MOBIC) 7.5 MG Tab  TAKE 1 TABLET BY MOUTH EVERY DAY             metoprolol SR (TOPROL XL) 25 MG TABLET SR 24 HR  Take 0.5 Tabs by mouth every day.                  ACTIVITY:   Normal Activity as Tolerated.    DIET:   Healthy    DISCHARGE INSTRUCTIONS AND FOLLOW UP:  Patient is medically stable for discharge and will be discharged to home    Follow Up: PCP and cardiology    Discharge Instructions:   Patient was instructed to return the ER in the event of worsening symptoms including but not limited to chest pain, shortness of breath or any other major concerns. Patient understands that failure to do so may indicate worsening of her medical condition(s) and result in adverse clinical outcomes including fatality. We have counseled the patient on the importance of compliance and the patient has agreed to proceed with all medical recommendations and follow up plan indicated above. The patient understands that the failure to do so may result in result in adverse  clinical outcomes including fatality.       CC:   Tonya Stack M.D.

## 2020-12-23 PROBLEM — I50.9: Status: ACTIVE | Noted: 2020-01-01

## 2020-12-24 NOTE — PATIENT INSTRUCTIONS
Continue metoprolol half tablet daily  Continue atorvastatin 40 mg 1 tablet at night  Increase lisinopril to 5 mg daily 2 tablets UNTIL you  you new prescription bottle with the increased dose.     Check BP at home  Checking Blood Pressure:  -Blood pressure cuff, spend in the $40-65, with good return policy  -It should be automatic, upper arm, measure your arm to get the correct size, probably adult Large  -Put the cuff in place, rest arm on table near height of your heart, sit quietly for 5 min, legs uncrossed, with back support, then take your blood pressure, write it down, keep a log  -Check no more than 1 time day, maybe 4-5 times per week, try different times of day.  -Check 2 hours after morning blood pressure medication  -Can bring your cuff to at least one appointment where it can be calibrated to a manual cuff if you are concerned.  -Goal blood pressure is at least under 130/80, ideally under 120/80.  If you think your BP is overall too high, let us know in the office, we can adjust medications, can use [employer] Fans or call the [place] office: [phone].    Salt=sodium=sea salt, guidelines say stay under 2,500 mg daily but I ask for under 4,000 mg daily.  Get salt smart, start looking at labels, count it up.  Salt is hidden in everything, salad dressing, sauces, cheese, most canned food, any processed meat.

## 2020-12-24 NOTE — PROGRESS NOTES
Chief Complaint   Patient presents with   • Congestive Heart Failure       Subjective:   Vargas Veloz is a 80 y.o. female who presents today for new asymptomatic LV dysfunction, and LBBB follow-up after recent admission to the hospital on 12/16/2020. Pt is not known to clinic, but was followed by Dr. Carlson during admission.     Pt has PMH of dyslipidemia, OA, hypertension, COPD, and tobacco dependence. Pt reports continued tobacco use where she smokes 0.25 PPD. She had cut down but is not interested in quitting at this time.     Today, Pt reports dizziness. However, pt reports that she was diagnosed with an internal ear problems years ago, and that the dizziness is baseline and improves with her prescribed meclizine. Pt reports home BP ranging from 125-209/100-130's. Pt is unsure is her home BP is accurate. Encouraged patient to bring in BP cuff at next appointment to assess. Written information was also provided regarding how to take BP at home. Pt reports that she is able to walk around her [place] park multiple times a day without shortness of breath. We discussed risk factor modification with includes a BP <130/90, low-sodium diet, and smoking cessation. Pt verbalizes understanding. Based on physical examination findings, patient is euvolemic. No JVD, lungs are clear to auscultation, no pitting edema in bilateral lower extremities, no ascites. Dry weight is 103 lbs.    Past Medical History:   Diagnosis Date   • Dyslipidemia 2/15/2013   • Osteoarthritis 2/6/2013   • Osteoporosis    • Thrombocytopenia (HCC) 2/15/2013     Past Surgical History:   Procedure Laterality Date   • ORIF, HUMERUS      age 9   • TUBAL COAGULATION LAPAROSCOPIC BILATERAL       Family History   Problem Relation Age of Onset   • Heart Disease Mother         MI   • Hypertension Mother    • Cancer Father         colon   • Lung Disease Father         COPD   • Heart Disease Sister         pacer placed   • Stroke Brother 50   • Heart Disease  Brother 60        pace maker   • Other Brother         Vertigo   • Other Son         Back issues work related   • No Known Problems Son    • No Known Problems Son    • Lung Disease Daughter         COPD   • Other Grandson         Kidney and liver failure   • Alcohol abuse Grandson      Social History     Socioeconomic History   • Marital status:      Spouse name: Not on file   • Number of children: Not on file   • Years of education: Not on file   • Highest education level: Not on file   Occupational History   • Not on file   Social Needs   • Financial resource strain: Not on file   • Food insecurity     Worry: Never true     Inability: Never true   • Transportation needs     Medical: No     Non-medical: No   Tobacco Use   • Smoking status: Current Every Day Smoker     Packs/day: 1.50     Years: 59.00     Pack years: 88.50     Types: Cigarettes   • Smokeless tobacco: Never Used   Substance and Sexual Activity   • Alcohol use: No     Alcohol/week: 0.0 oz     Frequency: Never     Binge frequency: Never   • Drug use: No   • Sexual activity: Never     Comment:  - 57 years   Lifestyle   • Physical activity     Days per week: Not on file     Minutes per session: Not on file   • Stress: Not on file   Relationships   • Social connections     Talks on phone: Not on file     Gets together: Not on file     Attends Taoist service: Not on file     Active member of club or organization: Not on file     Attends meetings of clubs or organizations: Not on file     Relationship status: Not on file   • Intimate partner violence     Fear of current or ex partner: Not on file     Emotionally abused: Not on file     Physically abused: Not on file     Forced sexual activity: Not on file   Other Topics Concern   • Not on file   Social History Narrative   • Not on file     Allergies   Allergen Reactions   • Sulfa Drugs Unspecified     Unknown reaction       Outpatient Encounter Medications as of 12/24/2020   Medication Sig  Dispense Refill   • lisinopril (PRINIVIL) 5 MG Tab Take 1 Tab by mouth every day. 30 Tab 5   • atorvastatin (LIPITOR) 40 MG Tab Take 1 Tab by mouth every day. 30 Tab 5   • metoprolol SR (TOPROL XL) 25 MG TABLET SR 24 HR Take 0.5 Tabs by mouth every day. 30 Tab 2   • aspirin EC 81 MG EC tablet Take 1 Tab by mouth every day. 30 Tab 0   • Iron-Vitamins (GERITOL) Liquid Take 5 mL by mouth every day.     • ibuprofen (MOTRIN) 200 MG Tab Take 200 mg by mouth every 6 hours as needed for Mild Pain, Headache or Inflammation.     • alendronate (FOSAMAX) 70 MG Tab TAKE 1 TABLET BY MOUTH EVERY 7 DAYS. PT TO MAKE APPT PRIOR TO MORE REFILLS. 12 Tab 0   • meloxicam (MOBIC) 7.5 MG Tab TAKE 1 TABLET BY MOUTH EVERY DAY (Patient taking differently: Take 7.5 mg by mouth 1 time a day as needed for Moderate Pain or Inflammation.) 90 Tab 0   • CALCIUM PO Take 1 Tab by mouth every morning.     • Cholecalciferol (VITAMIN D3) 3000 UNITS Tab Take 3,000 Units by mouth every evening.     • meclizine (ANTIVERT) 25 MG TABS Take 1 Tab by mouth 3 times a day as needed. Indications: Sensation of Spinning or Whirling 30 Tab 0   • [DISCONTINUED] atorvastatin (LIPITOR) 40 MG Tab Take 1 Tab by mouth every day. 30 Tab 0   • [DISCONTINUED] lisinopril (PRINIVIL) 2.5 MG Tab Take 1 Tab by mouth every day. 30 Tab 0   • [DISCONTINUED] metoprolol SR (TOPROL XL) 25 MG TABLET SR 24 HR Take 0.5 Tabs by mouth every day. 30 Tab 0     No facility-administered encounter medications on file as of 12/24/2020.      Review of Systems   Constitutional: Negative for chills, fever and weight loss.   Eyes: Negative for blurred vision.   Respiratory: Negative for cough and shortness of breath.    Cardiovascular: Negative for chest pain, palpitations, orthopnea, claudication, leg swelling and PND.   Gastrointestinal: Negative for blood in stool, nausea and vomiting.   Genitourinary: Negative for dysuria, frequency and hematuria.   Musculoskeletal: Negative for myalgias.  "  Neurological: Positive for dizziness (baseline). Negative for tingling and loss of consciousness.   Endo/Heme/Allergies: Does not bruise/bleed easily.        Objective:   /100 (BP Location: Left arm, Patient Position: Sitting, BP Cuff Size: Adult)   Resp 17   Ht 1.702 m (5' 7\")   Wt 47.1 kg (103 lb 12.8 oz)   BMI 16.26 kg/m²   Heart rate 65    Physical Exam   Constitutional: She is oriented to person, place, and time. She appears well-developed and well-nourished.   HENT:   Head: Normocephalic and atraumatic.   Eyes: Pupils are equal, round, and reactive to light. EOM are normal.   Neck: No JVD present.   Cardiovascular: Normal rate, regular rhythm, normal heart sounds and intact distal pulses. Exam reveals no gallop and no friction rub.   No murmur heard.  Pulmonary/Chest: Effort normal. No respiratory distress. She has decreased breath sounds in the right lower field and the left lower field.   Abdominal: Soft. Bowel sounds are normal. She exhibits no distension.   Musculoskeletal:         General: No edema.   Neurological: She is alert and oriented to person, place, and time.   Skin: Skin is warm and dry. No erythema.   Psychiatric: She has a normal mood and affect. Her behavior is normal.   Vitals reviewed.    Lab Results   Component Value Date/Time    CHOLSTRLTOT 134 07/15/2020 10:34 AM    LDL 60 07/15/2020 10:34 AM    HDL 61 07/15/2020 10:34 AM    TRIGLYCERIDE 64 07/15/2020 10:34 AM       Lab Results   Component Value Date/Time    SODIUM 137 12/16/2020 12:38 PM    POTASSIUM 4.0 12/16/2020 12:38 PM    CHLORIDE 100 12/16/2020 12:38 PM    CO2 25 12/16/2020 12:38 PM    GLUCOSE 97 12/16/2020 12:38 PM    BUN 17 12/16/2020 12:38 PM    CREATININE 0.71 12/16/2020 12:38 PM     Lab Results   Component Value Date/Time    ALKPHOSPHAT 98 12/16/2020 12:38 PM    ASTSGOT 15 12/16/2020 12:38 PM    ALTSGPT 12 12/16/2020 12:38 PM    TBILIRUBIN 1.7 (H) 12/16/2020 12:38 PM   EKG (12/16/2020): Sinus rhythm with PACs and " LBBB    Echo (12/16/2020): Severely reduced left ventricular systolic function.  Left ventricular ejection fraction is visually estimated to be 30%.  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  Otherwise global hypokinesis.  Grade I diastolic dysfunction.  Mild concentric left ventricular hypertrophy.  The right ventricle was normal in size and function.  No significant valvular regurgitation or stenosis.   Right atrial pressure is estimated to be 3 mmHg.  Estimated right ventricular systolic pressure is 39 mmHg.  No prior study is available for comparison.      NM Cardiac Stress Test (12/17/2020):    No evidence of significant jeopardized viable myocardium or prior myocardial    infarction.   LV EF is 47% with diffuse hypokinesis and dyskinesis in the inferoseptal    myocardium. Previous EF by ECHO is 30%. Discrepancy is likely artifactual.    Please refer to ECHO as a more accurate estimate.   ECG INTERPRETATION   Nondiagnostic ECG portion of a regadenoson nuclear stress test.  Assessment:     1. Stage B ACC/AHA asymptomatic heart failure (HCC)     2. LBBB (left bundle branch block)     3. Dyslipidemia     4. Essential hypertension     5. Tobacco abuse     6. Tobacco dependence         Medical Decision Making:  Today's Assessment / Status / Plan:   HFrEF, Stage B, Class I-II, LVEF 30%  -Heart failure due to prior infarct or toxin induced.  -Increase lisinopril 5 mg daily  -Continue Metoprolol XL 12.5 mg daily  -Repeat Echo in 3-6 months, if LVEF not >35%, then will discuss/consider ICD for primary Prevention  -Reinforced s/sx of worsening heart failure with patient and weight monitoring. Pt verbalizes understanding. Pt to call office or RTC if present.    -PUMP line number [phone] (PUMP)  -Heart Failure Education: pt to be contacted by HF nurse for further education, message Ines Cuellar RN for specifics if needed     LBBB  -. No indication for CRT at this  time    Hypertension  -Today in office blood pressure is well uncontrolled.  Home blood pressure recordings are reported as 125-209/100-130. We will verify her BP cuff function at next appointment. With her dizziness, we will increase her medication with close follow-up. Medication recommendations per above.    CAD  -Stress test showed prior, completed, inferior basal infarct  -Secondary prevention with ASA 81 mg and atorvastatin 40 mg daily     Tobacco Cessation  -I spent 5 minutes discussing the need for smoking/tobacco cessation. We discussed measures for quitting including replacements such as nicotine gum/nicotine patch. Pt declined these measures and interest in cessation at this time.     FU in clinic in 2 weeks. Sooner if needed.    Patient verbalizes understanding and agrees with the plan of care.     Collaborating MD:Dr. Clint MD    PLEASE NOTE: This Note was created using voice recognition Software. I have made every reasonable attempt to correct obvious errors, but I expect that there are errors of grammar and possibly content that I did not discover before finalizing the note

## 2020-12-29 NOTE — TELEPHONE ENCOUNTER
DEBRA    Pt called stating at her last appt on 12/24/20 she was told to double her dose of Lisinopril. Pt states she has been feeling dizzy and believes it is due to the medication. Please call Pt back at [phone]

## 2020-12-29 NOTE — TELEPHONE ENCOUNTER
Returned call. Pt reports that she has had problems with dizziness for quite some time, and usually finds relief with meclizine. She thought part of it could be due to her high BP and was expecting it to improve with the increased lisinopril dose on 12/24. Initially she did feel better after increasing to 5mg, but earlier this morning she felt dizzy again. She took a meclizine and feels better now. BP has been 140s-180s/80s, HR 70s since her appt, but her son told her she isn't using her cuff correctly. She is planning on bringing her cuff to her appt with DEBRA next week on 1/7. Informed that we can change BP meds accordingly if her high readings are confirmed at that time.

## 2021-01-01 ENCOUNTER — TELEPHONE (OUTPATIENT)
Dept: VASCULAR LAB | Facility: MEDICAL CENTER | Age: 81
End: 2021-01-01

## 2021-01-01 ENCOUNTER — HOME CARE VISIT (OUTPATIENT)
Dept: HOME HEALTH SERVICES | Facility: HOME HEALTHCARE | Age: 81
End: 2021-01-01
Payer: MEDICARE

## 2021-01-01 ENCOUNTER — ANTICOAGULATION MONITORING (OUTPATIENT)
Dept: MEDICAL GROUP | Facility: PHYSICIAN GROUP | Age: 81
End: 2021-01-01

## 2021-01-01 ENCOUNTER — APPOINTMENT (OUTPATIENT)
Dept: RADIOLOGY | Facility: MEDICAL CENTER | Age: 81
DRG: 177 | End: 2021-01-01
Attending: STUDENT IN AN ORGANIZED HEALTH CARE EDUCATION/TRAINING PROGRAM
Payer: MEDICARE

## 2021-01-01 ENCOUNTER — APPOINTMENT (OUTPATIENT)
Dept: CARDIOLOGY | Facility: MEDICAL CENTER | Age: 81
End: 2021-01-01
Payer: MEDICARE

## 2021-01-01 ENCOUNTER — HOSPITAL ENCOUNTER (OUTPATIENT)
Facility: MEDICAL CENTER | Age: 81
End: 2021-09-09
Attending: EMERGENCY MEDICINE | Admitting: STUDENT IN AN ORGANIZED HEALTH CARE EDUCATION/TRAINING PROGRAM
Payer: MEDICARE

## 2021-01-01 ENCOUNTER — HOSPITAL ENCOUNTER (OUTPATIENT)
Dept: LAB | Facility: MEDICAL CENTER | Age: 81
End: 2021-05-13
Attending: INTERNAL MEDICINE
Payer: MEDICARE

## 2021-01-01 ENCOUNTER — HOSPITAL ENCOUNTER (OUTPATIENT)
Dept: LAB | Facility: MEDICAL CENTER | Age: 81
End: 2021-01-21
Attending: NURSE PRACTITIONER
Payer: MEDICARE

## 2021-01-01 ENCOUNTER — TELEPHONE (OUTPATIENT)
Dept: CARDIOLOGY | Facility: MEDICAL CENTER | Age: 81
End: 2021-01-01

## 2021-01-01 ENCOUNTER — APPOINTMENT (OUTPATIENT)
Dept: RADIOLOGY | Facility: MEDICAL CENTER | Age: 81
DRG: 177 | End: 2021-01-01
Attending: EMERGENCY MEDICINE
Payer: MEDICARE

## 2021-01-01 ENCOUNTER — OFFICE VISIT (OUTPATIENT)
Dept: CARDIOLOGY | Facility: MEDICAL CENTER | Age: 81
End: 2021-01-01
Payer: MEDICARE

## 2021-01-01 ENCOUNTER — APPOINTMENT (OUTPATIENT)
Dept: RADIOLOGY | Facility: MEDICAL CENTER | Age: 81
End: 2021-01-01
Attending: EMERGENCY MEDICINE
Payer: MEDICARE

## 2021-01-01 ENCOUNTER — HOSPITAL ENCOUNTER (INPATIENT)
Facility: MEDICAL CENTER | Age: 81
LOS: 11 days | DRG: 177 | End: 2021-09-24
Attending: EMERGENCY MEDICINE | Admitting: STUDENT IN AN ORGANIZED HEALTH CARE EDUCATION/TRAINING PROGRAM
Payer: MEDICARE

## 2021-01-01 ENCOUNTER — NON-PROVIDER VISIT (OUTPATIENT)
Dept: CARDIOLOGY | Facility: MEDICAL CENTER | Age: 81
End: 2021-01-01
Payer: MEDICARE

## 2021-01-01 ENCOUNTER — APPOINTMENT (OUTPATIENT)
Dept: CARDIOLOGY | Facility: MEDICAL CENTER | Age: 81
DRG: 177 | End: 2021-01-01
Attending: INTERNAL MEDICINE
Payer: MEDICARE

## 2021-01-01 ENCOUNTER — HOSPITAL ENCOUNTER (OUTPATIENT)
Dept: CARDIOLOGY | Facility: MEDICAL CENTER | Age: 81
End: 2021-04-01
Attending: NURSE PRACTITIONER
Payer: MEDICARE

## 2021-01-01 ENCOUNTER — PATIENT MESSAGE (OUTPATIENT)
Dept: HEALTH INFORMATION MANAGEMENT | Facility: OTHER | Age: 81
End: 2021-01-01

## 2021-01-01 ENCOUNTER — DOCUMENTATION (OUTPATIENT)
Dept: VASCULAR LAB | Facility: MEDICAL CENTER | Age: 81
End: 2021-01-01

## 2021-01-01 ENCOUNTER — HOME HEALTH ADMISSION (OUTPATIENT)
Dept: HOME HEALTH SERVICES | Facility: HOME HEALTHCARE | Age: 81
End: 2021-01-01
Payer: MEDICARE

## 2021-01-01 ENCOUNTER — HOSPITAL ENCOUNTER (OUTPATIENT)
Dept: LAB | Facility: MEDICAL CENTER | Age: 81
End: 2021-04-20
Attending: INTERNAL MEDICINE
Payer: MEDICARE

## 2021-01-01 ENCOUNTER — ANTICOAGULATION MONITORING (OUTPATIENT)
Dept: VASCULAR LAB | Facility: MEDICAL CENTER | Age: 81
End: 2021-01-01

## 2021-01-01 ENCOUNTER — APPOINTMENT (OUTPATIENT)
Dept: RADIOLOGY | Facility: MEDICAL CENTER | Age: 81
End: 2021-01-01
Attending: STUDENT IN AN ORGANIZED HEALTH CARE EDUCATION/TRAINING PROGRAM
Payer: MEDICARE

## 2021-01-01 ENCOUNTER — DOCUMENTATION (OUTPATIENT)
Dept: RESPIRATORY THERAPY | Facility: MEDICAL CENTER | Age: 81
End: 2021-01-01

## 2021-01-01 VITALS
SYSTOLIC BLOOD PRESSURE: 116 MMHG | WEIGHT: 103 LBS | HEIGHT: 67 IN | OXYGEN SATURATION: 92 % | BODY MASS INDEX: 16.17 KG/M2 | HEART RATE: 82 BPM | DIASTOLIC BLOOD PRESSURE: 67 MMHG | RESPIRATION RATE: 18 BRPM | TEMPERATURE: 98.5 F

## 2021-01-01 VITALS — HEART RATE: 78 BPM | DIASTOLIC BLOOD PRESSURE: 97 MMHG | SYSTOLIC BLOOD PRESSURE: 137 MMHG

## 2021-01-01 VITALS
HEIGHT: 67 IN | WEIGHT: 106 LBS | OXYGEN SATURATION: 97 % | HEART RATE: 76 BPM | SYSTOLIC BLOOD PRESSURE: 162 MMHG | BODY MASS INDEX: 16.64 KG/M2 | DIASTOLIC BLOOD PRESSURE: 74 MMHG

## 2021-01-01 VITALS
WEIGHT: 102 LBS | BODY MASS INDEX: 15.98 KG/M2 | DIASTOLIC BLOOD PRESSURE: 96 MMHG | HEART RATE: 74 BPM | SYSTOLIC BLOOD PRESSURE: 156 MMHG

## 2021-01-01 VITALS
HEART RATE: 63 BPM | HEIGHT: 67 IN | WEIGHT: 104 LBS | OXYGEN SATURATION: 97 % | SYSTOLIC BLOOD PRESSURE: 164 MMHG | DIASTOLIC BLOOD PRESSURE: 80 MMHG | BODY MASS INDEX: 16.32 KG/M2

## 2021-01-01 VITALS
BODY MASS INDEX: 17.11 KG/M2 | SYSTOLIC BLOOD PRESSURE: 136 MMHG | HEART RATE: 87 BPM | HEIGHT: 67 IN | OXYGEN SATURATION: 96 % | DIASTOLIC BLOOD PRESSURE: 80 MMHG | WEIGHT: 109 LBS

## 2021-01-01 VITALS
RESPIRATION RATE: 16 BRPM | OXYGEN SATURATION: 92 % | TEMPERATURE: 98 F | BODY MASS INDEX: 15.51 KG/M2 | HEART RATE: 66 BPM | DIASTOLIC BLOOD PRESSURE: 72 MMHG | SYSTOLIC BLOOD PRESSURE: 112 MMHG | WEIGHT: 99 LBS

## 2021-01-01 VITALS
RESPIRATION RATE: 34 BRPM | SYSTOLIC BLOOD PRESSURE: 92 MMHG | OXYGEN SATURATION: 85 % | TEMPERATURE: 98 F | HEART RATE: 88 BPM | DIASTOLIC BLOOD PRESSURE: 50 MMHG

## 2021-01-01 VITALS
TEMPERATURE: 97.2 F | BODY MASS INDEX: 16.33 KG/M2 | RESPIRATION RATE: 27 BRPM | DIASTOLIC BLOOD PRESSURE: 51 MMHG | WEIGHT: 104.06 LBS | HEART RATE: 77 BPM | SYSTOLIC BLOOD PRESSURE: 99 MMHG | OXYGEN SATURATION: 90 % | HEIGHT: 67 IN

## 2021-01-01 DIAGNOSIS — I44.7 LBBB (LEFT BUNDLE BRANCH BLOCK): ICD-10-CM

## 2021-01-01 DIAGNOSIS — I10 ESSENTIAL HYPERTENSION: ICD-10-CM

## 2021-01-01 DIAGNOSIS — U07.1 PNEUMONIA DUE TO COVID-19 VIRUS: ICD-10-CM

## 2021-01-01 DIAGNOSIS — Z23 NEED FOR VACCINATION: ICD-10-CM

## 2021-01-01 DIAGNOSIS — E78.5 DYSLIPIDEMIA: ICD-10-CM

## 2021-01-01 DIAGNOSIS — Z79.899 HIGH RISK MEDICATION USE: ICD-10-CM

## 2021-01-01 DIAGNOSIS — I50.9 STAGE B ACC/AHA ASYMPTOMATIC HEART FAILURE (HCC): ICD-10-CM

## 2021-01-01 DIAGNOSIS — R42 DIZZINESS: ICD-10-CM

## 2021-01-01 DIAGNOSIS — Z72.0 TOBACCO ABUSE: ICD-10-CM

## 2021-01-01 DIAGNOSIS — I25.10 CORONARY ARTERY DISEASE INVOLVING NATIVE CORONARY ARTERY OF NATIVE HEART WITHOUT ANGINA PECTORIS: ICD-10-CM

## 2021-01-01 DIAGNOSIS — E55.9 VITAMIN D DEFICIENCY: ICD-10-CM

## 2021-01-01 DIAGNOSIS — R06.02 SOB (SHORTNESS OF BREATH): ICD-10-CM

## 2021-01-01 DIAGNOSIS — N17.9 AKI (ACUTE KIDNEY INJURY) (HCC): ICD-10-CM

## 2021-01-01 DIAGNOSIS — D69.6 THROMBOCYTOPENIA (HCC): ICD-10-CM

## 2021-01-01 DIAGNOSIS — M81.0 AGE RELATED OSTEOPOROSIS, UNSPECIFIED PATHOLOGICAL FRACTURE PRESENCE: ICD-10-CM

## 2021-01-01 DIAGNOSIS — F17.200 TOBACCO DEPENDENCE: ICD-10-CM

## 2021-01-01 DIAGNOSIS — R55 SYNCOPE, UNSPECIFIED SYNCOPE TYPE: ICD-10-CM

## 2021-01-01 DIAGNOSIS — J12.82 PNEUMONIA DUE TO COVID-19 VIRUS: ICD-10-CM

## 2021-01-01 DIAGNOSIS — I50.9 STAGE B ACC/AHA ASYMPTOMATIC HEART FAILURE (HCC): Primary | ICD-10-CM

## 2021-01-01 DIAGNOSIS — I49.9 CARDIAC ARRHYTHMIA, UNSPECIFIED CARDIAC ARRHYTHMIA TYPE: ICD-10-CM

## 2021-01-01 DIAGNOSIS — U07.1 COVID-19: ICD-10-CM

## 2021-01-01 DIAGNOSIS — R42 ORTHOSTATIC DIZZINESS: ICD-10-CM

## 2021-01-01 DIAGNOSIS — J41.0 SIMPLE CHRONIC BRONCHITIS (HCC): ICD-10-CM

## 2021-01-01 DIAGNOSIS — R06.00 DYSPNEA, UNSPECIFIED TYPE: ICD-10-CM

## 2021-01-01 LAB
25(OH)D3 SERPL-MCNC: 62 NG/ML (ref 30–100)
ALBUMIN SERPL BCP-MCNC: 2.4 G/DL (ref 3.2–4.9)
ALBUMIN SERPL BCP-MCNC: 2.5 G/DL (ref 3.2–4.9)
ALBUMIN SERPL BCP-MCNC: 3.1 G/DL (ref 3.2–4.9)
ALBUMIN SERPL BCP-MCNC: 3.5 G/DL (ref 3.2–4.9)
ALBUMIN SERPL BCP-MCNC: 4.1 G/DL (ref 3.2–4.9)
ALBUMIN SERPL BCP-MCNC: 4.2 G/DL (ref 3.2–4.9)
ALBUMIN/GLOB SERPL: 0.7 G/DL
ALBUMIN/GLOB SERPL: 0.7 G/DL
ALBUMIN/GLOB SERPL: 1.2 G/DL
ALBUMIN/GLOB SERPL: 1.3 G/DL
ALBUMIN/GLOB SERPL: 1.7 G/DL
ALBUMIN/GLOB SERPL: 1.7 G/DL
ALP SERPL-CCNC: 130 U/L (ref 30–99)
ALP SERPL-CCNC: 147 U/L (ref 30–99)
ALP SERPL-CCNC: 52 U/L (ref 30–99)
ALP SERPL-CCNC: 60 U/L (ref 30–99)
ALP SERPL-CCNC: 70 U/L (ref 30–99)
ALP SERPL-CCNC: 81 U/L (ref 30–99)
ALT SERPL-CCNC: 121 U/L (ref 2–50)
ALT SERPL-CCNC: 135 U/L (ref 2–50)
ALT SERPL-CCNC: 19 U/L (ref 2–50)
ALT SERPL-CCNC: 20 U/L (ref 2–50)
ALT SERPL-CCNC: 21 U/L (ref 2–50)
ALT SERPL-CCNC: 26 U/L (ref 2–50)
AMORPH CRY #/AREA URNS HPF: PRESENT /HPF
ANION GAP SERPL CALC-SCNC: 10 MMOL/L (ref 7–16)
ANION GAP SERPL CALC-SCNC: 11 MMOL/L (ref 7–16)
ANION GAP SERPL CALC-SCNC: 11 MMOL/L (ref 7–16)
ANION GAP SERPL CALC-SCNC: 12 MMOL/L (ref 7–16)
ANION GAP SERPL CALC-SCNC: 13 MMOL/L (ref 7–16)
ANION GAP SERPL CALC-SCNC: 13 MMOL/L (ref 7–16)
ANION GAP SERPL CALC-SCNC: 14 MMOL/L (ref 7–16)
ANION GAP SERPL CALC-SCNC: 14 MMOL/L (ref 7–16)
ANION GAP SERPL CALC-SCNC: 6 MMOL/L (ref 7–16)
ANION GAP SERPL CALC-SCNC: 9 MMOL/L (ref 7–16)
APPEARANCE UR: ABNORMAL
APPEARANCE UR: CLEAR
AST SERPL-CCNC: 20 U/L (ref 12–45)
AST SERPL-CCNC: 21 U/L (ref 12–45)
AST SERPL-CCNC: 25 U/L (ref 12–45)
AST SERPL-CCNC: 31 U/L (ref 12–45)
AST SERPL-CCNC: 53 U/L (ref 12–45)
AST SERPL-CCNC: 71 U/L (ref 12–45)
BACTERIA #/AREA URNS HPF: ABNORMAL /HPF
BACTERIA #/AREA URNS HPF: ABNORMAL /HPF
BACTERIA BLD CULT: NORMAL
BACTERIA UR CULT: NORMAL
BASOPHILS # BLD AUTO: 0 % (ref 0–1.8)
BASOPHILS # BLD AUTO: 0.2 % (ref 0–1.8)
BASOPHILS # BLD AUTO: 0.3 % (ref 0–1.8)
BASOPHILS # BLD AUTO: 0.3 % (ref 0–1.8)
BASOPHILS # BLD AUTO: 0.4 % (ref 0–1.8)
BASOPHILS # BLD AUTO: 0.6 % (ref 0–1.8)
BASOPHILS # BLD: 0 K/UL (ref 0–0.12)
BASOPHILS # BLD: 0.01 K/UL (ref 0–0.12)
BASOPHILS # BLD: 0.02 K/UL (ref 0–0.12)
BASOPHILS # BLD: 0.02 K/UL (ref 0–0.12)
BASOPHILS # BLD: 0.03 K/UL (ref 0–0.12)
BASOPHILS # BLD: 0.04 K/UL (ref 0–0.12)
BASOPHILS # BLD: 0.04 K/UL (ref 0–0.12)
BASOPHILS # BLD: 0.05 K/UL (ref 0–0.12)
BILIRUB SERPL-MCNC: 0.7 MG/DL (ref 0.1–1.5)
BILIRUB SERPL-MCNC: 0.9 MG/DL (ref 0.1–1.5)
BILIRUB SERPL-MCNC: 1.1 MG/DL (ref 0.1–1.5)
BILIRUB SERPL-MCNC: 1.1 MG/DL (ref 0.1–1.5)
BILIRUB SERPL-MCNC: 1.2 MG/DL (ref 0.1–1.5)
BILIRUB SERPL-MCNC: 1.8 MG/DL (ref 0.1–1.5)
BILIRUB UR QL STRIP.AUTO: NEGATIVE
BILIRUB UR QL STRIP.AUTO: NEGATIVE
BUN SERPL-MCNC: 19 MG/DL (ref 8–22)
BUN SERPL-MCNC: 19 MG/DL (ref 8–22)
BUN SERPL-MCNC: 20 MG/DL (ref 8–22)
BUN SERPL-MCNC: 20 MG/DL (ref 8–22)
BUN SERPL-MCNC: 22 MG/DL (ref 8–22)
BUN SERPL-MCNC: 29 MG/DL (ref 8–22)
BUN SERPL-MCNC: 40 MG/DL (ref 8–22)
BUN SERPL-MCNC: 45 MG/DL (ref 8–22)
BUN SERPL-MCNC: 50 MG/DL (ref 8–22)
BUN SERPL-MCNC: 51 MG/DL (ref 8–22)
BUN SERPL-MCNC: 57 MG/DL (ref 8–22)
BUN SERPL-MCNC: 61 MG/DL (ref 8–22)
BURR CELLS BLD QL SMEAR: NORMAL
CALCIUM SERPL-MCNC: 8.1 MG/DL (ref 8.5–10.5)
CALCIUM SERPL-MCNC: 8.2 MG/DL (ref 8.5–10.5)
CALCIUM SERPL-MCNC: 8.2 MG/DL (ref 8.5–10.5)
CALCIUM SERPL-MCNC: 8.4 MG/DL (ref 8.5–10.5)
CALCIUM SERPL-MCNC: 8.5 MG/DL (ref 8.5–10.5)
CALCIUM SERPL-MCNC: 8.5 MG/DL (ref 8.5–10.5)
CALCIUM SERPL-MCNC: 8.7 MG/DL (ref 8.5–10.5)
CALCIUM SERPL-MCNC: 8.7 MG/DL (ref 8.5–10.5)
CALCIUM SERPL-MCNC: 9.1 MG/DL (ref 8.5–10.5)
CALCIUM SERPL-MCNC: 9.2 MG/DL (ref 8.5–10.5)
CALCIUM SERPL-MCNC: 9.4 MG/DL (ref 8.5–10.5)
CALCIUM SERPL-MCNC: 9.6 MG/DL (ref 8.5–10.5)
CHLORIDE SERPL-SCNC: 101 MMOL/L (ref 96–112)
CHLORIDE SERPL-SCNC: 103 MMOL/L (ref 96–112)
CHLORIDE SERPL-SCNC: 104 MMOL/L (ref 96–112)
CHLORIDE SERPL-SCNC: 107 MMOL/L (ref 96–112)
CHLORIDE SERPL-SCNC: 108 MMOL/L (ref 96–112)
CHLORIDE SERPL-SCNC: 112 MMOL/L (ref 96–112)
CHLORIDE SERPL-SCNC: 112 MMOL/L (ref 96–112)
CHLORIDE SERPL-SCNC: 113 MMOL/L (ref 96–112)
CHLORIDE SERPL-SCNC: 117 MMOL/L (ref 96–112)
CHLORIDE SERPL-SCNC: 98 MMOL/L (ref 96–112)
CO2 SERPL-SCNC: 18 MMOL/L (ref 20–33)
CO2 SERPL-SCNC: 19 MMOL/L (ref 20–33)
CO2 SERPL-SCNC: 20 MMOL/L (ref 20–33)
CO2 SERPL-SCNC: 21 MMOL/L (ref 20–33)
CO2 SERPL-SCNC: 23 MMOL/L (ref 20–33)
CO2 SERPL-SCNC: 24 MMOL/L (ref 20–33)
CO2 SERPL-SCNC: 27 MMOL/L (ref 20–33)
CO2 SERPL-SCNC: 27 MMOL/L (ref 20–33)
COLOR UR: YELLOW
COLOR UR: YELLOW
CREAT SERPL-MCNC: 0.76 MG/DL (ref 0.5–1.4)
CREAT SERPL-MCNC: 0.81 MG/DL (ref 0.5–1.4)
CREAT SERPL-MCNC: 0.91 MG/DL (ref 0.5–1.4)
CREAT SERPL-MCNC: 0.91 MG/DL (ref 0.5–1.4)
CREAT SERPL-MCNC: 0.92 MG/DL (ref 0.5–1.4)
CREAT SERPL-MCNC: 0.93 MG/DL (ref 0.5–1.4)
CREAT SERPL-MCNC: 0.95 MG/DL (ref 0.5–1.4)
CREAT SERPL-MCNC: 1 MG/DL (ref 0.5–1.4)
CREAT SERPL-MCNC: 1.03 MG/DL (ref 0.5–1.4)
CREAT SERPL-MCNC: 1.08 MG/DL (ref 0.5–1.4)
CREAT SERPL-MCNC: 1.1 MG/DL (ref 0.5–1.4)
CREAT SERPL-MCNC: 1.23 MG/DL (ref 0.5–1.4)
CRP SERPL HS-MCNC: 10.26 MG/DL (ref 0–0.75)
CRP SERPL HS-MCNC: 10.45 MG/DL (ref 0–0.75)
CRP SERPL HS-MCNC: 4.75 MG/DL (ref 0–0.75)
D DIMER PPP IA.FEU-MCNC: 4.3 UG/ML (FEU) (ref 0–0.5)
EKG IMPRESSION: NORMAL
EOSINOPHIL # BLD AUTO: 0 K/UL (ref 0–0.51)
EOSINOPHIL # BLD AUTO: 0.01 K/UL (ref 0–0.51)
EOSINOPHIL # BLD AUTO: 0.06 K/UL (ref 0–0.51)
EOSINOPHIL NFR BLD: 0 % (ref 0–6.9)
EOSINOPHIL NFR BLD: 0.1 % (ref 0–6.9)
EOSINOPHIL NFR BLD: 0.8 % (ref 0–6.9)
EPI CELLS #/AREA URNS HPF: ABNORMAL /HPF
EPI CELLS #/AREA URNS HPF: NEGATIVE /HPF
ERYTHROCYTE [DISTWIDTH] IN BLOOD BY AUTOMATED COUNT: 45.8 FL (ref 35.9–50)
ERYTHROCYTE [DISTWIDTH] IN BLOOD BY AUTOMATED COUNT: 46.6 FL (ref 35.9–50)
ERYTHROCYTE [DISTWIDTH] IN BLOOD BY AUTOMATED COUNT: 47.3 FL (ref 35.9–50)
ERYTHROCYTE [DISTWIDTH] IN BLOOD BY AUTOMATED COUNT: 47.6 FL (ref 35.9–50)
ERYTHROCYTE [DISTWIDTH] IN BLOOD BY AUTOMATED COUNT: 48.4 FL (ref 35.9–50)
ERYTHROCYTE [DISTWIDTH] IN BLOOD BY AUTOMATED COUNT: 51.8 FL (ref 35.9–50)
ERYTHROCYTE [DISTWIDTH] IN BLOOD BY AUTOMATED COUNT: 51.9 FL (ref 35.9–50)
ERYTHROCYTE [DISTWIDTH] IN BLOOD BY AUTOMATED COUNT: 52.3 FL (ref 35.9–50)
ERYTHROCYTE [DISTWIDTH] IN BLOOD BY AUTOMATED COUNT: 53.3 FL (ref 35.9–50)
ERYTHROCYTE [SEDIMENTATION RATE] IN BLOOD BY WESTERGREN METHOD: 20 MM/HOUR (ref 0–25)
EST. AVERAGE GLUCOSE BLD GHB EST-MCNC: 137 MG/DL
FASTING STATUS PATIENT QL REPORTED: NORMAL
FLUAV RNA SPEC QL NAA+PROBE: NEGATIVE
FLUBV RNA SPEC QL NAA+PROBE: NEGATIVE
GAMMA INTERFERON BACKGROUND BLD IA-ACNC: 0.05 IU/ML
GLOBULIN SER CALC-MCNC: 2.4 G/DL (ref 1.9–3.5)
GLOBULIN SER CALC-MCNC: 2.5 G/DL (ref 1.9–3.5)
GLOBULIN SER CALC-MCNC: 2.6 G/DL (ref 1.9–3.5)
GLOBULIN SER CALC-MCNC: 2.8 G/DL (ref 1.9–3.5)
GLOBULIN SER CALC-MCNC: 3.4 G/DL (ref 1.9–3.5)
GLOBULIN SER CALC-MCNC: 3.5 G/DL (ref 1.9–3.5)
GLUCOSE SERPL-MCNC: 106 MG/DL (ref 65–99)
GLUCOSE SERPL-MCNC: 108 MG/DL (ref 65–99)
GLUCOSE SERPL-MCNC: 108 MG/DL (ref 65–99)
GLUCOSE SERPL-MCNC: 112 MG/DL (ref 65–99)
GLUCOSE SERPL-MCNC: 120 MG/DL (ref 65–99)
GLUCOSE SERPL-MCNC: 126 MG/DL (ref 65–99)
GLUCOSE SERPL-MCNC: 126 MG/DL (ref 65–99)
GLUCOSE SERPL-MCNC: 159 MG/DL (ref 65–99)
GLUCOSE SERPL-MCNC: 77 MG/DL (ref 65–99)
GLUCOSE SERPL-MCNC: 81 MG/DL (ref 65–99)
GLUCOSE SERPL-MCNC: 85 MG/DL (ref 65–99)
GLUCOSE SERPL-MCNC: 92 MG/DL (ref 65–99)
GLUCOSE UR STRIP.AUTO-MCNC: NEGATIVE MG/DL
GLUCOSE UR STRIP.AUTO-MCNC: NEGATIVE MG/DL
GRAN CASTS #/AREA URNS LPF: ABNORMAL /LPF
HAV IGM SERPL QL IA: NORMAL
HBA1C MFR BLD: 6.4 % (ref 4–5.6)
HBV CORE IGM SER QL: NORMAL
HBV SURFACE AG SER QL: NORMAL
HCT VFR BLD AUTO: 37.3 % (ref 37–47)
HCT VFR BLD AUTO: 39.4 % (ref 37–47)
HCT VFR BLD AUTO: 40.4 % (ref 37–47)
HCT VFR BLD AUTO: 41 % (ref 37–47)
HCT VFR BLD AUTO: 42 % (ref 37–47)
HCT VFR BLD AUTO: 42.1 % (ref 37–47)
HCT VFR BLD AUTO: 44.4 % (ref 37–47)
HCV AB SER QL: NORMAL
HGB BLD-MCNC: 12 G/DL (ref 12–16)
HGB BLD-MCNC: 13.1 G/DL (ref 12–16)
HGB BLD-MCNC: 13.2 G/DL (ref 12–16)
HGB BLD-MCNC: 13.4 G/DL (ref 12–16)
HGB BLD-MCNC: 13.5 G/DL (ref 12–16)
HGB BLD-MCNC: 13.7 G/DL (ref 12–16)
HGB BLD-MCNC: 14 G/DL (ref 12–16)
HGB BLD-MCNC: 14 G/DL (ref 12–16)
HGB BLD-MCNC: 14.2 G/DL (ref 12–16)
HYALINE CASTS #/AREA URNS LPF: ABNORMAL /LPF
HYALINE CASTS #/AREA URNS LPF: ABNORMAL /LPF
IMM GRANULOCYTES # BLD AUTO: 0.03 K/UL (ref 0–0.11)
IMM GRANULOCYTES # BLD AUTO: 0.03 K/UL (ref 0–0.11)
IMM GRANULOCYTES # BLD AUTO: 0.06 K/UL (ref 0–0.11)
IMM GRANULOCYTES # BLD AUTO: 0.08 K/UL (ref 0–0.11)
IMM GRANULOCYTES # BLD AUTO: 0.11 K/UL (ref 0–0.11)
IMM GRANULOCYTES # BLD AUTO: 0.16 K/UL (ref 0–0.11)
IMM GRANULOCYTES # BLD AUTO: 0.24 K/UL (ref 0–0.11)
IMM GRANULOCYTES NFR BLD AUTO: 0.4 % (ref 0–0.9)
IMM GRANULOCYTES NFR BLD AUTO: 0.5 % (ref 0–0.9)
IMM GRANULOCYTES NFR BLD AUTO: 0.7 % (ref 0–0.9)
IMM GRANULOCYTES NFR BLD AUTO: 0.8 % (ref 0–0.9)
IMM GRANULOCYTES NFR BLD AUTO: 0.8 % (ref 0–0.9)
IMM GRANULOCYTES NFR BLD AUTO: 1.3 % (ref 0–0.9)
IMM GRANULOCYTES NFR BLD AUTO: 1.8 % (ref 0–0.9)
KETONES UR STRIP.AUTO-MCNC: ABNORMAL MG/DL
KETONES UR STRIP.AUTO-MCNC: ABNORMAL MG/DL
LACTATE BLD-SCNC: 1.6 MMOL/L (ref 0.5–2)
LACTATE BLD-SCNC: 1.9 MMOL/L (ref 0.5–2)
LEUKOCYTE ESTERASE UR QL STRIP.AUTO: ABNORMAL
LEUKOCYTE ESTERASE UR QL STRIP.AUTO: ABNORMAL
LV EJECT FRACT  99904: 50
LV EJECT FRACT  99904: 70
LV EJECT FRACT MOD 2C 99903: 46.83
LV EJECT FRACT MOD 2C 99903: 57.97
LV EJECT FRACT MOD 4C 99902: 50.04
LV EJECT FRACT MOD 4C 99902: 50.1
LV EJECT FRACT MOD BP 99901: 48.13
LV EJECT FRACT MOD BP 99901: 53.18
LYMPHOCYTES # BLD AUTO: 0.16 K/UL (ref 1–4.8)
LYMPHOCYTES # BLD AUTO: 0.17 K/UL (ref 1–4.8)
LYMPHOCYTES # BLD AUTO: 0.23 K/UL (ref 1–4.8)
LYMPHOCYTES # BLD AUTO: 0.28 K/UL (ref 1–4.8)
LYMPHOCYTES # BLD AUTO: 0.32 K/UL (ref 1–4.8)
LYMPHOCYTES # BLD AUTO: 0.37 K/UL (ref 1–4.8)
LYMPHOCYTES # BLD AUTO: 0.48 K/UL (ref 1–4.8)
LYMPHOCYTES # BLD AUTO: 1.36 K/UL (ref 1–4.8)
LYMPHOCYTES NFR BLD: 1.2 % (ref 22–41)
LYMPHOCYTES NFR BLD: 1.4 % (ref 22–41)
LYMPHOCYTES NFR BLD: 1.7 % (ref 22–41)
LYMPHOCYTES NFR BLD: 18.7 % (ref 22–41)
LYMPHOCYTES NFR BLD: 2.6 % (ref 22–41)
LYMPHOCYTES NFR BLD: 4.3 % (ref 22–41)
LYMPHOCYTES NFR BLD: 4.7 % (ref 22–41)
LYMPHOCYTES NFR BLD: 5.4 % (ref 22–41)
M TB IFN-G BLD-IMP: ABNORMAL
M TB IFN-G CD4+ BCKGRND COR BLD-ACNC: 0 IU/ML
MAGNESIUM SERPL-MCNC: 1.9 MG/DL (ref 1.5–2.5)
MAGNESIUM SERPL-MCNC: 2.8 MG/DL (ref 1.5–2.5)
MAGNESIUM SERPL-MCNC: 2.8 MG/DL (ref 1.5–2.5)
MANUAL DIFF BLD: NORMAL
MCH RBC QN AUTO: 29.4 PG (ref 27–33)
MCH RBC QN AUTO: 29.6 PG (ref 27–33)
MCH RBC QN AUTO: 29.8 PG (ref 27–33)
MCH RBC QN AUTO: 29.8 PG (ref 27–33)
MCH RBC QN AUTO: 29.9 PG (ref 27–33)
MCH RBC QN AUTO: 30.1 PG (ref 27–33)
MCH RBC QN AUTO: 30.1 PG (ref 27–33)
MCH RBC QN AUTO: 30.2 PG (ref 27–33)
MCH RBC QN AUTO: 30.3 PG (ref 27–33)
MCHC RBC AUTO-ENTMCNC: 32 G/DL (ref 33.6–35)
MCHC RBC AUTO-ENTMCNC: 32.2 G/DL (ref 33.6–35)
MCHC RBC AUTO-ENTMCNC: 32.6 G/DL (ref 33.6–35)
MCHC RBC AUTO-ENTMCNC: 32.7 G/DL (ref 33.6–35)
MCHC RBC AUTO-ENTMCNC: 32.7 G/DL (ref 33.6–35)
MCHC RBC AUTO-ENTMCNC: 32.9 G/DL (ref 33.6–35)
MCHC RBC AUTO-ENTMCNC: 33.2 G/DL (ref 33.6–35)
MCHC RBC AUTO-ENTMCNC: 33.3 G/DL (ref 33.6–35)
MCHC RBC AUTO-ENTMCNC: 34.1 G/DL (ref 33.6–35)
MCV RBC AUTO: 87.2 FL (ref 81.4–97.8)
MCV RBC AUTO: 89.8 FL (ref 81.4–97.8)
MCV RBC AUTO: 90.8 FL (ref 81.4–97.8)
MCV RBC AUTO: 91.3 FL (ref 81.4–97.8)
MCV RBC AUTO: 91.4 FL (ref 81.4–97.8)
MCV RBC AUTO: 91.5 FL (ref 81.4–97.8)
MCV RBC AUTO: 92.2 FL (ref 81.4–97.8)
MCV RBC AUTO: 92.5 FL (ref 81.4–97.8)
MCV RBC AUTO: 92.8 FL (ref 81.4–97.8)
MICRO URNS: ABNORMAL
MICRO URNS: ABNORMAL
MITOGEN IGNF BCKGRD COR BLD-ACNC: 0.01 IU/ML
MONOCYTES # BLD AUTO: 0.19 K/UL (ref 0–0.85)
MONOCYTES # BLD AUTO: 0.22 K/UL (ref 0–0.85)
MONOCYTES # BLD AUTO: 0.31 K/UL (ref 0–0.85)
MONOCYTES # BLD AUTO: 0.36 K/UL (ref 0–0.85)
MONOCYTES # BLD AUTO: 0.44 K/UL (ref 0–0.85)
MONOCYTES # BLD AUTO: 0.57 K/UL (ref 0–0.85)
MONOCYTES # BLD AUTO: 0.64 K/UL (ref 0–0.85)
MONOCYTES # BLD AUTO: 0.77 K/UL (ref 0–0.85)
MONOCYTES NFR BLD AUTO: 1.6 % (ref 0–13.4)
MONOCYTES NFR BLD AUTO: 1.8 % (ref 0–13.4)
MONOCYTES NFR BLD AUTO: 13 % (ref 0–13.4)
MONOCYTES NFR BLD AUTO: 2.3 % (ref 0–13.4)
MONOCYTES NFR BLD AUTO: 3 % (ref 0–13.4)
MONOCYTES NFR BLD AUTO: 4 % (ref 0–13.4)
MONOCYTES NFR BLD AUTO: 7.8 % (ref 0–13.4)
MONOCYTES NFR BLD AUTO: 8.2 % (ref 0–13.4)
MORPHOLOGY BLD-IMP: NORMAL
NEUTROPHILS # BLD AUTO: 10.09 K/UL (ref 2–7.15)
NEUTROPHILS # BLD AUTO: 10.32 K/UL (ref 2–7.15)
NEUTROPHILS # BLD AUTO: 11.19 K/UL (ref 2–7.15)
NEUTROPHILS # BLD AUTO: 12.66 K/UL (ref 2–7.15)
NEUTROPHILS # BLD AUTO: 13.01 K/UL (ref 2–7.15)
NEUTROPHILS # BLD AUTO: 4.8 K/UL (ref 2–7.15)
NEUTROPHILS # BLD AUTO: 5.21 K/UL (ref 2–7.15)
NEUTROPHILS # BLD AUTO: 6.72 K/UL (ref 2–7.15)
NEUTROPHILS NFR BLD: 71.7 % (ref 44–72)
NEUTROPHILS NFR BLD: 80.9 % (ref 44–72)
NEUTROPHILS NFR BLD: 86 % (ref 44–72)
NEUTROPHILS NFR BLD: 90.8 % (ref 44–72)
NEUTROPHILS NFR BLD: 93.9 % (ref 44–72)
NEUTROPHILS NFR BLD: 94.8 % (ref 44–72)
NEUTROPHILS NFR BLD: 95.2 % (ref 44–72)
NEUTROPHILS NFR BLD: 95.6 % (ref 44–72)
NITRITE UR QL STRIP.AUTO: NEGATIVE
NITRITE UR QL STRIP.AUTO: NEGATIVE
NRBC # BLD AUTO: 0 K/UL
NRBC BLD-RTO: 0 /100 WBC
NT-PROBNP SERPL IA-MCNC: 706 PG/ML (ref 0–125)
OVALOCYTES BLD QL SMEAR: NORMAL
PH UR STRIP.AUTO: 5 [PH] (ref 5–8)
PH UR STRIP.AUTO: 5.5 [PH] (ref 5–8)
PHOSPHATE SERPL-MCNC: 3.4 MG/DL (ref 2.5–4.5)
PHOSPHATE SERPL-MCNC: 4.6 MG/DL (ref 2.5–4.5)
PLATELET # BLD AUTO: 100 K/UL (ref 164–446)
PLATELET # BLD AUTO: 101 K/UL (ref 164–446)
PLATELET # BLD AUTO: 111 K/UL (ref 164–446)
PLATELET # BLD AUTO: 123 K/UL (ref 164–446)
PLATELET # BLD AUTO: 135 K/UL (ref 164–446)
PLATELET # BLD AUTO: 145 K/UL (ref 164–446)
PLATELET # BLD AUTO: 82 K/UL (ref 164–446)
PLATELET # BLD AUTO: 96 K/UL (ref 164–446)
PLATELET BLD QL SMEAR: NORMAL
PMV BLD AUTO: 12.7 FL (ref 9–12.9)
PMV BLD AUTO: 12.7 FL (ref 9–12.9)
PMV BLD AUTO: 13.2 FL (ref 9–12.9)
PMV BLD AUTO: 13.2 FL (ref 9–12.9)
PMV BLD AUTO: 13.4 FL (ref 9–12.9)
PMV BLD AUTO: 13.5 FL (ref 9–12.9)
PMV BLD AUTO: 14.2 FL (ref 9–12.9)
PMV BLD AUTO: 14.4 FL (ref 9–12.9)
POIKILOCYTOSIS BLD QL SMEAR: NORMAL
POTASSIUM SERPL-SCNC: 4 MMOL/L (ref 3.6–5.5)
POTASSIUM SERPL-SCNC: 4.1 MMOL/L (ref 3.6–5.5)
POTASSIUM SERPL-SCNC: 4.2 MMOL/L (ref 3.6–5.5)
POTASSIUM SERPL-SCNC: 4.2 MMOL/L (ref 3.6–5.5)
POTASSIUM SERPL-SCNC: 4.4 MMOL/L (ref 3.6–5.5)
POTASSIUM SERPL-SCNC: 4.7 MMOL/L (ref 3.6–5.5)
POTASSIUM SERPL-SCNC: 4.7 MMOL/L (ref 3.6–5.5)
POTASSIUM SERPL-SCNC: 5 MMOL/L (ref 3.6–5.5)
POTASSIUM SERPL-SCNC: 5.2 MMOL/L (ref 3.6–5.5)
PREALB SERPL-MCNC: 6 MG/DL (ref 18–38)
PREALB SERPL-MCNC: 6.8 MG/DL (ref 18–38)
PROCALCITONIN SERPL-MCNC: 0.35 NG/ML
PROCALCITONIN SERPL-MCNC: 0.52 NG/ML
PROCALCITONIN SERPL-MCNC: 1.78 NG/ML
PROT SERPL-MCNC: 5.7 G/DL (ref 6–8.2)
PROT SERPL-MCNC: 5.8 G/DL (ref 6–8.2)
PROT SERPL-MCNC: 6 G/DL (ref 6–8.2)
PROT SERPL-MCNC: 6.3 G/DL (ref 6–8.2)
PROT SERPL-MCNC: 6.5 G/DL (ref 6–8.2)
PROT SERPL-MCNC: 6.7 G/DL (ref 6–8.2)
PROT UR QL STRIP: 100 MG/DL
PROT UR QL STRIP: 30 MG/DL
QFT TB2 - NIL TBQ2: -0.01 IU/ML
RBC # BLD AUTO: 4.08 M/UL (ref 4.2–5.4)
RBC # BLD AUTO: 4.34 M/UL (ref 4.2–5.4)
RBC # BLD AUTO: 4.38 M/UL (ref 4.2–5.4)
RBC # BLD AUTO: 4.42 M/UL (ref 4.2–5.4)
RBC # BLD AUTO: 4.49 M/UL (ref 4.2–5.4)
RBC # BLD AUTO: 4.59 M/UL (ref 4.2–5.4)
RBC # BLD AUTO: 4.69 M/UL (ref 4.2–5.4)
RBC # BLD AUTO: 4.7 M/UL (ref 4.2–5.4)
RBC # BLD AUTO: 4.8 M/UL (ref 4.2–5.4)
RBC # URNS HPF: ABNORMAL /HPF
RBC # URNS HPF: ABNORMAL /HPF
RBC BLD AUTO: PRESENT
RBC UR QL AUTO: ABNORMAL
RBC UR QL AUTO: NEGATIVE
RENAL EPI CELLS #/AREA URNS HPF: ABNORMAL /HPF
RSV RNA SPEC QL NAA+PROBE: NEGATIVE
SARS-COV-2 RNA RESP QL NAA+PROBE: DETECTED
SIGNIFICANT IND 70042: NORMAL
SITE SITE: NORMAL
SODIUM SERPL-SCNC: 131 MMOL/L (ref 135–145)
SODIUM SERPL-SCNC: 133 MMOL/L (ref 135–145)
SODIUM SERPL-SCNC: 133 MMOL/L (ref 135–145)
SODIUM SERPL-SCNC: 135 MMOL/L (ref 135–145)
SODIUM SERPL-SCNC: 137 MMOL/L (ref 135–145)
SODIUM SERPL-SCNC: 139 MMOL/L (ref 135–145)
SODIUM SERPL-SCNC: 141 MMOL/L (ref 135–145)
SODIUM SERPL-SCNC: 141 MMOL/L (ref 135–145)
SODIUM SERPL-SCNC: 145 MMOL/L (ref 135–145)
SODIUM SERPL-SCNC: 148 MMOL/L (ref 135–145)
SOURCE SOURCE: NORMAL
SP GR UR STRIP.AUTO: 1.02
SP GR UR STRIP.AUTO: 1.02
SPECIMEN SOURCE: ABNORMAL
TROPONIN T SERPL-MCNC: 18 NG/L (ref 6–19)
TSH SERPL DL<=0.005 MIU/L-ACNC: 0.39 UIU/ML (ref 0.38–5.33)
UROBILINOGEN UR STRIP.AUTO-MCNC: 0.2 MG/DL
UROBILINOGEN UR STRIP.AUTO-MCNC: 0.2 MG/DL
VIT B12 SERPL-MCNC: >4000 PG/ML (ref 211–911)
WBC # BLD AUTO: 10.8 K/UL (ref 4.8–10.8)
WBC # BLD AUTO: 11.1 K/UL (ref 4.8–10.8)
WBC # BLD AUTO: 11.9 K/UL (ref 4.8–10.8)
WBC # BLD AUTO: 13.4 K/UL (ref 4.8–10.8)
WBC # BLD AUTO: 13.7 K/UL (ref 4.8–10.8)
WBC # BLD AUTO: 5.3 K/UL (ref 4.8–10.8)
WBC # BLD AUTO: 5.9 K/UL (ref 4.8–10.8)
WBC # BLD AUTO: 7.3 K/UL (ref 4.8–10.8)
WBC # BLD AUTO: 7.8 K/UL (ref 4.8–10.8)
WBC #/AREA URNS HPF: ABNORMAL /HPF
WBC #/AREA URNS HPF: ABNORMAL /HPF

## 2021-01-01 PROCEDURE — 700102 HCHG RX REV CODE 250 W/ 637 OVERRIDE(OP): Performed by: STUDENT IN AN ORGANIZED HEALTH CARE EDUCATION/TRAINING PROGRAM

## 2021-01-01 PROCEDURE — A9270 NON-COVERED ITEM OR SERVICE: HCPCS | Performed by: STUDENT IN AN ORGANIZED HEALTH CARE EDUCATION/TRAINING PROGRAM

## 2021-01-01 PROCEDURE — 80048 BASIC METABOLIC PNL TOTAL CA: CPT

## 2021-01-01 PROCEDURE — 80053 COMPREHEN METABOLIC PANEL: CPT

## 2021-01-01 PROCEDURE — 87040 BLOOD CULTURE FOR BACTERIA: CPT | Mod: 91

## 2021-01-01 PROCEDURE — 85027 COMPLETE CBC AUTOMATED: CPT

## 2021-01-01 PROCEDURE — 71045 X-RAY EXAM CHEST 1 VIEW: CPT

## 2021-01-01 PROCEDURE — 87040 BLOOD CULTURE FOR BACTERIA: CPT

## 2021-01-01 PROCEDURE — 99233 SBSQ HOSP IP/OBS HIGH 50: CPT | Performed by: STUDENT IN AN ORGANIZED HEALTH CARE EDUCATION/TRAINING PROGRAM

## 2021-01-01 PROCEDURE — 99285 EMERGENCY DEPT VISIT HI MDM: CPT

## 2021-01-01 PROCEDURE — 94760 N-INVAS EAR/PLS OXIMETRY 1: CPT

## 2021-01-01 PROCEDURE — 700111 HCHG RX REV CODE 636 W/ 250 OVERRIDE (IP): Performed by: INTERNAL MEDICINE

## 2021-01-01 PROCEDURE — 700102 HCHG RX REV CODE 250 W/ 637 OVERRIDE(OP): Performed by: INTERNAL MEDICINE

## 2021-01-01 PROCEDURE — 93005 ELECTROCARDIOGRAM TRACING: CPT | Performed by: STUDENT IN AN ORGANIZED HEALTH CARE EDUCATION/TRAINING PROGRAM

## 2021-01-01 PROCEDURE — 84100 ASSAY OF PHOSPHORUS: CPT

## 2021-01-01 PROCEDURE — 94640 AIRWAY INHALATION TREATMENT: CPT

## 2021-01-01 PROCEDURE — 93005 ELECTROCARDIOGRAM TRACING: CPT | Performed by: INTERNAL MEDICINE

## 2021-01-01 PROCEDURE — G0378 HOSPITAL OBSERVATION PER HR: HCPCS

## 2021-01-01 PROCEDURE — 84134 ASSAY OF PREALBUMIN: CPT

## 2021-01-01 PROCEDURE — 83880 ASSAY OF NATRIURETIC PEPTIDE: CPT

## 2021-01-01 PROCEDURE — 90715 TDAP VACCINE 7 YRS/> IM: CPT | Performed by: EMERGENCY MEDICINE

## 2021-01-01 PROCEDURE — 36415 COLL VENOUS BLD VENIPUNCTURE: CPT

## 2021-01-01 PROCEDURE — 93306 TTE W/DOPPLER COMPLETE: CPT | Mod: 26 | Performed by: INTERNAL MEDICINE

## 2021-01-01 PROCEDURE — 81001 URINALYSIS AUTO W/SCOPE: CPT

## 2021-01-01 PROCEDURE — 770001 HCHG ROOM/CARE - MED/SURG/GYN PRIV*

## 2021-01-01 PROCEDURE — 85379 FIBRIN DEGRADATION QUANT: CPT

## 2021-01-01 PROCEDURE — 83735 ASSAY OF MAGNESIUM: CPT

## 2021-01-01 PROCEDURE — 93010 ELECTROCARDIOGRAM REPORT: CPT | Performed by: INTERNAL MEDICINE

## 2021-01-01 PROCEDURE — 85025 COMPLETE CBC W/AUTO DIFF WBC: CPT

## 2021-01-01 PROCEDURE — 82306 VITAMIN D 25 HYDROXY: CPT

## 2021-01-01 PROCEDURE — 86140 C-REACTIVE PROTEIN: CPT

## 2021-01-01 PROCEDURE — 700111 HCHG RX REV CODE 636 W/ 250 OVERRIDE (IP): Performed by: STUDENT IN AN ORGANIZED HEALTH CARE EDUCATION/TRAINING PROGRAM

## 2021-01-01 PROCEDURE — A9270 NON-COVERED ITEM OR SERVICE: HCPCS

## 2021-01-01 PROCEDURE — 96374 THER/PROPH/DIAG INJ IV PUSH: CPT

## 2021-01-01 PROCEDURE — 84145 PROCALCITONIN (PCT): CPT

## 2021-01-01 PROCEDURE — 71275 CT ANGIOGRAPHY CHEST: CPT | Mod: ME

## 2021-01-01 PROCEDURE — 99233 SBSQ HOSP IP/OBS HIGH 50: CPT | Mod: 25 | Performed by: INTERNAL MEDICINE

## 2021-01-01 PROCEDURE — 85652 RBC SED RATE AUTOMATED: CPT

## 2021-01-01 PROCEDURE — 99211 OFF/OP EST MAY X REQ PHY/QHP: CPT | Performed by: INTERNAL MEDICINE

## 2021-01-01 PROCEDURE — 93005 ELECTROCARDIOGRAM TRACING: CPT | Performed by: EMERGENCY MEDICINE

## 2021-01-01 PROCEDURE — 99220 PR INITIAL OBSERVATION CARE,LEVL III: CPT | Performed by: STUDENT IN AN ORGANIZED HEALTH CARE EDUCATION/TRAINING PROGRAM

## 2021-01-01 PROCEDURE — 700105 HCHG RX REV CODE 258: Performed by: INTERNAL MEDICINE

## 2021-01-01 PROCEDURE — 99214 OFFICE O/P EST MOD 30 MIN: CPT | Performed by: NURSE PRACTITIONER

## 2021-01-01 PROCEDURE — 70551 MRI BRAIN STEM W/O DYE: CPT | Mod: MG

## 2021-01-01 PROCEDURE — 700102 HCHG RX REV CODE 250 W/ 637 OVERRIDE(OP)

## 2021-01-01 PROCEDURE — 86480 TB TEST CELL IMMUN MEASURE: CPT

## 2021-01-01 PROCEDURE — 700105 HCHG RX REV CODE 258: Performed by: STUDENT IN AN ORGANIZED HEALTH CARE EDUCATION/TRAINING PROGRAM

## 2021-01-01 PROCEDURE — 93308 TTE F-UP OR LMTD: CPT | Mod: 26 | Performed by: INTERNAL MEDICINE

## 2021-01-01 PROCEDURE — 93005 ELECTROCARDIOGRAM TRACING: CPT

## 2021-01-01 PROCEDURE — A9270 NON-COVERED ITEM OR SERVICE: HCPCS | Performed by: INTERNAL MEDICINE

## 2021-01-01 PROCEDURE — 85014 HEMATOCRIT: CPT

## 2021-01-01 PROCEDURE — 700101 HCHG RX REV CODE 250: Performed by: INTERNAL MEDICINE

## 2021-01-01 PROCEDURE — 93325 DOPPLER ECHO COLOR FLOW MAPG: CPT | Mod: 26 | Performed by: INTERNAL MEDICINE

## 2021-01-01 PROCEDURE — 84484 ASSAY OF TROPONIN QUANT: CPT

## 2021-01-01 PROCEDURE — 85048 AUTOMATED LEUKOCYTE COUNT: CPT

## 2021-01-01 PROCEDURE — 85041 AUTOMATED RBC COUNT: CPT

## 2021-01-01 PROCEDURE — 770020 HCHG ROOM/CARE - TELE (206)

## 2021-01-01 PROCEDURE — G0493 RN CARE EA 15 MIN HH/HOSPICE: HCPCS

## 2021-01-01 PROCEDURE — 85007 BL SMEAR W/DIFF WBC COUNT: CPT

## 2021-01-01 PROCEDURE — 93325 DOPPLER ECHO COLOR FLOW MAPG: CPT

## 2021-01-01 PROCEDURE — G0180 MD CERTIFICATION HHA PATIENT: HCPCS | Performed by: NURSE PRACTITIONER

## 2021-01-01 PROCEDURE — 97161 PT EVAL LOW COMPLEX 20 MIN: CPT

## 2021-01-01 PROCEDURE — 99233 SBSQ HOSP IP/OBS HIGH 50: CPT | Performed by: INTERNAL MEDICINE

## 2021-01-01 PROCEDURE — 82607 VITAMIN B-12: CPT

## 2021-01-01 PROCEDURE — 97535 SELF CARE MNGMENT TRAINING: CPT

## 2021-01-01 PROCEDURE — 97530 THERAPEUTIC ACTIVITIES: CPT

## 2021-01-01 PROCEDURE — 700111 HCHG RX REV CODE 636 W/ 250 OVERRIDE (IP): Performed by: EMERGENCY MEDICINE

## 2021-01-01 PROCEDURE — 0241U HCHG SARS-COV-2 COVID-19 NFCT DS RESP RNA 4 TRGT MIC: CPT

## 2021-01-01 PROCEDURE — 700105 HCHG RX REV CODE 258: Performed by: EMERGENCY MEDICINE

## 2021-01-01 PROCEDURE — 83605 ASSAY OF LACTIC ACID: CPT

## 2021-01-01 PROCEDURE — 97535 SELF CARE MNGMENT TRAINING: CPT | Mod: CO

## 2021-01-01 PROCEDURE — 83036 HEMOGLOBIN GLYCOSYLATED A1C: CPT

## 2021-01-01 PROCEDURE — 97165 OT EVAL LOW COMPLEX 30 MIN: CPT

## 2021-01-01 PROCEDURE — 99497 ADVNCD CARE PLAN 30 MIN: CPT | Performed by: INTERNAL MEDICINE

## 2021-01-01 PROCEDURE — 70450 CT HEAD/BRAIN W/O DYE: CPT | Mod: MG

## 2021-01-01 PROCEDURE — 99214 OFFICE O/P EST MOD 30 MIN: CPT | Mod: 25 | Performed by: INTERNAL MEDICINE

## 2021-01-01 PROCEDURE — 99291 CRITICAL CARE FIRST HOUR: CPT | Performed by: INTERNAL MEDICINE

## 2021-01-01 PROCEDURE — C9803 HOPD COVID-19 SPEC COLLECT: HCPCS | Performed by: EMERGENCY MEDICINE

## 2021-01-01 PROCEDURE — 665001 SOC-HOME HEALTH

## 2021-01-01 PROCEDURE — 99232 SBSQ HOSP IP/OBS MODERATE 35: CPT | Performed by: INTERNAL MEDICINE

## 2021-01-01 PROCEDURE — 99217 PR OBSERVATION CARE DISCHARGE: CPT | Performed by: STUDENT IN AN ORGANIZED HEALTH CARE EDUCATION/TRAINING PROGRAM

## 2021-01-01 PROCEDURE — 84443 ASSAY THYROID STIM HORMONE: CPT

## 2021-01-01 PROCEDURE — 99211 OFF/OP EST MAY X REQ PHY/QHP: CPT | Performed by: NURSE PRACTITIONER

## 2021-01-01 PROCEDURE — 700117 HCHG RX CONTRAST REV CODE 255: Performed by: STUDENT IN AN ORGANIZED HEALTH CARE EDUCATION/TRAINING PROGRAM

## 2021-01-01 PROCEDURE — 97166 OT EVAL MOD COMPLEX 45 MIN: CPT

## 2021-01-01 PROCEDURE — 96372 THER/PROPH/DIAG INJ SC/IM: CPT | Mod: XU

## 2021-01-01 PROCEDURE — 85018 HEMOGLOBIN: CPT

## 2021-01-01 PROCEDURE — 90471 IMMUNIZATION ADMIN: CPT

## 2021-01-01 PROCEDURE — 80074 ACUTE HEPATITIS PANEL: CPT

## 2021-01-01 PROCEDURE — 87086 URINE CULTURE/COLONY COUNT: CPT

## 2021-01-01 PROCEDURE — XW0G7M6 INTRODUCTION OF BARICITINIB INTO UPPER GI, VIA NATURAL OR ARTIFICIAL OPENING, NEW TECHNOLOGY GROUP 6: ICD-10-PCS | Performed by: INTERNAL MEDICINE

## 2021-01-01 PROCEDURE — 93321 DOPPLER ECHO F-UP/LMTD STD: CPT | Mod: 26 | Performed by: INTERNAL MEDICINE

## 2021-01-01 PROCEDURE — 94664 DEMO&/EVAL PT USE INHALER: CPT

## 2021-01-01 PROCEDURE — 99223 1ST HOSP IP/OBS HIGH 75: CPT | Mod: AI | Performed by: STUDENT IN AN ORGANIZED HEALTH CARE EDUCATION/TRAINING PROGRAM

## 2021-01-01 PROCEDURE — 99406 BEHAV CHNG SMOKING 3-10 MIN: CPT

## 2021-01-01 PROCEDURE — 99406 BEHAV CHNG SMOKING 3-10 MIN: CPT | Performed by: INTERNAL MEDICINE

## 2021-01-01 PROCEDURE — 93306 TTE W/DOPPLER COMPLETE: CPT

## 2021-01-01 RX ORDER — ONDANSETRON 4 MG/1
4 TABLET, ORALLY DISINTEGRATING ORAL EVERY 4 HOURS PRN
Status: DISCONTINUED | OUTPATIENT
Start: 2021-01-01 | End: 2021-01-01 | Stop reason: HOSPADM

## 2021-01-01 RX ORDER — DEXAMETHASONE 4 MG/1
6 TABLET ORAL DAILY
Status: DISPENSED | OUTPATIENT
Start: 2021-01-01 | End: 2021-01-01

## 2021-01-01 RX ORDER — MORPHINE SULFATE 10 MG/ML
5 INJECTION, SOLUTION INTRAMUSCULAR; INTRAVENOUS
Status: DISCONTINUED | OUTPATIENT
Start: 2021-01-01 | End: 2021-09-25 | Stop reason: HOSPADM

## 2021-01-01 RX ORDER — DILTIAZEM HYDROCHLORIDE 5 MG/ML
10 INJECTION INTRAVENOUS ONCE
Status: COMPLETED | OUTPATIENT
Start: 2021-01-01 | End: 2021-01-01

## 2021-01-01 RX ORDER — DIGOXIN 0.25 MG/ML
500 INJECTION INTRAMUSCULAR; INTRAVENOUS ONCE
Status: DISCONTINUED | OUTPATIENT
Start: 2021-01-01 | End: 2021-01-01

## 2021-01-01 RX ORDER — SPIRONOLACTONE 25 MG/1
12.5 TABLET ORAL DAILY
Status: DISCONTINUED | OUTPATIENT
Start: 2021-01-01 | End: 2021-01-01 | Stop reason: HOSPADM

## 2021-01-01 RX ORDER — OXYCODONE HYDROCHLORIDE 5 MG/1
2.5 TABLET ORAL
Status: DISCONTINUED | OUTPATIENT
Start: 2021-01-01 | End: 2021-01-01 | Stop reason: HOSPADM

## 2021-01-01 RX ORDER — LISINOPRIL 5 MG/1
5 TABLET ORAL 2 TIMES DAILY
Qty: 100 TABLET | Refills: 3 | Status: SHIPPED | OUTPATIENT
Start: 2021-01-01 | End: 2021-01-01

## 2021-01-01 RX ORDER — SODIUM CHLORIDE 9 MG/ML
INJECTION, SOLUTION INTRAVENOUS CONTINUOUS
Status: ACTIVE | OUTPATIENT
Start: 2021-01-01 | End: 2021-01-01

## 2021-01-01 RX ORDER — ATORVASTATIN CALCIUM 40 MG/1
40 TABLET, FILM COATED ORAL NIGHTLY
Status: DISCONTINUED | OUTPATIENT
Start: 2021-01-01 | End: 2021-01-01 | Stop reason: HOSPADM

## 2021-01-01 RX ORDER — DEXAMETHASONE SODIUM PHOSPHATE 4 MG/ML
6 INJECTION, SOLUTION INTRA-ARTICULAR; INTRALESIONAL; INTRAMUSCULAR; INTRAVENOUS; SOFT TISSUE ONCE
Status: COMPLETED | OUTPATIENT
Start: 2021-01-01 | End: 2021-01-01

## 2021-01-01 RX ORDER — OXYCODONE HYDROCHLORIDE 5 MG/1
5 TABLET ORAL
Status: DISCONTINUED | OUTPATIENT
Start: 2021-01-01 | End: 2021-01-01 | Stop reason: HOSPADM

## 2021-01-01 RX ORDER — BISACODYL 10 MG
10 SUPPOSITORY, RECTAL RECTAL
Status: DISCONTINUED | OUTPATIENT
Start: 2021-01-01 | End: 2021-01-01 | Stop reason: HOSPADM

## 2021-01-01 RX ORDER — HYDROMORPHONE HYDROCHLORIDE 1 MG/ML
0.25 INJECTION, SOLUTION INTRAMUSCULAR; INTRAVENOUS; SUBCUTANEOUS
Status: DISCONTINUED | OUTPATIENT
Start: 2021-01-01 | End: 2021-01-01 | Stop reason: HOSPADM

## 2021-01-01 RX ORDER — SPIRONOLACTONE 25 MG/1
12.5 TABLET ORAL DAILY
Qty: 45 TABLET | Refills: 1 | Status: SHIPPED | OUTPATIENT
Start: 2021-01-01

## 2021-01-01 RX ORDER — LORAZEPAM 2 MG/ML
0.5 INJECTION INTRAMUSCULAR EVERY 4 HOURS PRN
Status: DISCONTINUED | OUTPATIENT
Start: 2021-01-01 | End: 2021-01-01

## 2021-01-01 RX ORDER — MAGNESIUM SULFATE HEPTAHYDRATE 40 MG/ML
2 INJECTION, SOLUTION INTRAVENOUS ONCE
Status: COMPLETED | OUTPATIENT
Start: 2021-01-01 | End: 2021-01-01

## 2021-01-01 RX ORDER — LORAZEPAM 2 MG/ML
1 INJECTION INTRAMUSCULAR
Status: DISCONTINUED | OUTPATIENT
Start: 2021-01-01 | End: 2021-09-25 | Stop reason: HOSPADM

## 2021-01-01 RX ORDER — ENALAPRILAT 1.25 MG/ML
1.25 INJECTION INTRAVENOUS EVERY 6 HOURS PRN
Status: DISCONTINUED | OUTPATIENT
Start: 2021-01-01 | End: 2021-01-01 | Stop reason: HOSPADM

## 2021-01-01 RX ORDER — AZITHROMYCIN 250 MG/1
500 TABLET, FILM COATED ORAL DAILY
Status: DISCONTINUED | OUTPATIENT
Start: 2021-01-01 | End: 2021-01-01

## 2021-01-01 RX ORDER — LISINOPRIL 5 MG/1
5 TABLET ORAL 2 TIMES DAILY
Qty: 60 TABLET | Refills: 4 | Status: SHIPPED | OUTPATIENT
Start: 2021-01-01 | End: 2021-01-01 | Stop reason: SDUPTHER

## 2021-01-01 RX ORDER — ATORVASTATIN CALCIUM 40 MG/1
40 TABLET, FILM COATED ORAL DAILY
Status: DISCONTINUED | OUTPATIENT
Start: 2021-01-01 | End: 2021-01-01

## 2021-01-01 RX ORDER — ACETAMINOPHEN 325 MG/1
650 TABLET ORAL EVERY 6 HOURS PRN
Status: DISCONTINUED | OUTPATIENT
Start: 2021-01-01 | End: 2021-01-01 | Stop reason: HOSPADM

## 2021-01-01 RX ORDER — POLYETHYLENE GLYCOL 3350 17 G/17G
1 POWDER, FOR SOLUTION ORAL
Status: DISCONTINUED | OUTPATIENT
Start: 2021-01-01 | End: 2021-01-01 | Stop reason: HOSPADM

## 2021-01-01 RX ORDER — ATORVASTATIN CALCIUM 40 MG/1
40 TABLET, FILM COATED ORAL
Qty: 100 TABLET | Refills: 3 | OUTPATIENT
Start: 2021-01-01

## 2021-01-01 RX ORDER — SODIUM CHLORIDE 9 MG/ML
1000 INJECTION, SOLUTION INTRAVENOUS ONCE
Status: COMPLETED | OUTPATIENT
Start: 2021-01-01 | End: 2021-01-01

## 2021-01-01 RX ORDER — FUROSEMIDE 10 MG/ML
40 INJECTION INTRAMUSCULAR; INTRAVENOUS EVERY 8 HOURS
Status: DISCONTINUED | OUTPATIENT
Start: 2021-01-01 | End: 2021-01-01

## 2021-01-01 RX ORDER — ACETAMINOPHEN 325 MG/1
650 TABLET ORAL EVERY 4 HOURS PRN
Status: DISCONTINUED | OUTPATIENT
Start: 2021-01-01 | End: 2021-01-01

## 2021-01-01 RX ORDER — SPIRONOLACTONE 25 MG/1
25 TABLET ORAL DAILY
Qty: 100 TABLET | Refills: 1 | Status: SHIPPED | OUTPATIENT
Start: 2021-01-01 | End: 2021-01-01

## 2021-01-01 RX ORDER — LABETALOL HYDROCHLORIDE 5 MG/ML
10 INJECTION, SOLUTION INTRAVENOUS EVERY 4 HOURS PRN
Status: DISCONTINUED | OUTPATIENT
Start: 2021-01-01 | End: 2021-01-01 | Stop reason: HOSPADM

## 2021-01-01 RX ORDER — LISINOPRIL 10 MG/1
TABLET ORAL
COMMUNITY
Start: 2021-01-01 | End: 2021-01-01

## 2021-01-01 RX ORDER — LORAZEPAM 2 MG/ML
1 CONCENTRATE ORAL
Status: DISCONTINUED | OUTPATIENT
Start: 2021-01-01 | End: 2021-09-25 | Stop reason: HOSPADM

## 2021-01-01 RX ORDER — ATORVASTATIN CALCIUM 40 MG/1
TABLET, FILM COATED ORAL
Qty: 90 TABLET | Refills: 3 | Status: SHIPPED | OUTPATIENT
Start: 2021-01-01

## 2021-01-01 RX ORDER — DEXAMETHASONE SODIUM PHOSPHATE 4 MG/ML
6 INJECTION, SOLUTION INTRA-ARTICULAR; INTRALESIONAL; INTRAMUSCULAR; INTRAVENOUS; SOFT TISSUE DAILY
Status: DISCONTINUED | OUTPATIENT
Start: 2021-01-01 | End: 2021-01-01

## 2021-01-01 RX ORDER — LISINOPRIL 5 MG/1
5 TABLET ORAL
COMMUNITY
Start: 2021-01-01 | End: 2021-01-01

## 2021-01-01 RX ORDER — ASPIRIN 81 MG/1
81 TABLET, CHEWABLE ORAL DAILY
Status: DISCONTINUED | OUTPATIENT
Start: 2021-01-01 | End: 2021-01-01

## 2021-01-01 RX ORDER — LISINOPRIL 10 MG/1
10 TABLET ORAL DAILY
Qty: 100 TAB | Refills: 1 | Status: SHIPPED | OUTPATIENT
Start: 2021-01-01 | End: 2021-01-01

## 2021-01-01 RX ORDER — DIGOXIN 0.25 MG/ML
250 INJECTION INTRAMUSCULAR; INTRAVENOUS ONCE
Status: COMPLETED | OUTPATIENT
Start: 2021-01-01 | End: 2021-01-01

## 2021-01-01 RX ORDER — DEXAMETHASONE 4 MG/1
TABLET ORAL
Status: COMPLETED
Start: 2021-01-01 | End: 2021-01-01

## 2021-01-01 RX ORDER — AMOXICILLIN 250 MG
2 CAPSULE ORAL 2 TIMES DAILY
Status: DISCONTINUED | OUTPATIENT
Start: 2021-01-01 | End: 2021-01-01 | Stop reason: HOSPADM

## 2021-01-01 RX ORDER — ALENDRONATE SODIUM 70 MG/1
TABLET ORAL
Qty: 12 TAB | Refills: 0 | Status: SHIPPED | OUTPATIENT
Start: 2021-01-01 | End: 2021-01-01

## 2021-01-01 RX ORDER — ONDANSETRON 2 MG/ML
4 INJECTION INTRAMUSCULAR; INTRAVENOUS EVERY 4 HOURS PRN
Status: DISCONTINUED | OUTPATIENT
Start: 2021-01-01 | End: 2021-01-01 | Stop reason: HOSPADM

## 2021-01-01 RX ORDER — FUROSEMIDE 10 MG/ML
20 INJECTION INTRAMUSCULAR; INTRAVENOUS ONCE
Status: DISCONTINUED | OUTPATIENT
Start: 2021-01-01 | End: 2021-01-01

## 2021-01-01 RX ORDER — MORPHINE SULFATE 10 MG/ML
5 INJECTION, SOLUTION INTRAMUSCULAR; INTRAVENOUS
Status: DISCONTINUED | OUTPATIENT
Start: 2021-01-01 | End: 2021-01-01

## 2021-01-01 RX ORDER — METOPROLOL SUCCINATE 25 MG/1
12.5 TABLET, EXTENDED RELEASE ORAL DAILY
Qty: 50 TAB | Refills: 2 | Status: SHIPPED | OUTPATIENT
Start: 2021-01-01

## 2021-01-01 RX ORDER — DEXAMETHASONE 4 MG/1
6 TABLET ORAL DAILY
Status: DISCONTINUED | OUTPATIENT
Start: 2021-01-01 | End: 2021-01-01

## 2021-01-01 RX ORDER — DEXTROSE MONOHYDRATE 50 MG/ML
INJECTION, SOLUTION INTRAVENOUS CONTINUOUS
Status: DISCONTINUED | OUTPATIENT
Start: 2021-01-01 | End: 2021-01-01

## 2021-01-01 RX ORDER — SODIUM CHLORIDE 9 MG/ML
1000 INJECTION, SOLUTION INTRAVENOUS ONCE
Status: DISCONTINUED | OUTPATIENT
Start: 2021-01-01 | End: 2021-01-01

## 2021-01-01 RX ORDER — ACETAMINOPHEN 325 MG/1
650 TABLET ORAL EVERY 4 HOURS PRN
Status: DISCONTINUED | OUTPATIENT
Start: 2021-01-01 | End: 2021-09-25 | Stop reason: HOSPADM

## 2021-01-01 RX ORDER — FUROSEMIDE 10 MG/ML
40 INJECTION INTRAMUSCULAR; INTRAVENOUS
Status: DISCONTINUED | OUTPATIENT
Start: 2021-01-01 | End: 2021-01-01

## 2021-01-01 RX ORDER — MECLIZINE HYDROCHLORIDE 25 MG/1
12.5 TABLET ORAL 3 TIMES DAILY PRN
Status: DISCONTINUED | OUTPATIENT
Start: 2021-01-01 | End: 2021-01-01 | Stop reason: HOSPADM

## 2021-01-01 RX ORDER — SODIUM CHLORIDE 9 MG/ML
INJECTION, SOLUTION INTRAVENOUS CONTINUOUS
Status: DISCONTINUED | OUTPATIENT
Start: 2021-01-01 | End: 2021-01-01

## 2021-01-01 RX ORDER — LISINOPRIL 2.5 MG/1
7.5 TABLET ORAL EVERY MORNING
COMMUNITY

## 2021-01-01 RX ORDER — ATROPINE SULFATE 10 MG/ML
2 SOLUTION/ DROPS OPHTHALMIC EVERY 4 HOURS PRN
Status: DISCONTINUED | OUTPATIENT
Start: 2021-01-01 | End: 2021-09-25 | Stop reason: HOSPADM

## 2021-01-01 RX ADMIN — MORPHINE SULFATE 5 MG: 10 INJECTION INTRAVENOUS at 14:21

## 2021-01-01 RX ADMIN — TIOTROPIUM BROMIDE INHALATION SPRAY 5 MCG: 3.12 SPRAY, METERED RESPIRATORY (INHALATION) at 12:19

## 2021-01-01 RX ADMIN — DEXAMETHASONE SODIUM PHOSPHATE 6 MG: 4 INJECTION, SOLUTION INTRA-ARTICULAR; INTRALESIONAL; INTRAMUSCULAR; INTRAVENOUS; SOFT TISSUE at 05:44

## 2021-01-01 RX ADMIN — TIOTROPIUM BROMIDE INHALATION SPRAY 5 MCG: 3.12 SPRAY, METERED RESPIRATORY (INHALATION) at 07:03

## 2021-01-01 RX ADMIN — WATER 4 MG: 100 IRRIGANT IRRIGATION at 13:52

## 2021-01-01 RX ADMIN — DEXAMETHASONE 6 MG: 4 TABLET ORAL at 04:55

## 2021-01-01 RX ADMIN — MORPHINE SULFATE 5 MG: 10 INJECTION INTRAVENOUS at 10:25

## 2021-01-01 RX ADMIN — DILTIAZEM HYDROCHLORIDE 10 MG: 5 INJECTION INTRAVENOUS at 04:13

## 2021-01-01 RX ADMIN — DILTIAZEM HYDROCHLORIDE 30 MG: 30 TABLET, FILM COATED ORAL at 04:16

## 2021-01-01 RX ADMIN — CLOSTRIDIUM TETANI TOXOID ANTIGEN (FORMALDEHYDE INACTIVATED), CORYNEBACTERIUM DIPHTHERIAE TOXOID ANTIGEN (FORMALDEHYDE INACTIVATED), BORDETELLA PERTUSSIS TOXOID ANTIGEN (GLUTARALDEHYDE INACTIVATED), BORDETELLA PERTUSSIS FILAMENTOUS HEMAGGLUTININ ANTIGEN (FORMALDEHYDE INACTIVATED), BORDETELLA PERTUSSIS PERTACTIN ANTIGEN, AND BORDETELLA PERTUSSIS FIMBRIAE 2/3 ANTIGEN 0.5 ML: 5; 2; 2.5; 5; 3; 5 INJECTION, SUSPENSION INTRAMUSCULAR at 18:21

## 2021-01-01 RX ADMIN — APIXABAN 2.5 MG: 2.5 TABLET, FILM COATED ORAL at 06:34

## 2021-01-01 RX ADMIN — ATORVASTATIN CALCIUM 40 MG: 40 TABLET, FILM COATED ORAL at 18:36

## 2021-01-01 RX ADMIN — ATORVASTATIN CALCIUM 40 MG: 40 TABLET, FILM COATED ORAL at 21:42

## 2021-01-01 RX ADMIN — LORAZEPAM 0.5 MG: 2 INJECTION INTRAMUSCULAR; INTRAVENOUS at 11:29

## 2021-01-01 RX ADMIN — DILTIAZEM HYDROCHLORIDE 30 MG: 30 TABLET, FILM COATED ORAL at 13:49

## 2021-01-01 RX ADMIN — SODIUM CHLORIDE: 9 INJECTION, SOLUTION INTRAVENOUS at 01:52

## 2021-01-01 RX ADMIN — DIGOXIN 250 MCG: 0.25 INJECTION INTRAMUSCULAR; INTRAVENOUS at 04:15

## 2021-01-01 RX ADMIN — DILTIAZEM HYDROCHLORIDE 30 MG: 30 TABLET, FILM COATED ORAL at 13:41

## 2021-01-01 RX ADMIN — DILTIAZEM HYDROCHLORIDE 30 MG: 30 TABLET, FILM COATED ORAL at 13:51

## 2021-01-01 RX ADMIN — MORPHINE SULFATE 5 MG: 10 INJECTION INTRAVENOUS at 08:05

## 2021-01-01 RX ADMIN — ASPIRIN 81 MG: 81 TABLET, COATED ORAL at 18:18

## 2021-01-01 RX ADMIN — TIOTROPIUM BROMIDE INHALATION SPRAY 5 MCG: 3.12 SPRAY, METERED RESPIRATORY (INHALATION) at 09:01

## 2021-01-01 RX ADMIN — DILTIAZEM HYDROCHLORIDE 30 MG: 30 TABLET, FILM COATED ORAL at 04:55

## 2021-01-01 RX ADMIN — ASPIRIN 81 MG: 81 TABLET, COATED ORAL at 17:08

## 2021-01-01 RX ADMIN — ATORVASTATIN CALCIUM 40 MG: 40 TABLET, FILM COATED ORAL at 19:25

## 2021-01-01 RX ADMIN — DILTIAZEM HYDROCHLORIDE 30 MG: 30 TABLET, FILM COATED ORAL at 00:25

## 2021-01-01 RX ADMIN — DEXAMETHASONE 6 MG: 4 TABLET ORAL at 06:04

## 2021-01-01 RX ADMIN — ATORVASTATIN CALCIUM 40 MG: 40 TABLET, FILM COATED ORAL at 18:18

## 2021-01-01 RX ADMIN — FUROSEMIDE 40 MG: 10 INJECTION, SOLUTION INTRAMUSCULAR; INTRAVENOUS at 22:41

## 2021-01-01 RX ADMIN — MORPHINE SULFATE 5 MG: 10 INJECTION INTRAVENOUS at 17:22

## 2021-01-01 RX ADMIN — ASPIRIN 81 MG: 81 TABLET, CHEWABLE ORAL at 18:38

## 2021-01-01 RX ADMIN — DILTIAZEM HYDROCHLORIDE 30 MG: 30 TABLET, FILM COATED ORAL at 06:34

## 2021-01-01 RX ADMIN — LORAZEPAM 1 MG: 2 INJECTION INTRAMUSCULAR; INTRAVENOUS at 14:40

## 2021-01-01 RX ADMIN — DILTIAZEM HYDROCHLORIDE 30 MG: 30 TABLET, FILM COATED ORAL at 23:42

## 2021-01-01 RX ADMIN — DILTIAZEM HYDROCHLORIDE 30 MG: 30 TABLET, FILM COATED ORAL at 01:04

## 2021-01-01 RX ADMIN — DILTIAZEM HYDROCHLORIDE 30 MG: 30 TABLET, FILM COATED ORAL at 18:18

## 2021-01-01 RX ADMIN — SPIRONOLACTONE 12.5 MG: 25 TABLET ORAL at 05:06

## 2021-01-01 RX ADMIN — DILTIAZEM HYDROCHLORIDE 30 MG: 30 TABLET, FILM COATED ORAL at 18:39

## 2021-01-01 RX ADMIN — DILTIAZEM HYDROCHLORIDE 30 MG: 30 TABLET, FILM COATED ORAL at 05:42

## 2021-01-01 RX ADMIN — ACETAMINOPHEN 650 MG: 325 TABLET, FILM COATED ORAL at 21:42

## 2021-01-01 RX ADMIN — ASPIRIN 81 MG: 81 TABLET, COATED ORAL at 18:36

## 2021-01-01 RX ADMIN — DEXAMETHASONE 6 MG: 4 TABLET ORAL at 05:12

## 2021-01-01 RX ADMIN — SODIUM CHLORIDE: 9 INJECTION, SOLUTION INTRAVENOUS at 18:48

## 2021-01-01 RX ADMIN — DEXTROSE MONOHYDRATE: 50 INJECTION, SOLUTION INTRAVENOUS at 09:10

## 2021-01-01 RX ADMIN — DILTIAZEM HYDROCHLORIDE 30 MG: 30 TABLET, FILM COATED ORAL at 05:12

## 2021-01-01 RX ADMIN — ATROPINE SULFATE 2 DROP: 10 SOLUTION OPHTHALMIC at 17:40

## 2021-01-01 RX ADMIN — ENOXAPARIN SODIUM 40 MG: 40 INJECTION SUBCUTANEOUS at 06:33

## 2021-01-01 RX ADMIN — TIOTROPIUM BROMIDE INHALATION SPRAY 5 MCG: 3.12 SPRAY, METERED RESPIRATORY (INHALATION) at 08:34

## 2021-01-01 RX ADMIN — SODIUM CHLORIDE 1000 ML: 9 INJECTION, SOLUTION INTRAVENOUS at 13:10

## 2021-01-01 RX ADMIN — ATORVASTATIN CALCIUM 40 MG: 40 TABLET, FILM COATED ORAL at 17:05

## 2021-01-01 RX ADMIN — APIXABAN 2.5 MG: 2.5 TABLET, FILM COATED ORAL at 06:05

## 2021-01-01 RX ADMIN — DILTIAZEM HYDROCHLORIDE 30 MG: 30 TABLET, FILM COATED ORAL at 00:45

## 2021-01-01 RX ADMIN — APIXABAN 2.5 MG: 2.5 TABLET, FILM COATED ORAL at 04:55

## 2021-01-01 RX ADMIN — ATORVASTATIN CALCIUM 40 MG: 40 TABLET, FILM COATED ORAL at 18:38

## 2021-01-01 RX ADMIN — DEXAMETHASONE SODIUM PHOSPHATE 6 MG: 4 INJECTION, SOLUTION INTRA-ARTICULAR; INTRALESIONAL; INTRAMUSCULAR; INTRAVENOUS; SOFT TISSUE at 05:42

## 2021-01-01 RX ADMIN — APIXABAN 2.5 MG: 2.5 TABLET, FILM COATED ORAL at 18:36

## 2021-01-01 RX ADMIN — DEXAMETHASONE SODIUM PHOSPHATE 6 MG: 4 INJECTION, SOLUTION INTRA-ARTICULAR; INTRALESIONAL; INTRAMUSCULAR; INTRAVENOUS; SOFT TISSUE at 05:29

## 2021-01-01 RX ADMIN — DILTIAZEM HYDROCHLORIDE 30 MG: 30 TABLET, FILM COATED ORAL at 13:17

## 2021-01-01 RX ADMIN — ASPIRIN 81 MG: 81 TABLET, COATED ORAL at 17:18

## 2021-01-01 RX ADMIN — APIXABAN 2.5 MG: 2.5 TABLET, FILM COATED ORAL at 17:19

## 2021-01-01 RX ADMIN — TIOTROPIUM BROMIDE INHALATION SPRAY 5 MCG: 3.12 SPRAY, METERED RESPIRATORY (INHALATION) at 07:00

## 2021-01-01 RX ADMIN — DILTIAZEM HYDROCHLORIDE 30 MG: 30 TABLET, FILM COATED ORAL at 17:18

## 2021-01-01 RX ADMIN — ASPIRIN 81 MG: 81 TABLET, COATED ORAL at 19:25

## 2021-01-01 RX ADMIN — MORPHINE SULFATE 5 MG: 10 INJECTION INTRAVENOUS at 13:45

## 2021-01-01 RX ADMIN — DILTIAZEM HYDROCHLORIDE 30 MG: 30 TABLET, FILM COATED ORAL at 17:06

## 2021-01-01 RX ADMIN — ASPIRIN 81 MG: 81 TABLET, COATED ORAL at 05:06

## 2021-01-01 RX ADMIN — LORAZEPAM 0.5 MG: 2 INJECTION INTRAMUSCULAR; INTRAVENOUS at 05:43

## 2021-01-01 RX ADMIN — APIXABAN 2.5 MG: 2.5 TABLET, FILM COATED ORAL at 05:42

## 2021-01-01 RX ADMIN — APIXABAN 2.5 MG: 2.5 TABLET, FILM COATED ORAL at 18:39

## 2021-01-01 RX ADMIN — ENOXAPARIN SODIUM 40 MG: 40 INJECTION SUBCUTANEOUS at 05:44

## 2021-01-01 RX ADMIN — DEXAMETHASONE 6 MG: 4 TABLET ORAL at 06:34

## 2021-01-01 RX ADMIN — IOHEXOL 40 ML: 350 INJECTION, SOLUTION INTRAVENOUS at 02:49

## 2021-01-01 RX ADMIN — APIXABAN 2.5 MG: 2.5 TABLET, FILM COATED ORAL at 18:18

## 2021-01-01 RX ADMIN — MORPHINE SULFATE 5 MG: 10 INJECTION INTRAVENOUS at 12:43

## 2021-01-01 RX ADMIN — ATORVASTATIN CALCIUM 40 MG: 40 TABLET, FILM COATED ORAL at 17:18

## 2021-01-01 RX ADMIN — MORPHINE SULFATE 5 MG: 10 INJECTION INTRAVENOUS at 15:53

## 2021-01-01 RX ADMIN — SODIUM CHLORIDE: 9 INJECTION, SOLUTION INTRAVENOUS at 13:41

## 2021-01-01 RX ADMIN — LORAZEPAM 1 MG: 2 INJECTION INTRAMUSCULAR; INTRAVENOUS at 18:34

## 2021-01-01 RX ADMIN — DILTIAZEM HYDROCHLORIDE 30 MG: 30 TABLET, FILM COATED ORAL at 23:58

## 2021-01-01 RX ADMIN — ATORVASTATIN CALCIUM 40 MG: 40 TABLET, FILM COATED ORAL at 17:08

## 2021-01-01 RX ADMIN — LORAZEPAM 0.5 MG: 2 INJECTION INTRAMUSCULAR; INTRAVENOUS at 15:55

## 2021-01-01 RX ADMIN — FUROSEMIDE 40 MG: 10 INJECTION, SOLUTION INTRAMUSCULAR; INTRAVENOUS at 04:54

## 2021-01-01 RX ADMIN — DEXAMETHASONE SODIUM PHOSPHATE 6 MG: 4 INJECTION, SOLUTION INTRA-ARTICULAR; INTRALESIONAL; INTRAMUSCULAR; INTRAVENOUS; SOFT TISSUE at 14:54

## 2021-01-01 RX ADMIN — DILTIAZEM HYDROCHLORIDE 30 MG: 30 TABLET, FILM COATED ORAL at 23:40

## 2021-01-01 RX ADMIN — APIXABAN 2.5 MG: 2.5 TABLET, FILM COATED ORAL at 05:12

## 2021-01-01 RX ADMIN — ENOXAPARIN SODIUM 30 MG: 30 INJECTION SUBCUTANEOUS at 05:06

## 2021-01-01 RX ADMIN — SODIUM CHLORIDE: 9 INJECTION, SOLUTION INTRAVENOUS at 08:30

## 2021-01-01 RX ADMIN — APIXABAN 2.5 MG: 2.5 TABLET, FILM COATED ORAL at 17:05

## 2021-01-01 RX ADMIN — DEXAMETHASONE SODIUM PHOSPHATE 6 MG: 4 INJECTION, SOLUTION INTRA-ARTICULAR; INTRALESIONAL; INTRAMUSCULAR; INTRAVENOUS; SOFT TISSUE at 06:30

## 2021-01-01 RX ADMIN — DILTIAZEM HYDROCHLORIDE 30 MG: 30 TABLET, FILM COATED ORAL at 17:08

## 2021-01-01 RX ADMIN — ASPIRIN 81 MG: 81 TABLET, COATED ORAL at 17:06

## 2021-01-01 RX ADMIN — LORAZEPAM 1 MG: 2 INJECTION INTRAMUSCULAR; INTRAVENOUS at 05:55

## 2021-01-01 RX ADMIN — MAGNESIUM SULFATE 2 G: 2 INJECTION INTRAVENOUS at 04:00

## 2021-01-01 RX ADMIN — DILTIAZEM HYDROCHLORIDE 30 MG: 30 TABLET, FILM COATED ORAL at 06:30

## 2021-01-01 RX ADMIN — DILTIAZEM HYDROCHLORIDE 30 MG: 30 TABLET, FILM COATED ORAL at 13:22

## 2021-01-01 RX ADMIN — DILTIAZEM HYDROCHLORIDE 30 MG: 30 TABLET, FILM COATED ORAL at 06:04

## 2021-01-01 RX ADMIN — DILTIAZEM HYDROCHLORIDE 30 MG: 30 TABLET, FILM COATED ORAL at 12:29

## 2021-01-01 RX ADMIN — ENOXAPARIN SODIUM 40 MG: 40 INJECTION SUBCUTANEOUS at 17:08

## 2021-01-01 RX ADMIN — DILTIAZEM HYDROCHLORIDE 30 MG: 30 TABLET, FILM COATED ORAL at 18:36

## 2021-01-01 RX ADMIN — TIOTROPIUM BROMIDE INHALATION SPRAY 5 MCG: 3.12 SPRAY, METERED RESPIRATORY (INHALATION) at 07:52

## 2021-01-01 RX ADMIN — TIOTROPIUM BROMIDE INHALATION SPRAY 5 MCG: 3.12 SPRAY, METERED RESPIRATORY (INHALATION) at 10:48

## 2021-01-01 RX ADMIN — MORPHINE SULFATE 5 MG: 10 INJECTION INTRAVENOUS at 14:22

## 2021-01-01 RX ADMIN — MORPHINE SULFATE 5 MG: 10 INJECTION INTRAVENOUS at 17:40

## 2021-01-01 RX ADMIN — ATORVASTATIN CALCIUM 40 MG: 40 TABLET, FILM COATED ORAL at 17:13

## 2021-01-01 RX ADMIN — DILTIAZEM HYDROCHLORIDE 30 MG: 30 TABLET, FILM COATED ORAL at 03:44

## 2021-01-01 RX ADMIN — FUROSEMIDE 40 MG: 10 INJECTION, SOLUTION INTRAMUSCULAR; INTRAVENOUS at 13:53

## 2021-01-01 RX ADMIN — ASPIRIN 81 MG: 81 TABLET, COATED ORAL at 17:13

## 2021-01-01 RX ADMIN — MORPHINE SULFATE 5 MG: 10 INJECTION INTRAVENOUS at 01:42

## 2021-01-01 RX ADMIN — ACETAMINOPHEN 650 MG: 325 TABLET, FILM COATED ORAL at 01:26

## 2021-01-01 SDOH — ECONOMIC STABILITY: HOUSING INSECURITY
HOME SAFETY: PT HAS MULTIPLE CATS IN THE HOUSE, THERE ARE MULTIPLE BOWLS OF FOOD ALL THROUGHOUT THE HOUSE, ENCOURAGED PT TO CONSOLIDATE BOWLS AND FEED CATS ALL IN THE SAME PLACE TO HELP REDUCE THE RISK OF TRIPS AND OR FALLS.

## 2021-01-01 ASSESSMENT — ENCOUNTER SYMPTOMS
BLURRED VISION: 0
RESPIRATORY NEGATIVE: 1
WEIGHT LOSS: 0
COUGH: 0
FEVER: 0
TREMORS: 0
FOCAL WEAKNESS: 0
VOMITING: PT DENIES ANY EMESIS AT THIS TIME
HEADACHES: 0
FEVER: 0
WEAKNESS: 0
DIZZINESS: 1
SPEECH CHANGE: 0
PSYCHIATRIC NEGATIVE: 1
SEIZURES: 0
SORE THROAT: 0
LOSS OF CONSCIOUSNESS: 0
NAUSEA: 0
SENSORY CHANGE: 0
SPEECH CHANGE: 0
WEIGHT LOSS: 0
SORE THROAT: 0
DIARRHEA: 1
WEIGHT LOSS: 0
TINGLING: 0
DOUBLE VISION: 0
WEIGHT LOSS: 0
SHORTNESS OF BREATH: 1
COUGH: 0
WEAKNESS: 1
NAUSEA: 0
LOSS OF CONSCIOUSNESS: 0
DIZZINESS: 1
DEPRESSION: 0
TINGLING: 0
LOWEST PAIN SEVERITY IN PAST 24 HOURS: 0/10
SEIZURES: 0
PAIN LOCATION - RELIEVING FACTORS: REST
PALPITATIONS: 0
SHORTNESS OF BREATH: 1
TINGLING: 0
WEIGHT LOSS: 0
SPEECH CHANGE: 0
WEIGHT LOSS: 0
COUGH: 0
SORE THROAT: 0
CLAUDICATION: 0
DEPRESSION: 0
HEADACHES: 0
SENSORY CHANGE: 0
DIZZINESS: 1
LOSS OF CONSCIOUSNESS: 0
SEIZURES: 0
EYES NEGATIVE: 1
HEADACHES: 0
SHORTNESS OF BREATH: 1
PALPITATIONS: 0
CHILLS: 0
FOCAL WEAKNESS: 0
COUGH: 0
PAIN LOCATION - PAIN FREQUENCY: CONSTANT
FEVER: 0
PALPITATIONS: 0
SHORTNESS OF BREATH: 1
PAIN LOCATION: NECK
FOCAL WEAKNESS: 0
SEIZURES: 0
SENSORY CHANGE: 0
STRIDOR: 0
ORTHOPNEA: 0
NERVOUS/ANXIOUS: 0
WEAKNESS: 0
SEIZURES: 0
CHILLS: 0
DIZZINESS: 1
SHORTNESS OF BREATH: 0
SEIZURES: 0
FOCAL WEAKNESS: 0
BLURRED VISION: 0
BLURRED VISION: 0
WEIGHT LOSS: 0
TREMORS: 0
SHORTNESS OF BREATH: 1
FEVER: 1
MUSCULOSKELETAL NEGATIVE: 1
LOSS OF CONSCIOUSNESS: 0
SENSORY CHANGE: 0
NAUSEA: DENIES
LOSS OF CONSCIOUSNESS: 0
FOCAL WEAKNESS: 0
EYES NEGATIVE: 1
FEVER: 0
DIZZINESS: 1
WEAKNESS: 1
WEAKNESS: 1
HEADACHES: 0
NEUROLOGICAL NEGATIVE: 1
NAUSEA: 0
LOSS OF CONSCIOUSNESS: 0
COUGH: 0
NAUSEA: 0
SENSORY CHANGE: 0
WEAKNESS: 1
SHORTNESS OF BREATH: 1
FOCAL WEAKNESS: 0
PAIN LOCATION - EXACERBATING FACTORS: TURNING HEAD
NAUSEA: 0
DIZZINESS: 1
LOSS OF CONSCIOUSNESS: 0
WHEEZING: 0
PALPITATIONS: 0
LOSS OF CONSCIOUSNESS: 0
PSYCHIATRIC NEGATIVE: 1
PALPITATIONS: 0
PAIN: 1
SHORTNESS OF BREATH: 1
NERVOUS/ANXIOUS: 0
DIZZINESS: 1
HEADACHES: 0
PALPITATIONS: 0
DIARRHEA: 1
MYALGIAS: 0
LOSS OF CONSCIOUSNESS: 1
SPEECH CHANGE: 0
HEARTBURN: 0
BRUISES/BLEEDS EASILY: 0
COUGH: 0
SORE THROAT: 0
FALLS: 1
FEVER: 0
PALPITATIONS: 0
MYALGIAS: 0
WEIGHT LOSS: 0
COUGH: 0
WEAKNESS: 1
VOMITING: 0
SHORTNESS OF BREATH: 1
NERVOUS/ANXIOUS: 0
CARDIOVASCULAR NEGATIVE: 1
CARDIOVASCULAR NEGATIVE: 1
CLAUDICATION: 0
SORE THROAT: 0
SHORTNESS OF BREATH: 0
COUGH: 0
FOCAL WEAKNESS: 0
WEIGHT LOSS: 0
LOSS OF CONSCIOUSNESS: 0
SENSORY CHANGE: 0
HEADACHES: 0
BLURRED VISION: 0
DEPRESSION: 0
CONSTITUTIONAL NEGATIVE: 1
PALPITATIONS: 0
DIZZINESS: 0
NERVOUS/ANXIOUS: 0
NERVOUS/ANXIOUS: 0
SHORTNESS OF BREATH: 0
SHORTNESS OF BREATH: 1
SORE THROAT: 0
EYES NEGATIVE: 1
BRUISES/BLEEDS EASILY: 0
BLURRED VISION: 0
BLURRED VISION: 0
ORTHOPNEA: 0
LOSS OF CONSCIOUSNESS: 0
CHILLS: 0
FOCAL WEAKNESS: 0
DIZZINESS: 1
NAUSEA: 0
FEVER: 0
MENTAL STATUS CHANGE: 0
FOCAL WEAKNESS: 0
PND: 0
WEAKNESS: 1
BLURRED VISION: 0
FOCAL WEAKNESS: 0
PALPITATIONS: 0
WEIGHT LOSS: 0
HEADACHES: 0
NAUSEA: 0
HEADACHES: 0
FEVER: 0
PAIN LOCATION - PAIN QUALITY: STIFF NECK
GASTROINTESTINAL NEGATIVE: 1
SORE THROAT: 0
NEUROLOGICAL NEGATIVE: 1
TINGLING: 0
BLURRED VISION: 0
FEVER: 0
DEPRESSION: 0
CARDIOVASCULAR NEGATIVE: 1
PAIN SEVERITY GOAL: 0/10
COUGH: 0
TINGLING: 0
SORE THROAT: 0
SHORTNESS OF BREATH: T
SEIZURES: 0
HEMOPTYSIS: 0
INSOMNIA: 0
WHEEZING: 0
LOSS OF CONSCIOUSNESS: 0
FOCAL WEAKNESS: 0
NERVOUS/ANXIOUS: 0
PALPITATIONS: 0
RESPIRATORY NEGATIVE: 1
FEVER: 0
SPEECH CHANGE: 0
NERVOUS/ANXIOUS: 0
CHILLS: 1
COUGH: 0
VOMITING: DENIES
SENSORY CHANGE: 0
WEAKNESS: 1
BRUISES/BLEEDS EASILY: 0
PAIN LOCATION - PAIN DURATION: CONSTANT
ORTHOPNEA: 0
FEVER: 0
TREMORS: 0
PALPITATIONS: 0
TREMORS: 0
PND: 0
TREMORS: 0
DENIES PAIN: 1
LOSS OF CONSCIOUSNESS: 0
BLURRED VISION: 0
GASTROINTESTINAL NEGATIVE: 1
SEIZURES: 0
NAUSEA: 1
DEPRESSION: 0
DEPRESSION: 0
TREMORS: 0
HEADACHES: 0
PAIN SEVERITY GOAL: 0/10
TINGLING: 0
SPEECH CHANGE: 0
PALPITATIONS: 0
NAUSEA: 0
SPEECH CHANGE: 0
WEAKNESS: 1
SORE THROAT: 0
FEVER: 0
TREMORS: 0
TREMORS: 0
HEADACHES: 0
ABDOMINAL PAIN: 0
BLURRED VISION: 0
WEAKNESS: 1
FEVER: 0
NERVOUS/ANXIOUS: 0
COUGH: 0
RESPIRATORY NEGATIVE: 1
DIZZINESS: 1
NECK PAIN: 0
WEAKNESS: 1
SORE THROAT: 0
NAUSEA: 0
CLAUDICATION: 0
TREMORS: 0
NAUSEA: 0
DIZZINESS: 1
VOMITING: 0
BLOOD IN STOOL: 0
WEAKNESS: 1
TREMORS: 0
TINGLING: 0
HEADACHES: 0
DEPRESSION: 0
NAUSEA: 0
SENSORY CHANGE: 0
TINGLING: 0
TINGLING: 0
BLURRED VISION: 0
WEIGHT LOSS: 0
SUBJECTIVE PAIN PROGRESSION: UNCHANGED
NERVOUS/ANXIOUS: 0
HEADACHES: 0
BLURRED VISION: 0
PALPITATIONS: 0
COUGH: 0
BRUISES/BLEEDS EASILY: 0
HIGHEST PAIN SEVERITY IN PAST 24 HOURS: 0/10
DEPRESSION: 0
LOSS OF CONSCIOUSNESS: 0
TREMORS: 0
COUGH: 0
SENSORY CHANGE: 0
MUSCULOSKELETAL NEGATIVE: 1
SORE THROAT: 0
HIGHEST PAIN SEVERITY IN PAST 24 HOURS: 4/10
PALPITATIONS: 0
COUGH: 0
BACK PAIN: 0
SORE THROAT: 0
SPEECH CHANGE: 0
SEIZURES: 0
DEPRESSION: 0
SHORTNESS OF BREATH: T
NAUSEA: PT DENIES ANY NAUSEA AT THIS TIME
LOWEST PAIN SEVERITY IN PAST 24 HOURS: 0/10
CONSTIPATION: 0
DEPRESSION: 0
FEVER: 0
SEIZURES: 0
SENSORY CHANGE: 0
SHORTNESS OF BREATH: 1
DEPRESSION: 0
SHORTNESS OF BREATH: 0
SENSORY CHANGE: 0
SEIZURES: 0
TINGLING: 0
FEVER: 0
DIZZINESS: 1
SPEECH CHANGE: 0
WEIGHT LOSS: 0
MUSCULOSKELETAL NEGATIVE: 1
TINGLING: 0
FALLS: 0
PAIN LOCATION - PAIN SEVERITY: 4/10
FEVER: 0
PND: 0
TINGLING: 0
PERSON REPORTING PAIN: PATIENT
NAUSEA: 0
FOCAL WEAKNESS: 0
HEARTBURN: 0
NERVOUS/ANXIOUS: 0
ABDOMINAL PAIN: 0
LOSS OF CONSCIOUSNESS: 0
SPUTUM PRODUCTION: 0
VOMITING: 1
SPEECH CHANGE: 0
DIZZINESS: 1
DIZZINESS: 1
SPEECH CHANGE: 0
TINGLING: 0
DIZZINESS: 1
WEIGHT LOSS: 0

## 2021-01-01 ASSESSMENT — MINNESOTA LIVING WITH HEART FAILURE QUESTIONNAIRE (MLHF)
FEELING LIKE A BURDEN TO FAMILY AND FRIENDS: 2
DIFFICULTY WORKING TO EARN A LIVING: 0
EATING LESS FOODS YOU LIKE: 0
DIFFICULTY WITH SEXUAL ACTIVITIES: 0
WALKING ABOUT OR CLIMBING STAIRS DIFFICULT: 0
DIFFICULTY TO CONCENTRATE OR REMEMBERING THINGS: 2
DIFFICULTY GOING AWAY FROM HOME: 3
MAKING YOU WORRY: 2
LOSS OF SELF CONTROL IN YOUR LIFE: 2
DIFFICULTY SOCIALIZING WITH FAMILY OR FRIENDS: 0
COSTING YOU MONEY FOR MEDICAL CARE: 3
DIFFICULTY SLEEPING WELL AT NIGHT: 0
DIFFICULTY WITH RECREATIONAL PASTIMES, SPORTS, HOBBIES: 0
HAVING TO SIT OR LIE DOWN DURING THE DAY: 3
GIVING YOU SIDE EFFECTS FROM TREATMENTS: 0
SWELLING IN ANKLES OR LEGS: 0
TIRED, FATIGUED OR LOW ON ENERGY: 3
MAKING YOU SHORT OF BREATH: 0
WORKING AROUND THE HOUSE OR YARD DIFFICULT: 0
MAKING YOU STAY IN A HOSPITAL: 0
TOTAL_SCORE: 22
MAKING YOU FEEL DEPRESSED: 2

## 2021-01-01 ASSESSMENT — GAIT ASSESSMENTS
ASSISTIVE DEVICE: OTHER (COMMENTS)
GAIT LEVEL OF ASSIST: MINIMAL ASSIST
ASSISTIVE DEVICE: HAND HELD ASSIST
DEVIATION: BRADYKINETIC;SHUFFLED GAIT;DECREASED HEEL STRIKE;DECREASED TOE OFF;STEP TO
ASSISTIVE DEVICE: HAND HELD ASSIST
DISTANCE (FEET): 5
GAIT LEVEL OF ASSIST: MINIMAL ASSIST
DEVIATION: BRADYKINETIC;SHUFFLED GAIT
DISTANCE (FEET): 5
DISTANCE (FEET): 200
DISTANCE (FEET): 5
GAIT LEVEL OF ASSIST: MINIMAL ASSIST
GAIT LEVEL OF ASSIST: SUPERVISED

## 2021-01-01 ASSESSMENT — COGNITIVE AND FUNCTIONAL STATUS - GENERAL
WALKING IN HOSPITAL ROOM: A LOT
CLIMB 3 TO 5 STEPS WITH RAILING: A LOT
WALKING IN HOSPITAL ROOM: A LITTLE
DRESSING REGULAR UPPER BODY CLOTHING: A LITTLE
DAILY ACTIVITIY SCORE: 18
MOVING TO AND FROM BED TO CHAIR: UNABLE
DRESSING REGULAR UPPER BODY CLOTHING: A LITTLE
MOBILITY SCORE: 15
CLIMB 3 TO 5 STEPS WITH RAILING: A LOT
MOBILITY SCORE: 24
MOVING FROM LYING ON BACK TO SITTING ON SIDE OF FLAT BED: A LOT
CLIMB 3 TO 5 STEPS WITH RAILING: A LOT
DRESSING REGULAR LOWER BODY CLOTHING: A LITTLE
SUGGESTED CMS G CODE MODIFIER MOBILITY: CK
DRESSING REGULAR LOWER BODY CLOTHING: A LITTLE
DRESSING REGULAR LOWER BODY CLOTHING: A LOT
MOBILITY SCORE: 14
PERSONAL GROOMING: A LITTLE
DAILY ACTIVITIY SCORE: 17
EATING MEALS: A LITTLE
MOVING TO AND FROM BED TO CHAIR: UNABLE
SUGGESTED CMS G CODE MODIFIER DAILY ACTIVITY: CK
SUGGESTED CMS G CODE MODIFIER DAILY ACTIVITY: CK
DRESSING REGULAR UPPER BODY CLOTHING: A LITTLE
MOVING FROM LYING ON BACK TO SITTING ON SIDE OF FLAT BED: UNABLE
SUGGESTED CMS G CODE MODIFIER MOBILITY: CH
PERSONAL GROOMING: A LITTLE
SUGGESTED CMS G CODE MODIFIER DAILY ACTIVITY: CK
TOILETING: A LITTLE
MOVING TO AND FROM BED TO CHAIR: A LOT
STANDING UP FROM CHAIR USING ARMS: A LOT
MOBILITY SCORE: 11
WALKING IN HOSPITAL ROOM: A LITTLE
HELP NEEDED FOR BATHING: A LOT
CLIMB 3 TO 5 STEPS WITH RAILING: A LOT
SUGGESTED CMS G CODE MODIFIER DAILY ACTIVITY: CK
TOILETING: A LITTLE
TOILETING: A LOT
SUGGESTED CMS G CODE MODIFIER MOBILITY: CL
DRESSING REGULAR UPPER BODY CLOTHING: A LITTLE
MOVING TO AND FROM BED TO CHAIR: A LOT
DAILY ACTIVITIY SCORE: 16
DRESSING REGULAR UPPER BODY CLOTHING: A LITTLE
SUGGESTED CMS G CODE MODIFIER MOBILITY: CL
TOILETING: A LOT
MOVING FROM LYING ON BACK TO SITTING ON SIDE OF FLAT BED: A LITTLE
DAILY ACTIVITIY SCORE: 18
CLIMB 3 TO 5 STEPS WITH RAILING: TOTAL
HELP NEEDED FOR BATHING: A LOT
MOBILITY SCORE: 16
STANDING UP FROM CHAIR USING ARMS: A LITTLE
DAILY ACTIVITIY SCORE: 17
HELP NEEDED FOR BATHING: TOTAL
WALKING IN HOSPITAL ROOM: A LOT
DAILY ACTIVITIY SCORE: 24
SUGGESTED CMS G CODE MODIFIER MOBILITY: CK
TOILETING: A LOT
WALKING IN HOSPITAL ROOM: A LITTLE
MOVING TO AND FROM BED TO CHAIR: A LOT
MOBILITY SCORE: 16
SUGGESTED CMS G CODE MODIFIER MOBILITY: CK
DRESSING REGULAR LOWER BODY CLOTHING: A LOT
TURNING FROM BACK TO SIDE WHILE IN FLAT BAD: A LITTLE
DRESSING REGULAR LOWER BODY CLOTHING: A LOT
MOVING FROM LYING ON BACK TO SITTING ON SIDE OF FLAT BED: UNABLE
STANDING UP FROM CHAIR USING ARMS: A LITTLE
SUGGESTED CMS G CODE MODIFIER DAILY ACTIVITY: CH
SUGGESTED CMS G CODE MODIFIER DAILY ACTIVITY: CK
HELP NEEDED FOR BATHING: A LOT
HELP NEEDED FOR BATHING: A LITTLE
MOVING FROM LYING ON BACK TO SITTING ON SIDE OF FLAT BED: A LOT

## 2021-01-01 ASSESSMENT — ACTIVITIES OF DAILY LIVING (ADL)
TOILETING: INDEPENDENT
TOILETING: INDEPENDENT
OASIS_M1830: 05

## 2021-01-01 ASSESSMENT — PAIN DESCRIPTION - PAIN TYPE
TYPE: ACUTE PAIN

## 2021-01-01 ASSESSMENT — CHA2DS2 SCORE
VASCULAR DISEASE: NO
HYPERTENSION: YES
SEX: FEMALE
AGE 65 TO 74: NO
PRIOR STROKE OR TIA OR THROMBOEMBOLISM: NO
CHF OR LEFT VENTRICULAR DYSFUNCTION: NO
DIABETES: NO
AGE 75 OR GREATER: YES
CHA2DS2 VASC SCORE: 4

## 2021-01-01 ASSESSMENT — LIFESTYLE VARIABLES
DOES PATIENT WANT TO STOP DRINKING: NO
TOTAL SCORE: 0
CONSUMPTION TOTAL: NEGATIVE
ON A TYPICAL DAY WHEN YOU DRINK ALCOHOL HOW MANY DRINKS DO YOU HAVE: 0
TOTAL SCORE: 0
HOW MANY TIMES IN THE PAST YEAR HAVE YOU HAD 5 OR MORE DRINKS IN A DAY: 0
AVERAGE NUMBER OF DAYS PER WEEK YOU HAVE A DRINK CONTAINING ALCOHOL: 0
ALCOHOL_USE: NO
TOTAL SCORE: 0
ON A TYPICAL DAY WHEN YOU DRINK ALCOHOL HOW MANY DRINKS DO YOU HAVE: 0
AVERAGE NUMBER OF DAYS PER WEEK YOU HAVE A DRINK CONTAINING ALCOHOL: 0
EVER HAD A DRINK FIRST THING IN THE MORNING TO STEADY YOUR NERVES TO GET RID OF A HANGOVER: NO
EVER FELT BAD OR GUILTY ABOUT YOUR DRINKING: NO
ALCOHOL_USE: NO
HAVE YOU EVER FELT YOU SHOULD CUT DOWN ON YOUR DRINKING: NO
CONSUMPTION TOTAL: NEGATIVE
HAVE PEOPLE ANNOYED YOU BY CRITICIZING YOUR DRINKING: NO
PACK_YEARS: 59
EVER FELT BAD OR GUILTY ABOUT YOUR DRINKING: NO
TOTAL SCORE: 0
HOW MANY TIMES IN THE PAST YEAR HAVE YOU HAD 5 OR MORE DRINKS IN A DAY: 0
EVER HAD A DRINK FIRST THING IN THE MORNING TO STEADY YOUR NERVES TO GET RID OF A HANGOVER: NO
HAVE PEOPLE ANNOYED YOU BY CRITICIZING YOUR DRINKING: NO
DOES PATIENT WANT TO STOP DRINKING: NO
TOTAL SCORE: 0
TOTAL SCORE: 0
HAVE YOU EVER FELT YOU SHOULD CUT DOWN ON YOUR DRINKING: NO

## 2021-01-01 ASSESSMENT — FIBROSIS 4 INDEX
FIB4 SCORE: 2.22
FIB4 SCORE: 3.67
FIB4 SCORE: 2.22
FIB4 SCORE: 2.22
FIB4 SCORE: 2.44
FIB4 SCORE: 4.55
FIB4 SCORE: 3.55
FIB4 SCORE: 4.55
FIB4 SCORE: 4.55
FIB4 SCORE: 3.67
FIB4 SCORE: 4.55
FIB4 SCORE: 5.93
FIB4 SCORE: 2.22
FIB4 SCORE: 3.93

## 2021-01-01 ASSESSMENT — PATIENT HEALTH QUESTIONNAIRE - PHQ9
1. LITTLE INTEREST OR PLEASURE IN DOING THINGS: NOT AT ALL
1. LITTLE INTEREST OR PLEASURE IN DOING THINGS: 00
CLINICAL INTERPRETATION OF PHQ2 SCORE: 0
2. FEELING DOWN, DEPRESSED, IRRITABLE, OR HOPELESS: 00
1. LITTLE INTEREST OR PLEASURE IN DOING THINGS: NOT AT ALL
2. FEELING DOWN, DEPRESSED, IRRITABLE, OR HOPELESS: NOT AT ALL
2. FEELING DOWN, DEPRESSED, IRRITABLE, OR HOPELESS: NOT AT ALL
SUM OF ALL RESPONSES TO PHQ9 QUESTIONS 1 AND 2: 0
SUM OF ALL RESPONSES TO PHQ9 QUESTIONS 1 AND 2: 0

## 2021-01-01 ASSESSMENT — COPD QUESTIONNAIRES
COPD SCREENING SCORE: 4
HAVE YOU SMOKED AT LEAST 100 CIGARETTES IN YOUR ENTIRE LIFE: YES
DO YOU EVER COUGH UP ANY MUCUS OR PHLEGM?: NO/ONLY WITH OCCASIONAL COLDS OR INFECTIONS
IN THE PAST 12 MONTHS DO YOU DO LESS THAN YOU USED TO BECAUSE OF YOUR BREATHING PROBLEMS: DISAGREE/UNSURE
DURING THE PAST 4 WEEKS HOW MUCH DID YOU FEEL SHORT OF BREATH: NONE/LITTLE OF THE TIME

## 2021-01-06 NOTE — PROGRESS NOTES
Chief Complaint   Patient presents with   • Congestive Heart Failure       Subjective:   Vargas Veloz is a 80 y.o. female who presents today with her son, Arden, for new asymptomatic LV dysfunction, and LBBB follow-up after recent admission to the hospital on 12/16/2020. Pt was last seen on 12/24/2020. Since her last appointment, she notified our office regarding dizziness with elevated blood pressure. However, Ms. Veloz was unsure if her home cuff was functioning properly.    Pt has PMH of dyslipidemia, OA, hypertension, COPD, and tobacco dependence. Pt reports continued tobacco use where she smokes 1 cigarette a day. She continues to cut down but is not interested in quitting at this time.     Today, We evaluated her home BP cuff in office. Her cuff is 10 points higher than clinic BP measurements. We discussed maybe obtaining a pediatric cuff, as her cuff is quite large for her arm. Pt reports continued dizziness, but improves with meclizine. Pt able to complete and tolerate baseline activities. Pt also reports difficulty in following low sodium diet restrictions. Pt reports that she has decreased her smoking to 1 cigarette a day. Patient denies chest pain, shortness of breath, palpitations, orthopnea, PND or Edema. Based on physical examination findings, patient is euvolemic. No JVD, lungs are clear to auscultation, no pitting edema in bilateral lower extremities, no ascites. Dry weight is 103-104 lbs.     Past Medical History:   Diagnosis Date   • Dyslipidemia 2/15/2013   • Osteoarthritis 2/6/2013   • Osteoporosis    • Thrombocytopenia (HCC) 2/15/2013     Past Surgical History:   Procedure Laterality Date   • ORIF, HUMERUS      age 9   • TUBAL COAGULATION LAPAROSCOPIC BILATERAL       Family History   Problem Relation Age of Onset   • Heart Disease Mother         MI   • Hypertension Mother    • Cancer Father         colon   • Lung Disease Father         COPD   • Heart Disease Sister         pacer placed   • Stroke  Brother 50   • Heart Disease Brother 60        pace maker   • Other Brother         Vertigo   • Other Son         Back issues work related   • No Known Problems Son    • No Known Problems Son    • Lung Disease Daughter         COPD   • Other Grandson         Kidney and liver failure   • Alcohol abuse Grandson      Social History     Socioeconomic History   • Marital status:      Spouse name: Not on file   • Number of children: Not on file   • Years of education: Not on file   • Highest education level: Not on file   Occupational History   • Not on file   Social Needs   • Financial resource strain: Not on file   • Food insecurity     Worry: Never true     Inability: Never true   • Transportation needs     Medical: No     Non-medical: No   Tobacco Use   • Smoking status: Current Every Day Smoker     Packs/day: 1.50     Years: 59.00     Pack years: 88.50     Types: Cigarettes   • Smokeless tobacco: Never Used   Substance and Sexual Activity   • Alcohol use: No     Alcohol/week: 0.0 oz     Frequency: Never     Binge frequency: Never   • Drug use: No   • Sexual activity: Never     Comment:  - 57 years   Lifestyle   • Physical activity     Days per week: Not on file     Minutes per session: Not on file   • Stress: Not on file   Relationships   • Social connections     Talks on phone: Not on file     Gets together: Not on file     Attends Gnosticism service: Not on file     Active member of club or organization: Not on file     Attends meetings of clubs or organizations: Not on file     Relationship status: Not on file   • Intimate partner violence     Fear of current or ex partner: Not on file     Emotionally abused: Not on file     Physically abused: Not on file     Forced sexual activity: Not on file   Other Topics Concern   • Not on file   Social History Narrative   • Not on file     Allergies   Allergen Reactions   • Sulfa Drugs Unspecified     Unknown reaction       Outpatient Encounter Medications as of  1/7/2021   Medication Sig Dispense Refill   • lisinopril (PRINIVIL) 10 MG Tab Take 1 Tab by mouth every day. 100 Tab 1   • metoprolol SR (TOPROL XL) 25 MG TABLET SR 24 HR Take 0.5 Tabs by mouth every day. 50 Tab 2   • atorvastatin (LIPITOR) 40 MG Tab Take 1 Tab by mouth every day. 30 Tab 5   • aspirin EC 81 MG EC tablet Take 1 Tab by mouth every day. 30 Tab 0   • Iron-Vitamins (GERITOL) Liquid Take 5 mL by mouth every day.     • ibuprofen (MOTRIN) 200 MG Tab Take 200 mg by mouth every 6 hours as needed for Mild Pain, Headache or Inflammation.     • alendronate (FOSAMAX) 70 MG Tab TAKE 1 TABLET BY MOUTH EVERY 7 DAYS. PT TO MAKE APPT PRIOR TO MORE REFILLS. 12 Tab 0   • meloxicam (MOBIC) 7.5 MG Tab TAKE 1 TABLET BY MOUTH EVERY DAY (Patient taking differently: Take 7.5 mg by mouth 1 time a day as needed for Moderate Pain or Inflammation.) 90 Tab 0   • CALCIUM PO Take 1 Tab by mouth every morning.     • Cholecalciferol (VITAMIN D3) 3000 UNITS Tab Take 3,000 Units by mouth every evening.     • meclizine (ANTIVERT) 25 MG TABS Take 1 Tab by mouth 3 times a day as needed. Indications: Sensation of Spinning or Whirling 30 Tab 0   • [DISCONTINUED] lisinopril (PRINIVIL) 5 MG Tab Take 1 Tab by mouth every day. 30 Tab 5   • [DISCONTINUED] metoprolol SR (TOPROL XL) 25 MG TABLET SR 24 HR Take 0.5 Tabs by mouth every day. 30 Tab 2     No facility-administered encounter medications on file as of 1/7/2021.      Review of Systems   Constitutional: Negative for chills, fever, malaise/fatigue and weight loss.   Respiratory: Negative for cough and shortness of breath.    Cardiovascular: Negative for chest pain, palpitations, orthopnea, claudication, leg swelling and PND.   Gastrointestinal: Negative for abdominal pain, heartburn, nausea and vomiting.   Genitourinary: Negative for dysuria and frequency.   Musculoskeletal: Negative for myalgias.   Neurological: Positive for dizziness. Negative for tingling and loss of consciousness.  "  Endo/Heme/Allergies: Does not bruise/bleed easily.        Objective:   BP (!) 164/80 (BP Location: Left arm, Patient Position: Sitting, BP Cuff Size: Adult)   Pulse 63   Ht 1.702 m (5' 7\")   Wt 47.2 kg (104 lb)   SpO2 97%   BMI 16.29 kg/m²       Physical Exam   Constitutional: She is oriented to person, place, and time. She appears well-developed. No distress.   HENT:   Head: Normocephalic and atraumatic.   Eyes: Pupils are equal, round, and reactive to light. EOM are normal.   Neck: No JVD present.   Cardiovascular: Normal rate, regular rhythm, normal heart sounds and intact distal pulses. Exam reveals no gallop and no friction rub.   No murmur heard.  Pulmonary/Chest: Effort normal. No respiratory distress. She has decreased breath sounds in the right lower field and the left lower field.   Abdominal: Soft. Bowel sounds are normal. She exhibits no distension.   Musculoskeletal:         General: No edema.   Neurological: She is alert and oriented to person, place, and time.   Skin: Skin is warm and dry. No erythema. There is pallor.   Psychiatric: She has a normal mood and affect. Her behavior is normal.   Vitals reviewed.    Lab Results   Component Value Date/Time    CHOLSTRLTOT 134 07/15/2020 10:34 AM    LDL 60 07/15/2020 10:34 AM    HDL 61 07/15/2020 10:34 AM    TRIGLYCERIDE 64 07/15/2020 10:34 AM       Lab Results   Component Value Date/Time    SODIUM 137 12/16/2020 12:38 PM    POTASSIUM 4.0 12/16/2020 12:38 PM    CHLORIDE 100 12/16/2020 12:38 PM    CO2 25 12/16/2020 12:38 PM    GLUCOSE 97 12/16/2020 12:38 PM    BUN 17 12/16/2020 12:38 PM    CREATININE 0.71 12/16/2020 12:38 PM     Lab Results   Component Value Date/Time    ALKPHOSPHAT 98 12/16/2020 12:38 PM    ASTSGOT 15 12/16/2020 12:38 PM    ALTSGPT 12 12/16/2020 12:38 PM    TBILIRUBIN 1.7 (H) 12/16/2020 12:38 PM     EKG (12/16/2020): Sinus rhythm with PACs and LBBB    Echo (12/16/2020): Severely reduced left ventricular systolic function.  Left " ventricular ejection fraction is visually estimated to be 30%.  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  Otherwise global hypokinesis.  Grade I diastolic dysfunction.  Mild concentric left ventricular hypertrophy.  The right ventricle was normal in size and function.  No significant valvular regurgitation or stenosis.   Right atrial pressure is estimated to be 3 mmHg.  Estimated right ventricular systolic pressure is 39 mmHg.  No prior study is available for comparison.      NM Cardiac Stress Test (12/17/2020):    No evidence of significant jeopardized viable myocardium or prior myocardial    infarction.   LV EF is 47% with diffuse hypokinesis and dyskinesis in the inferoseptal    myocardium. Previous EF by ECHO is 30%. Discrepancy is likely artifactual.    Please refer to ECHO as a more accurate estimate.   ECG INTERPRETATION   Nondiagnostic ECG portion of a regadenoson nuclear stress test.  Assessment:     1. Essential hypertension  REFERRAL TO PHARMACOTHERAPY SERVICE    Basic Metabolic Panel   2. Stage B ACC/AHA asymptomatic heart failure (HCC)  REFERRAL TO PHARMACOTHERAPY SERVICE    Basic Metabolic Panel   3. Tobacco abuse     4. Dyslipidemia     5. Coronary artery disease involving native coronary artery of native heart without angina pectoris     6. High risk medication use         Medical Decision Making:  Today's Assessment / Status / Plan:   HFrEF, Stage B, Class I-II, LVEF 30%  -Heart failure due to prior infarct or toxin induced.  -Increase lisinopril 10 mg daily.   -Continue Metoprolol XL 12.5 mg daily. Recommend trail at night to improve symptoms.   -Repeat Echo in 3-6 months  -Reinforced s/sx of worsening heart failure with patient and weight monitoring. Pt verbalizes understanding. Pt to call office or RTC if present.    -PUMP line number [phone] (PUMP)     LBBB  -. No indication for CRT at this time. Will continue to monitor.    Hypertension  -Today in office blood  pressure is improved, but continues to be uncontrolled with BP rechecked at 150/82.  Home blood pressure recordings are reported as 180's/80-90's.  We will increase her medication with close follow-up and assistance of pharmacotherapy clinic. Medication recommendations per above.    CAD  -Stress test showed prior, completed, inferior basal infarct  -Pt denies chest pain  -Secondary prevention with ASA 81 mg and atorvastatin 40 mg daily     Tobacco Cessation  -I spent 5 minutes ([id number]) discussing the need for smoking/tobacco cessation. We discussed measures for quitting including replacements such as nicotine gum/nicotine patch. Pt declined these measures and may reach cessation on her own over time.      High Risk Medication Use  -This includes monitoring the dose adjustments with lisinopril  -Close monitoring discussed with patient.  Lab work (BMP) ordered.    FU in clinic in 1 month. Sooner if needed.    Patient verbalizes understanding and agrees with the plan of care.     Collaborating MD:Dr. Enrico MD    PLEASE NOTE: This Note was created using voice recognition Software. I have made every reasonable attempt to correct obvious errors, but I expect that there are errors of grammar and possibly content that I did not discover before finalizing the note

## 2021-01-07 PROBLEM — Z79.899 HIGH RISK MEDICATION USE: Status: ACTIVE | Noted: 2021-01-01

## 2021-01-07 PROBLEM — I25.10 CORONARY ARTERY DISEASE INVOLVING NATIVE CORONARY ARTERY OF NATIVE HEART WITHOUT ANGINA PECTORIS: Status: ACTIVE | Noted: 2021-01-01

## 2021-01-07 NOTE — PATIENT INSTRUCTIONS
-Increase lisinopril 10 mg daily  -Continue Metoprolol XL 12.5 mg daily* try taking it at night  -ASA 81 mg   -atorvastatin 40 mg daily    Checking Blood Pressure:  -Blood pressure cuff, spend in the $40-65, with good return policy  -It should be automatic, upper arm, measure your arm to get the correct size, probably adult Large  -Put the cuff in place, rest arm on table near height of your heart, sit quietly for 5 min, legs uncrossed, with back support, then take your blood pressure, write it down, keep a log  -Check no more than 1 time day, maybe 4-5 times per week, try different times of day.  -Can bring your cuff to at least one appointment where it can be calibrated to a manual cuff if you are concerned.  -Goal blood pressure is at least under 130/80, ideally under 120/80.  If you think your BP is overall too high, let us know in the office, we can adjust medications, can use [employer] or call the [email or web address] office: [phone].    Salt=sodium=sea salt, guidelines say stay under 2,500 mg daily but I ask for under 4,000 mg daily.  Get salt smart, start looking at labels, count it up.  Salt is hidden in everything, salad dressing, sauces, cheese, most canned food, any processed meat.

## 2021-01-12 NOTE — TELEPHONE ENCOUNTER
Left voicemail message for patient to return call to [facility] to establish care      CHF  Wendy Guevara, Clinical Pharmacist, CDE, CACP

## 2021-01-18 NOTE — PROGRESS NOTES
appt with our CHF clinic on  2/16 at 10:30am     Mayra Veloz  [address]   [address]  [facility] [address]    PCP:  Tonya Stack M.D.  [address]  [facility] [id number] [phone]    Natanael Person, PharmD, MS, BCACP, [facility]  Phone [phone] fax [phone]    This note was created using voice recognition software (Dragon). The accuracy of the dictation is limited by the abilities of the software. I have reviewed the note prior to signing, however some errors in grammar and context are still possible. If you have any questions related to this note please do not hesitate to contact our office.

## 2021-01-26 NOTE — RESULT ENCOUNTER NOTE
I tried to call to let her know her lab results were normal. She answered and hung up. We can try again in a few days or I can tell her when I see her on 2/11. Thank you.

## 2021-01-28 NOTE — TELEPHONE ENCOUNTER
JEFFREY Freedman.  Juanita Jean Baptiste R.N.             I tried to call to let her know her lab results were normal. She answered and hung up. We can try again in a few days or I can tell her when I see her on 2/11. Thank you.      Called pt and notified of normal lab results.

## 2021-02-16 NOTE — NON-PROVIDER
CHF Pharmacotherapy visit - Visit  Date of Service: 02/16/21  Informed written consent was given on: 2/16/21    Mayra Veloz is here for CHF       HPI  Pertinent Interval History since last visit:   Pt is new to our services. She was diagnosed with HFrEF in December 2020. She has been having issues with dizziness and has been hypertensive.  Most recent EF:  30%  Changes to laboratory values since last visit:  Labs are WNL  Current CHF Medications - including dose:   Entresto or ACE/ARB:Lisinopril 10 mg daily  Beta blocker: Toprol XL 25 mg tab - 1/2 tab po daily  Diuretic:None  Aldosterone antagonist: None  Digoxin 125 mcg po daily    Changes to weight since last visit:   Lost 2 lbs since last visit  Home BP:  Averaging 160/90 - also, her BP monitor at home measures 10 points more than our office BP monitors per last visit with Cassy LANZA    Current Adherence to CHF Therapies:  Complete    SOCIAL HISTORY  Social History     Tobacco Use   Smoking Status Current Every Day Smoker   • Packs/day: 1.50   • Years: 59.00   • Pack years: 88.50   • Types: Cigarettes   Smokeless Tobacco Never Used      Change in weight: Lost 2 lbs since last visit  Exercise habits: Pt walks as often as she can every day   Diet: low sodium      DATA REVIEW  Most Recent CMP Panel:   Lab Results   Component Value Date    SODIUM 137 01/21/2021    POTASSIUM 4.2 01/21/2021    CHLORIDE 101 01/21/2021    CO2 27 01/21/2021    ANION 9.0 01/21/2021    GLUCOSE 77 01/21/2021    BUN 20 01/21/2021    CREATININE 0.76 01/21/2021    CALCIUM 9.6 01/21/2021    ASTSGOT 15 12/16/2020    ALTSGPT 12 12/16/2020    ALKPHOSPHAT 98 12/16/2020    TBILIRUBIN 1.7 (H) 12/16/2020    ALBUMIN 5.1 (H) 12/16/2020    AGRATIO 1.6 12/16/2020       Renal function:  CrCl 44 ml/min    ASSESSMENT AND PLAN  Changes to medications:  Added spironolactone 25 mg PO daily - per inpatient cardiology note, this was not initiated during admission d/t pt being hypotensive and was planned  to be added as outpatient when pt's BP increased.    Lifestyle Recommendations From Today's Visit:   Continue exercising as tolerated - aim to walk every day  Continue to restrict daily sodium intake to 2 gm      Recommendations for Other Cardiovascular Risk Factors, ricky all that apply:   Smoking- Pt still smokes 1 cigarette/day. She is not willing to quit at this time. Encouraged pt to continue reducing tobacco use    Resulting CHF medications today (changes are bolded)  Entresto or ACE/ARB:Lisinopril 10 mg daily  Beta blocker: Toprol XL 25 mg tab - 1/2 tab po daily  Diuretic:None  Aldosterone antagonist: Spironolactone 25 mg daily  Digoxin 125 mcg po daily    Studies Ordered at Todays Visit:  None   Blood Work Ordered At Today's visit:   None  Follow-Up:   1 week with MYLA Madera  3 weeks with pharmacy    Melly Cruz PharmD    CC:  COREY Marsh, MARTA Bronson*    Medications reconciled  Flow sheets updated

## 2021-02-22 NOTE — PATIENT INSTRUCTIONS
Decrease spironolactone to 12.5 mg daily (half tablet)  Continue Lisinopril 5 mg twice a day  Continue metoprolol XL 12.5 mg daily    Schedule Echo to be completed prior to follow-up with Dr. Carlson

## 2021-02-22 NOTE — PROGRESS NOTES
Chief Complaint   Patient presents with   • HTN (Uncontrolled)     F/V DX:HTN       Subjective:   Vargas Veloz is a 80 y.o. female who presents today with her son, Arden, for new asymptomatic LV dysfunction, and LBBB follow-up after recent admission to the hospital on 12/16/2020. Pt was last seen on 1/7/2021.  Since she was last seen, she also established care with pharmacotherapy clinic for assistance in titrating medications to goal.    Pt has PMH of dyslipidemia, OA, hypertension, COPD, and tobacco dependence. Pt reports continued tobacco use where she smokes 1 cigarette a day. She continues to cut down but is not interested in quitting at this time.     Today, patient reports increased dizziness. However, her home blood pressures have been at goal patient reports pressure ranging from 119-134/70s to 80s.  Patient reports that she has been taking her lisinopril 5 mg twice a day and has stopped the spironolactone since Saturday and reported on Sunday her blood pressures were in the 160s.  Patient agreeable to continue to try spironolactone at half the dose.  Patient also agreeable to continue lisinopril 5 mg twice a day. Overall, patient feels well, denies chest pain, shortness of breath, palpitations, orthopnea, PND or Edema. Based on physical examination findings, patient is euvolemic. No JVD, lungs are clear to auscultation, no pitting edema in bilateral lower extremities, no ascites.  Patient reports stable home dry weight at 101 pounds.    Patient to follow-up with pharmacotherapy in 3 weeks.  We will also get a follow-up echo.    1/7/2021: We evaluated her home BP cuff in office. Her cuff is 10 points higher than clinic BP measurements. We discussed maybe obtaining a pediatric cuff, as her cuff is quite large for her arm. Pt reports continued dizziness, but improves with meclizine. Pt able to complete and tolerate baseline activities. Pt also reports difficulty in following low sodium diet restrictions. Pt  reports that she has decreased her smoking to 1 cigarette a day. Patient denies chest pain, shortness of breath, palpitations, orthopnea, PND or Edema. Based on physical examination findings, patient is euvolemic. No JVD, lungs are clear to auscultation, no pitting edema in bilateral lower extremities, no ascites. Dry weight is 103-104 lbs.     Past Medical History:   Diagnosis Date   • Dyslipidemia 2/15/2013   • Osteoarthritis 2/6/2013   • Osteoporosis    • Thrombocytopenia (HCC) 2/15/2013     Past Surgical History:   Procedure Laterality Date   • ORIF, HUMERUS      age 9   • TUBAL COAGULATION LAPAROSCOPIC BILATERAL       Family History   Problem Relation Age of Onset   • Heart Disease Mother         MI   • Hypertension Mother    • Cancer Father         colon   • Lung Disease Father         COPD   • Heart Disease Sister         pacer placed   • Stroke Brother 50   • Heart Disease Brother 60        pace maker   • Other Brother         Vertigo   • Other Son         Back issues work related   • No Known Problems Son    • No Known Problems Son    • Lung Disease Daughter         COPD   • Other Grandson         Kidney and liver failure   • Alcohol abuse Grandson      Social History     Socioeconomic History   • Marital status:      Spouse name: Not on file   • Number of children: Not on file   • Years of education: Not on file   • Highest education level: Not on file   Occupational History   • Not on file   Tobacco Use   • Smoking status: Current Every Day Smoker     Packs/day: 1.50     Years: 59.00     Pack years: 88.50     Types: Cigarettes   • Smokeless tobacco: Never Used   Substance and Sexual Activity   • Alcohol use: No     Alcohol/week: 0.0 oz   • Drug use: No   • Sexual activity: Never     Comment:  - 58 years   Other Topics Concern   • Not on file   Social History Narrative   • Not on file     Social Determinants of Health     Financial Resource Strain:    • Difficulty of Paying Living Expenses:     Food Insecurity: No Food Insecurity   • Worried About Running Out of Food in the Last Year: Never true   • Ran Out of Food in the Last Year: Never true   Transportation Needs: No Transportation Needs   • Lack of Transportation (Medical): No   • Lack of Transportation (Non-Medical): No   Physical Activity:    • Days of Exercise per Week:    • Minutes of Exercise per Session:    Stress:    • Feeling of Stress :    Social Connections:    • Frequency of Communication with Friends and Family:    • Frequency of Social Gatherings with Friends and Family:    • Attends Christian Services:    • Active Member of Clubs or Organizations:    • Attends Club or Organization Meetings:    • Marital Status:    Intimate Partner Violence:    • Fear of Current or Ex-Partner:    • Emotionally Abused:    • Physically Abused:    • Sexually Abused:      Allergies   Allergen Reactions   • Sulfa Drugs Unspecified     Unknown reaction       Outpatient Encounter Medications as of 2/22/2021   Medication Sig Dispense Refill   • lisinopril (PRINIVIL) 5 MG Tab Take 1 tablet by mouth 2 times a day. Pt states she changes dosing currently on 5mg BID 60 tablet 4   • spironolactone (ALDACTONE) 25 MG Tab Take 0.5 Tablets by mouth every day. 45 tablet 1   • alendronate (FOSAMAX) 70 MG Tab TAKE 1 TABLET BY MOUTH EVERY 7 DAYS. PT TO MAKE APPT PRIOR TO MORE REFILLS. 12 Tab 0   • metoprolol SR (TOPROL XL) 25 MG TABLET SR 24 HR Take 0.5 Tabs by mouth every day. 50 Tab 2   • atorvastatin (LIPITOR) 40 MG Tab Take 1 Tab by mouth every day. 30 Tab 5   • aspirin EC 81 MG EC tablet Take 1 Tab by mouth every day. 30 Tab 0   • Iron-Vitamins (GERITOL) Liquid Take 5 mL by mouth every day.     • ibuprofen (MOTRIN) 200 MG Tab Take 200 mg by mouth every 6 hours as needed for Mild Pain, Headache or Inflammation.     • meloxicam (MOBIC) 7.5 MG Tab TAKE 1 TABLET BY MOUTH EVERY DAY (Patient taking differently: Take 7.5 mg by mouth 1 time a day as needed for Moderate Pain or  "Inflammation.) 90 Tab 0   • CALCIUM PO Take 1 Tab by mouth every morning.     • Cholecalciferol (VITAMIN D3) 3000 UNITS Tab Take 3,000 Units by mouth every evening.     • meclizine (ANTIVERT) 25 MG TABS Take 1 Tab by mouth 3 times a day as needed. Indications: Sensation of Spinning or Whirling 30 Tab 0   • [DISCONTINUED] lisinopril (PRINIVIL) 5 MG Tab Take 5 mg by mouth every day.     • [DISCONTINUED] spironolactone (ALDACTONE) 25 MG Tab Take 1 tablet by mouth every day. (Patient not taking: Reported on 2/22/2021) 100 tablet 1   • [DISCONTINUED] lisinopril (PRINIVIL) 10 MG Tab Take 1 Tab by mouth every day. (Patient taking differently: Take 5 mg by mouth every day. Pt states she changes dosing currently on 5mg BID) 100 Tab 1     No facility-administered encounter medications on file as of 2/22/2021.     Review of Systems   Constitutional: Negative for fever, malaise/fatigue and weight loss.   Eyes: Negative for blurred vision.   Respiratory: Negative for cough and shortness of breath.    Cardiovascular: Negative for chest pain, palpitations, orthopnea, claudication, leg swelling and PND.   Genitourinary: Negative for dysuria, frequency and hematuria.   Musculoskeletal: Negative for falls and myalgias.   Neurological: Positive for dizziness. Negative for tingling and loss of consciousness.   Endo/Heme/Allergies: Does not bruise/bleed easily.        Objective:   BP (!) 162/74 (BP Location: Left arm, Patient Position: Sitting, BP Cuff Size: Adult)   Pulse 76   Ht 1.702 m (5' 7\")   Wt 48.1 kg (106 lb)   SpO2 97%   BMI 16.60 kg/m²       Physical Exam   Constitutional: She is oriented to person, place, and time. She appears well-developed.   HENT:   Head: Normocephalic and atraumatic.   Eyes: Pupils are equal, round, and reactive to light. EOM are normal.   Neck: No JVD present.   Cardiovascular: Normal rate, regular rhythm, normal heart sounds and intact distal pulses. Exam reveals no gallop and no friction rub. "   No murmur heard.  Pulmonary/Chest: Effort normal and breath sounds normal. No respiratory distress.   Abdominal: Soft. Bowel sounds are normal. She exhibits no distension.   Musculoskeletal:         General: No edema.   Neurological: She is alert and oriented to person, place, and time.   Skin: Skin is warm and dry. No erythema.   Cool BLE   Psychiatric: She has a normal mood and affect. Her behavior is normal.   Vitals reviewed.    Lab Results   Component Value Date/Time    CHOLSTRLTOT 134 07/15/2020 10:34 AM    LDL 60 07/15/2020 10:34 AM    HDL 61 07/15/2020 10:34 AM    TRIGLYCERIDE 64 07/15/2020 10:34 AM       Lab Results   Component Value Date/Time    SODIUM 137 01/21/2021 12:39 PM    POTASSIUM 4.2 01/21/2021 12:39 PM    CHLORIDE 101 01/21/2021 12:39 PM    CO2 27 01/21/2021 12:39 PM    GLUCOSE 77 01/21/2021 12:39 PM    BUN 20 01/21/2021 12:39 PM    CREATININE 0.76 01/21/2021 12:39 PM     Lab Results   Component Value Date/Time    ALKPHOSPHAT 98 12/16/2020 12:38 PM    ASTSGOT 15 12/16/2020 12:38 PM    ALTSGPT 12 12/16/2020 12:38 PM    TBILIRUBIN 1.7 (H) 12/16/2020 12:38 PM     EKG (12/16/2020): Sinus rhythm with PACs and LBBB    Echo (12/16/2020): Severely reduced left ventricular systolic function.  Left ventricular ejection fraction is visually estimated to be 30%.  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  Otherwise global hypokinesis.  Grade I diastolic dysfunction.  Mild concentric left ventricular hypertrophy.  The right ventricle was normal in size and function.  No significant valvular regurgitation or stenosis.   Right atrial pressure is estimated to be 3 mmHg.  Estimated right ventricular systolic pressure is 39 mmHg.  No prior study is available for comparison.      NM Cardiac Stress Test (12/17/2020):    No evidence of significant jeopardized viable myocardium or prior myocardial    infarction.   LV EF is 47% with diffuse hypokinesis and dyskinesis in the inferoseptal     myocardium. Previous EF by ECHO is 30%. Discrepancy is likely artifactual.    Please refer to ECHO as a more accurate estimate.   ECG INTERPRETATION   Nondiagnostic ECG portion of a regadenoson nuclear stress test.  Assessment:     1. Stage B ACC/AHA asymptomatic heart failure (HCC)  spironolactone (ALDACTONE) 25 MG Tab   2. Coronary artery disease involving native coronary artery of native heart without angina pectoris     3. Essential hypertension  spironolactone (ALDACTONE) 25 MG Tab   4. Vitamin D deficiency     5. LBBB (left bundle branch block)     6. Dyslipidemia     7. High risk medication use         Medical Decision Making:  Today's Assessment / Status / Plan:   HFrEF, Stage B, Class I-II, LVEF 30%  -Heart failure due to prior infarct or toxin induced.  -Continue lisinopril 5 mg twice a day.   -Continue Metoprolol XL 12.5 mg daily. Recommend trail at night to improve symptoms.  -Decrease Spironolactone to 12.5 mg daily   -Repeat echo prior to next appointment,   -Reinforced s/sx of worsening heart failure with patient and weight monitoring. Pt verbalizes understanding. Pt to call office or RTC if present.    -PUMP line number [phone] (PUMP)     LBBB  -. No indication for CRT at this time. Will continue to monitor.    Hypertension  -Today in office blood pressure is controlled with home -134/70-80's. We will decrease her medication d/t symptoms with close follow-up and assistance of pharmacotherapy clinic. Medication recommendations per above.    CAD  -Stress test showed prior, completed, inferior basal infarct  -Pt denies chest pain  -Secondary prevention with ASA 81 mg and atorvastatin 40 mg daily       High Risk Medication Use  -This includes monitoring the dose adjustments with lisinopril and addition of spironolactone. Discussed BMP results. Discussed close monitoring with patient. Such as checking BP with dizziness.     Medications refilled. FU in clinic in 2 months. Sooner if  needed.    Patient verbalizes understanding and agrees with the plan of care.     Collaborating MD:Dr. Stan MD    PLEASE NOTE: This Note was created using voice recognition Software. I have made every reasonable attempt to correct obvious errors, but I expect that there are errors of grammar and possibly content that I did not discover before finalizing the note

## 2021-03-09 NOTE — TELEPHONE ENCOUNTER
Pt called to report that she did not have any transportation today.  Pt was requesting a virtual visit which we are unable to provide.  Pt reports BPs 145/70 ,119/92, 150/64, 133/80 129/70 135/81    Will call pt back to reschedule. (her son started at [facility] today and does not have his schedule yet)    Wendy Guevara, Clinical Pharmacist, CDE, CACP

## 2021-03-16 NOTE — TELEPHONE ENCOUNTER
Pt cancelled her CHF visit secondary to lack of transportation.  Called to reschedule    Pt does not know when her son can bring her.  Will try again next week    Wendy Guevara, Clinical Pharmacist, CDE, CACP

## 2021-03-22 NOTE — TELEPHONE ENCOUNTER
[facility]    Called and spoke to the patient today and is unwilling to schedule due to transportation issues.  She requests a virtual visit which is not available at this time.   Will try her again next week.       Jayda EsquivelD

## 2021-04-15 NOTE — NON-PROVIDER
CHF Pharmacotherapy visit - Visit  Date of Service: 04/15/2021  Informed written consent was given on: 2/16/21    Mayra Veloz is here for CHF       HPI  Pertinent Interval History since last visit:   Pt is new to our services. She was diagnosed with HFrEF in December 2020. She has been having issues with dizziness and has been hypertensive.  Most recent EF:  Recent ECHO showed improvement in EF from 30% to 50%  Changes to laboratory values since last visit:  Pending new labs  Current CHF Medications - including dose:   Entresto or ACE/ARB:Lisinopril 5 mg BID - sometimes takes 2.5 mg if she feels dizzy  Beta blocker: Toprol XL 25 mg tab - 1/2 tab po daily  Diuretic:None  Aldosterone antagonist: Spironolactone 12.5 mg daily    Changes to weight since last visit:   Lost 2 lbs since last visit  Home BP:  Averaging 130/80    Current Adherence to CHF Therapies:  Complete    SOCIAL HISTORY  Social History     Tobacco Use   Smoking Status Current Every Day Smoker   • Packs/day: 1.50   • Years: 59.00   • Pack years: 88.50   • Types: Cigarettes   Smokeless Tobacco Never Used      Change in weight: Lost 2 lbs since last visit  Exercise habits: Pt walks as often as she can every day   Diet: low sodium      DATA REVIEW  Most Recent CMP Panel:   Lab Results   Component Value Date    SODIUM 137 01/21/2021    POTASSIUM 4.2 01/21/2021    CHLORIDE 101 01/21/2021    CO2 27 01/21/2021    ANION 9.0 01/21/2021    GLUCOSE 77 01/21/2021    BUN 20 01/21/2021    CREATININE 0.76 01/21/2021    CALCIUM 9.6 01/21/2021    ASTSGOT 15 12/16/2020    ALTSGPT 12 12/16/2020    ALKPHOSPHAT 98 12/16/2020    TBILIRUBIN 1.7 (H) 12/16/2020    ALBUMIN 5.1 (H) 12/16/2020    AGRATIO 1.6 12/16/2020       Renal function:  CrCl 44 ml/min    ASSESSMENT AND PLAN  Lifestyle Recommendations From Today's Visit:   Continue exercising as tolerated - aim to walk every day  Continue to restrict daily sodium intake to 2 gm      Recommendations for Other  Cardiovascular Risk Factors, ricky all that apply:   Smoking- Pt still smokes 1 cigarette/day. She is not willing to quit at this time. Encouraged pt to continue reducing tobacco use    Resulting CHF medications today (changes are bolded)  Entresto or ACE/ARB:Lisinopril 10 mg daily  Beta blocker: Toprol XL 25 mg tab - 1/2 tab po daily  Diuretic:None  Aldosterone antagonist: Spironolactone 12.5 mg daily    Pt was not able to tolerate spironolactone 25 mg daily and was experiencing dizziness, so she has cut the dose in half to 12.5 mg daily.     Pt's BP at home has been mostly within goal, however she still has hypertensive readings. Her DBP today in clinic was high. Pt has been struggling with dizziness from BP medications vs HTN. Will not make any changes as increasing BP medications worsens her dizziness and her home BP readings have been mostly controlled.    Studies Ordered at Todays Visit:  None   Blood Work Ordered At Today's visit:   BMP  Follow-Up:   6 weeks    Melly Cruz, PharmD    CC:  COREY Marsh, MARTA Bronson*    Medications reconciled  Flow sheets updated

## 2021-04-19 NOTE — PROGRESS NOTES
Chief Complaint   Patient presents with   • Arrhythmia   • Coronary Artery Disease     F/V DX:Coronary artery disease involving native coronary artery of native heart without angina pectoris   • HTN (Controlled)   • Shortness of Breath       Subjective:   Vargas Veloz is a 80 y.o. female who presents today as a follow-up from her hospitalization for COPD stage B heart failure hypertension lower extremity edema.  Since he was last seen she is doing well.  Blood pressure control.  Lower extremity edema is at goal.  She has no chest pain.  Most recent labs were normal.  She has some labs coming up for her primary care physician.    Past Medical History:   Diagnosis Date   • Dyslipidemia 2/15/2013   • Osteoarthritis 2/6/2013   • Osteoporosis    • Thrombocytopenia (HCC) 2/15/2013     Past Surgical History:   Procedure Laterality Date   • ORIF, HUMERUS      age 9   • TUBAL COAGULATION LAPAROSCOPIC BILATERAL       Family History   Problem Relation Age of Onset   • Heart Disease Mother         MI   • Hypertension Mother    • Cancer Father         colon   • Lung Disease Father         COPD   • Heart Disease Sister         pacer placed   • Stroke Brother 50   • Heart Disease Brother 60        pace maker   • Other Brother         Vertigo   • Other Son         Back issues work related   • No Known Problems Son    • No Known Problems Son    • Lung Disease Daughter         COPD   • Other Grandson         Kidney and liver failure   • Alcohol abuse Grandson      Social History     Socioeconomic History   • Marital status:      Spouse name: Not on file   • Number of children: Not on file   • Years of education: Not on file   • Highest education level: Not on file   Occupational History   • Not on file   Tobacco Use   • Smoking status: Current Every Day Smoker     Packs/day: 1.00     Years: 59.00     Pack years: 59.00     Types: Cigarettes   • Smokeless tobacco: Never Used   Substance and Sexual Activity   • Alcohol use: No      Alcohol/week: 0.0 oz   • Drug use: No   • Sexual activity: Never     Comment:  - 57 years   Other Topics Concern   • Not on file   Social History Narrative   • Not on file     Social Determinants of Health     Financial Resource Strain:    • Difficulty of Paying Living Expenses:    Food Insecurity: No Food Insecurity   • Worried About Running Out of Food in the Last Year: Never true   • Ran Out of Food in the Last Year: Never true   Transportation Needs: No Transportation Needs   • Lack of Transportation (Medical): No   • Lack of Transportation (Non-Medical): No   Physical Activity:    • Days of Exercise per Week:    • Minutes of Exercise per Session:    Stress:    • Feeling of Stress :    Social Connections:    • Frequency of Communication with Friends and Family:    • Frequency of Social Gatherings with Friends and Family:    • Attends Jehov[employer] witness Services:    • Active Member of Clubs or Organizations:    • Attends Club or Organization Meetings:    • Marital Status:    Intimate Partner Violence:    • Fear of Current or Ex-Partner:    • Emotionally Abused:    • Physically Abused:    • Sexually Abused:      Allergies   Allergen Reactions   • Sulfa Drugs Unspecified     Unknown reaction       Outpatient Encounter Medications as of 4/19/2021   Medication Sig Dispense Refill   • lisinopril (PRINIVIL) 5 MG Tab Take 1 tablet by mouth 2 times a day. Pt states she changes dosing currently on 5mg BID (Patient taking differently: Take 2.5-5 mg by mouth 2 times a day. Pt states she changes dosing currently on 5mg BID; she sometimes takes 2.5 mg if she feels dizzy) 60 tablet 4   • spironolactone (ALDACTONE) 25 MG Tab Take 0.5 Tablets by mouth every day. 45 tablet 1   • metoprolol SR (TOPROL XL) 25 MG TABLET SR 24 HR Take 0.5 Tabs by mouth every day. 50 Tab 2   • atorvastatin (LIPITOR) 40 MG Tab Take 1 Tab by mouth every day. 30 Tab 5   • aspirin EC 81 MG EC tablet Take 1 Tab by mouth every day. 30 Tab 0   • Iron-Vitamins  "(GERITOL) Liquid Take 5 mL by mouth every day.     • ibuprofen (MOTRIN) 200 MG Tab Take 200 mg by mouth every 6 hours as needed for Mild Pain, Headache or Inflammation.     • CALCIUM PO Take 1 Tab by mouth every morning.     • Cholecalciferol (VITAMIN D3) 3000 UNITS Tab Take 3,000 Units by mouth every evening.     • meclizine (ANTIVERT) 25 MG TABS Take 1 Tab by mouth 3 times a day as needed. Indications: Sensation of Spinning or Whirling 30 Tab 0   • [DISCONTINUED] lisinopril (PRINIVIL) 10 MG Tab      • alendronate (FOSAMAX) 70 MG Tab TAKE 1 TABLET BY MOUTH EVERY 7 DAYS. PT TO MAKE APPT PRIOR TO MORE REFILLS. (Patient not taking: Reported on 4/19/2021) 12 Tab 0     No facility-administered encounter medications on file as of 4/19/2021.     Review of Systems   Constitutional: Negative.  Negative for chills, fever and malaise/fatigue.   HENT: Negative.  Negative for sore throat.    Eyes: Negative.    Respiratory: Negative.  Negative for cough, hemoptysis, sputum production, shortness of breath, wheezing and stridor.    Cardiovascular: Negative.  Negative for chest pain, palpitations, orthopnea, claudication, leg swelling and PND.   Gastrointestinal: Negative.    Genitourinary: Negative.    Musculoskeletal: Negative.    Skin: Negative.    Neurological: Negative.  Negative for dizziness, loss of consciousness and weakness.   Endo/Heme/Allergies: Negative.  Does not bruise/bleed easily.   All other systems reviewed and are negative.       Objective:   /80 (BP Location: Left arm, Patient Position: Sitting, BP Cuff Size: Adult)   Pulse 87   Ht 1.702 m (5' 7\")   Wt 49.4 kg (109 lb)   SpO2 96%   BMI 17.07 kg/m²     Physical Exam   Constitutional: She appears well-developed and well-nourished. No distress.   HENT:   Head: Normocephalic and atraumatic.   Right Ear: External ear normal.   Left Ear: External ear normal.   Nose: Nose normal.   Mouth/Throat: No oropharyngeal exudate.   Eyes: Pupils are equal, round, and " reactive to light. Conjunctivae and EOM are normal. Right eye exhibits no discharge. Left eye exhibits no discharge. No scleral icterus.   Neck: No JVD present.   Cardiovascular: Normal rate, regular rhythm and intact distal pulses. Exam reveals no gallop and no friction rub.   No murmur heard.  Pulmonary/Chest: Effort normal. No stridor. No respiratory distress. She has no wheezes. She has no rales. She exhibits no tenderness.   Abdominal: Soft. She exhibits no distension. There is no guarding.   Musculoskeletal:         General: No tenderness, deformity or edema. Normal range of motion.      Cervical back: Neck supple.   Neurological: She is alert. She has normal reflexes. She displays normal reflexes. No cranial nerve deficit. She exhibits normal muscle tone. Coordination normal.   Skin: Skin is warm and dry. No rash noted. She is not diaphoretic. No erythema. No pallor.   Psychiatric: She has a normal mood and affect. Her behavior is normal. Judgment and thought content normal.   Nursing note and vitals reviewed.      Assessment:     1. Tobacco dependence     2. LBBB (left bundle branch block)  CBC W/ DIFF W/O PLATELETS    Comp Metabolic Panel   3. Dyslipidemia  CBC W/ DIFF W/O PLATELETS    Comp Metabolic Panel   4. Coronary artery disease involving native coronary artery of native heart without angina pectoris  CBC W/ DIFF W/O PLATELETS    Comp Metabolic Panel   5. Cardiac arrhythmia, unspecified cardiac arrhythmia type  CBC W/ DIFF W/O PLATELETS    Comp Metabolic Panel   6. SOB (shortness of breath)  proBrain Natriuretic Peptide, NT   7. Stage B ACC/AHA asymptomatic heart failure (HCC)  proBrain Natriuretic Peptide, NT   8. Thrombocytopenia (HCC)  proBrain Natriuretic Peptide, NT   9. Tobacco abuse         Medical Decision Making:  Today's Assessment / Status / Plan:     80-year-old female with stage B heart failure.  I will repeat a number of labs including the BNP.  At this point I think she is doing well.   She continues to smoke.  She is precontemplative.  We did spend about 5 minutes discussing this.  I will see her back in 6 months.

## 2021-05-07 NOTE — TELEPHONE ENCOUNTER
Jadiel Carlson M.D.  Michael Christine R.N.   Please have her get a repeat B MP in 1 week to follow-up on her sodium level.     S/W pt, she is aware

## 2021-06-03 NOTE — TELEPHONE ENCOUNTER
RO    Pt called and states that she has been dizzy for the last 3 weeks and she is not sure if its due to the medication. Pt had appt today 6/3 with the pharmacist and asked to cancel due to dizziness. Please call pt back to further discuss at [phone].    Thank you

## 2021-06-03 NOTE — TELEPHONE ENCOUNTER
"Returned call. Pt reports some dizziness recently, no other symptoms. Recent /61, 123/81, 145/79. HR 76, 70, 75. Pt is unsure how much water she drinks a day. She states she feels every time the weather is \"good\" she starts to feel some slight dizziness. Advised her to start keeping track of her water intake and drink about 8-8 ounce glasses of water a day, as dizziness might be related to dehydration. Asked her to try this for a few days and see if symptoms improve. Advised that she call us if dizziness worsens or does not improve and also advised that she change positions slowly. Asked her to reschedule her pharm appt when she feels better, pt stated she has no one to drive her so she canceled today's appt d/t dizziness.   "

## 2021-06-08 NOTE — TELEPHONE ENCOUNTER
Spoke with Vargas, who does not wish to establish care with our service.    CC. Cassy Guevara, Clinical Pharmacist, CDE, CACP

## 2021-06-14 NOTE — PROGRESS NOTES
Patient Call - Dizziness  Received: Today  Brittney MORALES. Jasmin  P Amb Anticoag Pool  Pt LVM stating that she's been feeling dizzy lately and unsure if it's from the medications she's currently taking. She said she's feeling as dizzy as she did when she went into the E.R. 6 months ago.     S/w pt - she checked her BP and it was 87/54. Urged pt to be evaluated in the ER.  Of note- pt declined further f/u in our CHF pharmacotherapy clinic per note on 6/8. Pt does not wish to reschedule f/u at this time.    Melly Cruz, PharmD

## 2021-09-08 PROBLEM — E87.1 HYPONATREMIA: Status: ACTIVE | Noted: 2021-01-01

## 2021-09-08 PROBLEM — R55 SYNCOPE: Status: ACTIVE | Noted: 2021-01-01

## 2021-09-08 NOTE — ED NOTES
Med rec complete per pt.       Medication Sig   • lisinopril (PRINIVIL) 2.5 MG Tab Take 2.5 mg by mouth every morning.   • atorvastatin (LIPITOR) 40 MG Tab Take 40 mg by mouth every day   • spironolactone (ALDACTONE) 25 MG Tab Take 0.5 Tablets by mouth every day.   • metoprolol SR (TOPROL XL) 25 MG TABLET SR 24 HR Take 0.5 Tabs by mouth every day.   • aspirin EC 81 MG EC tablet Take 1 Tab by mouth every day.   • CALCIUM PO Take 1 Tab by mouth every morning.   • Cholecalciferol (VITAMIN D3) 3000 UNITS Tab Take 3,000 Units by mouth every evening.

## 2021-09-08 NOTE — ED TRIAGE NOTES
"Chief Complaint   Patient presents with   • Syncope     BIB EMS from [facility] pt was buying groceries and had syncope, +LOC, pt fell and has skin tear to left arm.  pt reports dizziness all summer reports waking up on the floor     Pt AAOX4. Denies blood thinners.   /56   Pulse 75   Temp 36.7 °C (98 °F) (Temporal)   Resp 18   Ht 1.702 m (5' 7\")   Wt 46.7 kg (103 lb)   SpO2 98%   Pt informed of wait times. Educated on triage process.  Asked to return to triage RN for any new or worsening of symptoms. Thanked for patience.        "
.

## 2021-09-08 NOTE — ED PROVIDER NOTES
"ED Provider Note    Scribed for Josef Hayes M.D. by Mica Lockett. 9/8/2021, 4:01 PM.    Primary care provider: Pcp Pt States None  Means of arrival: EMS  History obtained from: patient  History limited by: none    CHIEF COMPLAINT  Chief Complaint   Patient presents with   • Syncope     BIB EMS from [facility] pt was buying groceries and had syncope, +LOC, pt fell and has skin tear to left arm.  pt reports dizziness all summer reports waking up on the floor       HPI  Mayra Veloz is a 81 y.o. female who presents to the Emergency Department following a syncope episode. This afternoon she was out shopping when she experienced a syncope episode. Mayra says that she was feeling dizzy prior to the syncope episode. She notes that she has been dizzy for about a month now. She admits to associated symptoms of loss of consciousness , nausea and vomiting onset last night and large skin tear on left arm secondary to fall. She denies pain, short or breath, fever, chills, cough,  chest pain, or abdominal pain. No alleviating or exacerbating  factors were reported. She states that she has a history of heart problems. She denies receiving her COVID vaccination because she's been \"too dizzy\". She is unsure when her last tetanus shot was.     REVIEW OF SYSTEMS  Review of Systems   Constitutional: Negative for chills and fever.   Respiratory: Negative for cough and shortness of breath.    Cardiovascular: Negative for chest pain.   Gastrointestinal: Positive for nausea and vomiting. Negative for abdominal pain.   Neurological: Positive for dizziness and loss of consciousness.   All other systems reviewed and are negative.      PAST MEDICAL HISTORY   has a past medical history of Dyslipidemia (2/15/2013), Osteoarthritis (2/6/2013), Osteoporosis, and Thrombocytopenia (HCC) (2/15/2013).    SURGICAL HISTORY   has a past surgical history that includes orif, humerus and tubal coagulation laparoscopic bilateral.    SOCIAL " "HISTORY  Social History     Tobacco Use   • Smoking status: Current Every Day Smoker     Packs/day: 1.00     Years: 59.00     Pack years: 59.00     Types: Cigarettes   • Smokeless tobacco: Never Used   Vaping Use   • Vaping Use: Never used   Substance Use Topics   • Alcohol use: No     Alcohol/week: 0.0 oz   • Drug use: No      Social History     Substance and Sexual Activity   Drug Use No       FAMILY HISTORY  Family History   Problem Relation Age of Onset   • Heart Disease Mother         MI   • Hypertension Mother    • Cancer Father         colon   • Lung Disease Father         COPD   • Heart Disease Sister         pacer placed   • Stroke Brother 50   • Heart Disease Brother 60        pace maker   • Other Brother         Vertigo   • Other Son         Back issues work related   • No Known Problems Son    • No Known Problems Son    • Lung Disease Daughter         COPD   • Other Grandson         Kidney and liver failure   • Alcohol abuse Grandson        CURRENT MEDICATIONS  Home Medications     Reviewed by Pa Horan (Pharmacy Tech) on 09/08/21 at 1642  Med List Status: Complete   Medication Last Dose Status   aspirin EC 81 MG EC tablet 9/8/2021 Active   atorvastatin (LIPITOR) 40 MG Tab 9/7/2021 Active   CALCIUM PO 9/8/2021 Active   Cholecalciferol (VITAMIN D3) 3000 UNITS Tab 9/7/2021 Active   lisinopril (PRINIVIL) 2.5 MG Tab 9/8/2021 Active   metoprolol SR (TOPROL XL) 25 MG TABLET SR 24 HR 9/8/2021 Active   spironolactone (ALDACTONE) 25 MG Tab 9/8/2021 Active                ALLERGIES  Allergies   Allergen Reactions   • Sulfa Drugs Unspecified     Unknown reaction         PHYSICAL EXAM  VITAL SIGNS: /56   Pulse 75   Temp 36.7 °C (98 °F) (Temporal)   Resp 18   Ht 1.702 m (5' 7\")   Wt 46.7 kg (103 lb)   SpO2 98%   BMI 16.13 kg/m²   Vitals reviewed.  Constitutional: Awake and Alert, Well developed, Well nourished, No acute distress, Non-toxic appearance.   HENT: Normocephalic, Atraumatic, Bilateral " external ears normal, Oropharynx moist, No oral exudates, Nose normal.   Eyes: PERRL, EOMI, Conjunctiva normal, No discharge.   Neck: Normal range of motion, No tenderness, Supple, No stridor.   Cardiovascular: Normal heart rate, Normal rhythm, No murmurs, No rubs, No gallops.   Thorax & Lungs: Normal breath sounds, No respiratory distress, No wheezing, No chest tenderness.   Abdomen: Bowel sounds normal, Soft, No tenderness  Skin: Warm, Dry, No erythema, No rash. Large skin tear on left foream arm   Back: No tenderness, No CVA tenderness.   Musculoskeletal: Good range of motion in all major joints.  Neurologic: Alert,No focal deficits noted.   Psychiatric: Affect normal    LABS  Results for orders placed or performed during the hospital encounter of 09/08/21   CBC with Differential   Result Value Ref Range    WBC 5.9 4.8 - 10.8 K/uL    RBC 4.49 4.20 - 5.40 M/uL    Hemoglobin 13.5 12.0 - 16.0 g/dL    Hematocrit 41.0 37.0 - 47.0 %    MCV 91.3 81.4 - 97.8 fL    MCH 30.1 27.0 - 33.0 pg    MCHC 32.9 (L) 33.6 - 35.0 g/dL    RDW 47.3 35.9 - 50.0 fL    Platelet Count 101 (L) 164 - 446 K/uL    MPV 12.7 9.0 - 12.9 fL    Neutrophils-Polys 80.90 (H) 44.00 - 72.00 %    Lymphocytes 5.40 (L) 22.00 - 41.00 %    Monocytes 13.00 0.00 - 13.40 %    Eosinophils 0.00 0.00 - 6.90 %    Basophils 0.20 0.00 - 1.80 %    Immature Granulocytes 0.50 0.00 - 0.90 %    Nucleated RBC 0.00 /100 WBC    Neutrophils (Absolute) 4.80 2.00 - 7.15 K/uL    Lymphs (Absolute) 0.32 (L) 1.00 - 4.80 K/uL    Monos (Absolute) 0.77 0.00 - 0.85 K/uL    Eos (Absolute) 0.00 0.00 - 0.51 K/uL    Baso (Absolute) 0.01 0.00 - 0.12 K/uL    Immature Granulocytes (abs) 0.03 0.00 - 0.11 K/uL    NRBC (Absolute) 0.00 K/uL   Complete Metabolic Panel (CMP)   Result Value Ref Range    Sodium 133 (L) 135 - 145 mmol/L    Potassium 4.1 3.6 - 5.5 mmol/L    Chloride 98 96 - 112 mmol/L    Co2 21 20 - 33 mmol/L    Anion Gap 14.0 7.0 - 16.0    Glucose 106 (H) 65 - 99 mg/dL    Bun 22 8 - 22  mg/dL    Creatinine 1.10 0.50 - 1.40 mg/dL    Calcium 9.1 8.5 - 10.5 mg/dL    AST(SGOT) 20 12 - 45 U/L    ALT(SGPT) 19 2 - 50 U/L    Alkaline Phosphatase 70 30 - 99 U/L    Total Bilirubin 1.8 (H) 0.1 - 1.5 mg/dL    Albumin 4.1 3.2 - 4.9 g/dL    Total Protein 6.5 6.0 - 8.2 g/dL    Globulin 2.4 1.9 - 3.5 g/dL    A-G Ratio 1.7 g/dL   Troponin   Result Value Ref Range    Troponin T 18 6 - 19 ng/L   ESTIMATED GFR   Result Value Ref Range    GFR If  58 (A) >60 mL/min/1.73 m 2    GFR If Non  48 (A) >60 mL/min/1.73 m 2   MAGNESIUM   Result Value Ref Range    Magnesium 1.9 1.5 - 2.5 mg/dL   EKG   Result Value Ref Range    Report       [facility] Emergency Dept.    Test Date:  2021  Pt Name:    TJ LEIVA               Department: ER  MRN:        [id number]                      Room:        18  Gender:     Female                       Technician: [id number]  :        [id number]                   Requested By:ER TRIAGE PROTOCOL  Order #:    [id number]                    Reading MD: BEBETO BATES. SILVERIO    Measurements  Intervals                                Axis  Rate:       70                           P:          70  NH:         168                          QRS:        -41  QRSD:       128                          T:          99  QT:         444  QTc:        480    Interpretive Statements  SINUS RHYTHM  MULTIPLE PREMATURE COMPLEXES, VENT & SUPRAVEN  LEFT BUNDLE BRANCH BLOCK  Compared to ECG 2020 11:45:51  Atrial fibrillation no longer present  Electronically Signed On 2021 16:11:01 PDT by BEBETO BATES. SILVERIO         All labs reviewed by me.    EKG Interpretation  Interpreted by me.     RADIOLOGY  CT-HEAD W/O   Final Result      1. No acute intracranial abnormality.   2. Chronic microvascular ischemic type changes and age-related atrophy.      DX-CHEST-PORTABLE (1 VIEW)   Final Result      No evidence of acute cardiopulmonary process.        The  radiologist's interpretation of all radiological studies have been reviewed by me.    COURSE & MEDICAL DECISION MAKING  Pertinent Labs & Imaging studies reviewed. (See chart for details)    Obtained and reviewed past medical records from previous visit for baseline lab and previous work-up for comparison.    4:01 PM Patient seen and examined at bedside. The patient presents with dizziness following a syncope episode, and the differential diagnosis includes but is not limited to to the emergency department complaining of a syncopal episode.  Differential diagnosis includes but is not limited to cardiac arrhythmia, anemia, dehydration, valvular heart disease.. Ordered for CBC with diff, CMP, Troponin, Estimated GRF, DX-Chest, CT-Head, and an EKG  to evaluate. Patient will be treated with Adacel 0.5 mL injection for her symptoms.        Patient is skin tears on the left upper extremity to be treated with a dressing and a tetanus shot.    Patient is an abnormal EKG and a history of congestive heart failure by previous echocardiogram.  Per [place] syncope rules she is high risk and therefore requires hospitalization for evaluation of her syncope.  Hospitalist is paged    5:41 PM Paged Hospitalist.     5:44 PM I discussed the patient's case and the above findings with Dr. Martins (Hospitalist) who agrees to evaluate and hospitalize the patient.      DISPOSITION:  Patient will be hospitalized by Dr. Martins in guarded condition.    FINAL IMPRESSION  1. Syncope, unspecified syncope type    2. Dizziness          Mica RICHARD (Antonia), am scribing for, and in the presence of, Josef Hayes M.D..    Electronically signed by: Mica Lockett (Antonia), 9/8/2021    Josef RICHARD M.D. personally performed the services described in this documentation, as scribed by Mica Lockett in my presence, and it is both accurate and complete.    The note accurately reflects work and decisions made by me.  Josef Hayes  M.D.  9/8/2021  7:19 PM

## 2021-09-09 PROBLEM — I49.9 CARDIAC ARRHYTHMIA: Status: RESOLVED | Noted: 2020-01-01 | Resolved: 2021-01-01

## 2021-09-09 NOTE — THERAPY
Physical Therapy   Initial Evaluation     Patient Name: Mayra Veloz  Age:  81 y.o., Sex:  female  Medical Record #: [id number]  Today's Date: 9/9/2021     Precautions  Precautions: Fall Risk    Assessment  Patient is 81 y.o. female admitted w/ c/o vertigo for several months.  Hx of HF and tobacco use.  CT head negative.  She lives alone in a mobile home, where she was mobile w/o AD.  Son lives close by and can assist.  Today, she is able to move to/from the eob w/o assist.  Able to stand and ambulate 200 ft w/o AD and w/o need of assist.  She does not c/o dizziness.  Declines the need to perform stairs prior to d/c, stating they wont be an issue.  PT will not formally follow, for d/c needs only.    Plan    Recommend Physical Therapy for Evaluation only  DC Equipment Recommendations: None  Discharge Recommendations: Anticipate that the patient will have no further physical therapy needs after discharge from the hospital         Objective       09/09/21 0755   Prior Living Situation   Housing / Facility Mobile Home   Steps Into Home 4   Steps In Home 0   Equipment Owned None   Lives with - Patient's Self Care Capacity Alone and Able to Care For Self   Prior Level of Functional Mobility   Bed Mobility Independent   Transfer Status Independent   Ambulation Independent   Assistive Devices Used None   Stairs Independent   History of Falls   History of Falls Yes   Date of Last Fall 09/08/21   Strength Lower Body   Comments no fxl deficits   Balance Assessment   Sitting Balance (Static) Fair +   Sitting Balance (Dynamic) Fair +   Standing Balance (Static) Fair +   Standing Balance (Dynamic) Fair +   Weight Shift Sitting Good   Weight Shift Standing Good   Gait Analysis   Gait Level Of Assist Supervised   Assistive Device None   Distance (Feet) 200   Bed Mobility    Supine to Sit Supervised   Sit to Supine Supervised   Scooting Supervised   Functional Mobility   Sit to Stand Supervised   Bed, Chair, Wheelchair Transfer  Supervised   Anticipated Discharge Equipment and Recommendations   DC Equipment Recommendations None   Discharge Recommendations Anticipate that the patient will have no further physical therapy needs after discharge from the hospital

## 2021-09-09 NOTE — PROGRESS NOTES
Pt brought to floor by this RN and Zoll. Pt alert and oriented. VSS on arrival. Pt able to ambulate with standbye assist to bed. Pt has unsteady gait due to dizziness. Multiple skin tears noted to left arm from fall before admission. Admission completed. Safety/fall precautions, call bell use and POC explained to pt.

## 2021-09-09 NOTE — DISCHARGE PLANNING
Pending supervisory review to access if patient will be safe at home until 9/14 for md stone. Also [place] [facility] [place] if [place] would be able to see patient sooner.       Thank you    [phone]

## 2021-09-09 NOTE — ASSESSMENT & PLAN NOTE
Fall precautions  CT head negative for any intra or acute abnormalities  Twelve-lead EKG reveals premature complexes and otherwise sinus rhythm  Telemetry monitoring  Orthostatic vital signs  Last echocardiogram April 2021 reveals improved ejection fraction at 50% and mild mitral regurgitation  No indication for carotid Doppler at this time  D-dimer ordered to rule out DVT/PE

## 2021-09-09 NOTE — DISCHARGE PLANNING
Received Choice form at 5629  Agency/Facility Name: [facility]  Referral sent per Choice form @ 5733

## 2021-09-09 NOTE — ASSESSMENT & PLAN NOTE
Premature complexes appreciate EKG and at bedside.  Cardiac monitoring overnight  Consider restarting metoprolol once active vertigo resolves or improves

## 2021-09-09 NOTE — ASSESSMENT & PLAN NOTE
Patient states that she quit smoking secondary to her vertigo and does not wish to continue at this time

## 2021-09-09 NOTE — DISCHARGE INSTRUCTIONS
Discharge Instructions per JEFFREY Garcias.  Please follow up with your cardiologist within 2 weeks and establish care with a PCP.   Follow up with PCP regarding lung nodule, recommended follow up in 6 months.       DIET: healthy    ACTIVITY: as tolerated     DIAGNOSIS: ground level fall, syncope    Return to ER if you experience worsening symptoms, chest pain, shortness of breath, loss of consciousness, focal weakness, uncontrolled pain, or uncontrolled bleeding.     Discharge Instructions    Discharged to home by [facility] with self. Discharged via wheelchair, hospital escort: Yes.  Special equipment needed: Not Applicable    Be sure to schedule a follow-up appointment with your primary care doctor or any specialists as instructed.     Discharge Plan:   Diet Plan: Discussed  Activity Level: Discussed  Confirmed Follow up Appointment: Patient to Call and Schedule Appointment  Confirmed Symptoms Management: Discussed  Medication Reconciliation Updated: Yes    I understand that a diet low in cholesterol, fat, and sodium is recommended for good health. Unless I have been given specific instructions below for another diet, I accept this instruction as my diet prescription.   Other diet: Heart Healthy    Special Instructions: None    · Is patient discharged on Warfarin / Coumadin?   No     Depression / Suicide Risk    As you are discharged from this [facility], it is important to learn how to keep safe from harming yourself.    Recognize the warning signs:  · Abrupt changes in personality, positive or negative- including increase in energy   · Giving away possessions  · Change in eating patterns- significant weight changes-  positive or negative  · Change in sleeping patterns- unable to sleep or sleeping all the time   · Unwillingness or inability to communicate  · Depression  · Unusual sadness, discouragement and loneliness  · Talk of wanting to die  · Neglect of personal  appearance   · Rebelliousness- reckless behavior  · Withdrawal from people/activities they love  · Confusion- inability to concentrate     If you or a loved one observes any of these behaviors or has concerns about self-harm, here's what you can do:  · Talk about it- your feelings and reasons for harming yourself  · Remove any means that you might use to hurt yourself (examples: pills, rope, extension cords, firearm)  · Get professional help from the community (Mental Health, Substance Abuse, psychological counseling)  · Do not be alone:Call your Safe Contact- someone whom you trust who will be there for you.  · Call your local CRISIS HOTLINE 752-8061 or 082-065-2259  · Call your local Children's Mobile Crisis Response Team [place] (218) 377-3699 or www.[employer]  · Call the toll free National Suicide Prevention Hotlines   · National Suicide Prevention Lifeline 852-800-QRLL (0094)  · [employer] 800-SUICIDE (638-1458)          Syncope    Syncope refers to a condition in which a person temporarily loses consciousness. Syncope may also be called fainting or passing out. It is caused by a sudden decrease in blood flow to the brain. Even though most causes of syncope are not dangerous, syncope can be a sign of a serious medical problem. Your health care provider may do tests to find the reason why you are having syncope.  Signs that you may be about to faint include:  · Feeling dizzy or light-headed.  · Feeling nauseous.  · Seeing all white or all black in your field of vision.  · Having cold, clammy skin.  If you faint, get medical help right away. Call your local emergency services (911 in the [place] Do not drive yourself to the hospital.  Follow these instructions at home:  Pay attention to any changes in your symptoms. Take these actions to stay safe and to help relieve your symptoms:  Lifestyle  · Do not drive, use machinery, or play sports until your health care provider says it is  okay.  · Do not drink alcohol.  · Do not use any products that contain nicotine or tobacco, such as cigarettes and e-cigarettes. If you need help quitting, ask your health care provider.  · Drink enough fluid to keep your urine pale yellow.  General instructions  · Take over-the-counter and prescription medicines only as told by your health care provider.  · If you are taking blood pressure or heart medicine, get up slowly and take several minutes to sit and then stand. This can reduce dizziness or light-headedness.  · Have someone stay with you until you feel stable.  · If you start to feel like you might faint, lie down right away and raise (elevate) your feet above the level of your heart. Breathe deeply and steadily. Wait until all the symptoms have passed.  · Keep all follow-up visits as told by your health care provider. This is important.  Get help right away if you:  · Have a severe headache.  · Faint once or repeatedly.  · Have pain in your chest, abdomen, or back.  · Have a very fast or irregular heartbeat (palpitations).  · Have pain when you breathe.  · Are bleeding from your mouth or rectum, or you have black or tarry stool.  · Have a seizure.  · Are confused.  · Have trouble walking.  · Have severe weakness.  · Have vision problems.  These symptoms may represent a serious problem that is an emergency. Do not wait to see if your symptoms will go away. Get medical help right away. Call your local emergency services (911 in the [place] Do not drive yourself to the hospital.  Summary  · Syncope refers to a condition in which a person temporarily loses consciousness. It is caused by a sudden decrease in blood flow to the brain.  · Signs that you may be about to faint include dizziness, feeling light-headed, feeling nauseous, sudden vision changes, or cold, clammy skin.  · Although most causes of syncope are not dangerous, syncope can be a sign of a serious medical problem. If you faint, get medical help  right away.  This information is not intended to replace advice given to you by your health care provider. Make sure you discuss any questions you have with your health care provider.  Document Released: 12/18/2006 Document Revised: 11/30/2018 Document Reviewed: 11/26/2018  Elsevier Patient Education © 2020 [employer]

## 2021-09-09 NOTE — ED NOTES
Patient is resting comfortably, remains on monitoring, aware of pending testing. Call light within reach, denies needs. HOB elevated, blanket in place, cart in low, locked position with [id number] for safety.

## 2021-09-09 NOTE — ASSESSMENT & PLAN NOTE
We will hold off on any antihypertensive home medication therapy at this time  Orthostatic vital signs  Fall precautions

## 2021-09-09 NOTE — PROGRESS NOTES
4 Eyes Skin Assessment Completed by DANIEL Morrison and DANIEL Smith.    Head WDL  Ears WDL  Nose Redness  Mouth WDL  Neck WDL  Breast/Chest WDL  Shoulder Blades WDL  Spine WDL  (R) Arm/Elbow/Hand Redness and Abrasion  (L) Arm/Elbow/Hand WDL  Abdomen WDL  Groin Redness and Blanching  Scrotum/Coccyx/Buttocks Redness and Blanching  (R) Leg WDL  (L) Leg WDL  (R) Heel/Foot/Toe WDL  (L) Heel/Foot/Toe WDL          Devices In Places Tele Box and Pulse Ox      Interventions In Place N/A    Possible Skin Injury Yes    Pictures Uploaded Into Epic Yes  Wound Consult Placed N/A  RN Wound Prevention Protocol Ordered No

## 2021-09-09 NOTE — FACE TO FACE
Face to Face Supporting Documentation - Home Health    The encounter with this patient was in whole or in part the primary reason for home health admission.    Date of encounter:   Patient:                    MRN:                       YOB: 2020  Mayra Veloz  [id number]  1940     Home health to see patient for:  Skilled Nursing care for assessment, interventions & education and Physical Therapy evaluation and treatment    Skilled need for:  Comment: ground level fall, syncope,     Skilled nursing interventions to include:  Comment: PT, medication management    Homebound status evidenced by:  Needs the assistance of another person in order to leave the home. Leaving home requires a considerable and taxing effort. There is a normal inability to leave the home.    Community Physician to provide follow up care: Pcp Pt States None     Optional Interventions? No      I certify the face to face encounter for this home health care referral meets the CMS requirements and the encounter/clinical assessment with the patient was, in whole, or in part, for the medical condition(s) listed above, which is the primary reason for home health care. Based on my clinical findings: the service(s) are medically necessary, support the need for home health care, and the homebound criteria are met.  I certify that this patient has had a face to face encounter by myself.  Yesica Blue A.P.R.N. - NPI: [id number]

## 2021-09-09 NOTE — ED NOTES
Patient is resting comfortably, sleeping on cart with easy, unlabored respirs. Wakes easily, denies needs or questions. Pt son Arden updated via phone per pt request & consent @ [phone]. Pt denies needs, blankets in place, remains on monitoring, call light within reach.

## 2021-09-09 NOTE — PROGRESS NOTES
Report received from DANIEL Morrison.  Patient sitting up in bed watching TV.  POC gone over with pt and all questions answered.  Patient A & O  X 4.  No apparent signs of distress.  Safety precautions in place.  Patient educated to call for assistance.  Hourly rounding in place.

## 2021-09-09 NOTE — DISCHARGE PLANNING
Received Transport Form 9/9/21 @ 1042  Spoke to Sanya @ [facility]    Transport is scheduled for 9/9/21 @ [id number] going to Home.    Notified care team via Voalte of scheduled discharge.

## 2021-09-09 NOTE — DISCHARGE SUMMARY
Discharge Summary    CHIEF COMPLAINT ON ADMISSION  Chief Complaint   Patient presents with   • Syncope     BIB EMS from [facility] pt was buying groceries and had syncope, +LOC, pt fell and has skin tear to left arm.  pt reports dizziness all summer reports waking up on the floor       Reason for Admission  EMS B-18     Admission Date  9/8/2021    CODE STATUS  Full Code    HPI & HOSPITAL COURSE  This is a 81 y.o. female here with complaints of vertigo for the past few months.  Patient states that she was recently diagnosed last year with heart failure and has been compliant with all of her medications. She presented with complaints of vertigo twice in the past year and was determined to have inner ear otolith stone. She states that she takes meclizine as outpatient which does not have any relief of symptoms and states moving her head worsening symptoms. She does mention that she had diarrhea significantly last night and feels somewhat dehydrated which may have propagated her syncope. Of note, patient was on telemetry monitoring at bedside and noted to have arrhythmia with heart rate jumping as high as 180 bpm then going back into sinus rhythm, not captured on EKG or telemetry strips. No subsequent episodes. She denies history of aortic insufficiency, last echo 4/2021. D dimer elevated at 4.3, however CTA chest negative for PE. CT head without acute abnormalities. Patient reporting some improvement with symptoms and ambulating the connolly with physical therapy without issue. She does live alone at home and nearby son works long shifts. Referral placed for home health. Pt instructed to stay hydrated and follow up with cardiology and PCP.     Of note, pt found to have a 3.5mm pulmonary nodule in the right upper lobe. Given smoking history, recommended follow up in 6 months.      Therefore, she is discharged in fair and stable condition to home with organized home healthcare and close outpatient follow-up.    The patient  recovered much more quickly than anticipated on admission.    Discharge Date  9/9/21    FOLLOW UP ITEMS POST DISCHARGE  Follow up with cardiology  Pulmonary nodule follow up in 6 months    DISCHARGE DIAGNOSES  Principal Problem:    Syncope POA: Yes  Active Problems:    Thrombocytopenia (HCC) POA: Yes    Tobacco abuse POA: Yes    Essential hypertension POA: Yes    Hyponatremia POA: Yes  Resolved Problems:    Cardiac arrhythmia POA: Yes      FOLLOW UP  Future Appointments   Date Time Provider Department Center   9/14/2021 12:40 PM [facility] [place]     Jadiel Carlson M.D.  [address]  [address]  [facility] [address]  [phone]    Schedule an appointment as soon as possible for a visit in 1 week        MEDICATIONS ON DISCHARGE     Medication List      CHANGE how you take these medications      Instructions   atorvastatin 40 MG Tabs  What changed: when to take this  Commonly known as: LIPITOR   TAKE 1 TABLET BY MOUTH EVERY DAY        CONTINUE taking these medications      Instructions   aspirin 81 MG EC tablet   Take 1 Tab by mouth every day.  Dose: 81 mg     CALCIUM PO   Take 1 Tab by mouth every morning.  Dose: 1 Tablet     lisinopril 2.5 MG Tabs  Commonly known as: PRINIVIL   Take 2.5 mg by mouth every morning.  Dose: 2.5 mg     metoprolol SR 25 MG Tb24  Commonly known as: TOPROL XL   Take 0.5 Tabs by mouth every day.  Dose: 12.5 mg     spironolactone 25 MG Tabs  Commonly known as: ALDACTONE   Take 0.5 Tablets by mouth every day.  Dose: 12.5 mg     Vitamin D3 75 MCG (3000 UT) Tabs   Take 3,000 Units by mouth every evening.  Dose: 3,000 Units            Allergies  Allergies   Allergen Reactions   • Sulfa Drugs Unspecified     Unknown reaction         DIET  Orders Placed This Encounter   Procedures   • Diet Order Diet: Cardiac     Standing Status:   Standing     Number of Occurrences:   1     Order Specific Question:   Diet:     Answer:   Cardiac [6]       ACTIVITY  As tolerated.  Weight  bearing as tolerated    CONSULTATIONS  None     PROCEDURES  None    LABORATORY  Lab Results   Component Value Date    SODIUM 133 (L) 09/09/2021    POTASSIUM 4.0 09/09/2021    CHLORIDE 98 09/09/2021    CO2 23 09/09/2021    GLUCOSE 92 09/09/2021    BUN 19 09/09/2021    CREATININE 0.91 09/09/2021        Lab Results   Component Value Date    WBC 5.3 09/09/2021    HEMOGLOBIN 13.1 09/09/2021    HEMATOCRIT 39.4 09/09/2021    PLATELETCT 100 (L) 09/09/2021

## 2021-09-09 NOTE — CARE PLAN
Problem: Knowledge Deficit - Standard  Goal: Patient and family/care givers will demonstrate understanding of plan of care, disease process/condition, diagnostic tests and medications  Outcome: Progressing   The patient is Stable - Low risk of patient condition declining or worsening    Shift Goals  Clinical Goals: decrease dizzynes  Patient Goals: rest    Progress made toward(s) clinical / shift goals:  Pt has no pain.    Patient is not progressing towards the following goals: N/A

## 2021-09-09 NOTE — H&P
Hospital Medicine History & Physical Note    Date of Service  9/8/2021    Primary Care Physician  Pcp Pt States None    Consultants  None    Code Status  Full Code    Chief Complaint  Chief Complaint   Patient presents with   • Syncope     BIB EMS from [employer] pt was buying groceries and had syncope, +LOC, pt fell and has skin tear to left arm.  pt reports dizziness all summer reports waking up on the floor       History of Presenting Illness  Mayra Veloz is a 81 y.o. female who presented 9/8/2021 with complaints of vertigo for the past few months.  Patient states that she was recently diagnosed last year with heart failure and has been compliant with all of her medications.  She presented with complaints of vertigo twice in the past year and was determined to have inner ear otolith stone.  She states that she takes meclizine as outpatient which does not have any relief of symptoms and states moving her head worsening symptoms.  She does mention that she had diarrhea significantly last night and feels somewhat dehydrated which may have propagated her syncope with loss of consciousness earlier today.  She states she was out riding groceries when she had syncopal episode.  Of note, patient was on telemetry monitoring at bedside and noted to have arrhythmia with heart rate jumping as high as 180 bpm and subsequently going back into sinus rhythm.  She denies history of aortic insufficiency or DVT/PE.  Otherwise denies: Substernal chest pain, cough with sputum production, anosmia, wheezing, nausea, vomiting, constipation, melena, hematochezia, dysuria, hematuria.    Vital signs at time of presentation were as follows: 98.0, 75, 18, 104/56, 98%.  Chest x-ray was performed and unremarkable.  CT head performed and reveals no intracranial abnormalities acutely.    CBC was performed and unremarkable with exception of mild thrombocytopenia of 101.  Troponin T measured at level of 18, CMP performed and reveals mild  hyponatremia of 133, mild elevation in total bilirubin of 1.8 and otherwise relatively unremarkable.  D-dimer was ordered, urinalysis and magnesium ordered.  Twelve-lead EKG reveals sinus rhythm with PVCs and QTC within normal limits.  No ischemic changes noted.    Hospitalist consulted for admission.  Patient given tetanus shot in ED and patient agrees to observation admission for syncopal episode.  She agrees to full CODE STATUS at time of evaluation and alert and oriented x4.  She will be optimized in ED and subsequently transferred to CDU with telemetry monitoring for further optimization of medical management.    Patient does wish to have COVID-19 vaccination at this time and we will order this today.    I discussed the plan of care with patient.    Review of Systems  Review of Systems   Constitutional: Negative for chills and fever.   HENT: Negative for congestion and sore throat.    Eyes: Negative for blurred vision and double vision.   Respiratory: Negative for cough, shortness of breath and wheezing.    Cardiovascular: Negative for chest pain and palpitations.   Gastrointestinal: Positive for diarrhea (resolved). Negative for abdominal pain, blood in stool, constipation, heartburn, melena, nausea and vomiting.   Genitourinary: Negative for dysuria and frequency.   Musculoskeletal: Positive for falls. Negative for back pain and neck pain.   Skin: Negative for itching and rash.   Neurological: Positive for dizziness and loss of consciousness. Negative for focal weakness, weakness and headaches.   Endo/Heme/Allergies: Negative for environmental allergies. Does not bruise/bleed easily.   Psychiatric/Behavioral: Negative for depression. The patient does not have insomnia.        Past Medical History   has a past medical history of Dyslipidemia (2/15/2013), Osteoarthritis (2/6/2013), Osteoporosis, and Thrombocytopenia (HCC) (2/15/2013).    Surgical History   has a past surgical history that includes orif, humerus  and tubal coagulation laparoscopic bilateral.     Family History  family history includes Alcohol abuse in her grandson; Cancer in her father; Heart Disease in her mother and sister; Heart Disease (age of onset: 60) in her brother; Hypertension in her mother; Lung Disease in her daughter and father; No Known Problems in her son and son; Other in her brother, grandson, and son; Stroke (age of onset: 50) in her brother.   Family history reviewed with patient. There is no family history that is pertinent to the chief complaint.     Social History   reports that she has been smoking cigarettes. She has a 59.00 pack-year smoking history. She has never used smokeless tobacco. She reports that she does not drink alcohol and does not use drugs.    Allergies  Allergies   Allergen Reactions   • Sulfa Drugs Unspecified     Unknown reaction         Medications  Prior to Admission Medications   Prescriptions Last Dose Informant Patient Reported? Taking?   CALCIUM PO 9/8/2021 at Unknown time Patient Yes No   Sig: Take 1 Tab by mouth every morning.   Cholecalciferol (VITAMIN D3) 3000 UNITS Tab 9/7/2021 at Unknown time Patient Yes No   Sig: Take 3,000 Units by mouth every evening.   aspirin EC 81 MG EC tablet 9/8/2021 at 0900  No No   Sig: Take 1 Tab by mouth every day.   atorvastatin (LIPITOR) 40 MG Tab 9/7/2021 at hs  No No   Sig: TAKE 1 TABLET BY MOUTH EVERY DAY   Patient taking differently: Take 40 mg by mouth every day.   lisinopril (PRINIVIL) 2.5 MG Tab 9/8/2021 at 0900  Yes Yes   Sig: Take 2.5 mg by mouth every morning.   metoprolol SR (TOPROL XL) 25 MG TABLET SR 24 HR 9/8/2021 at 0900  No No   Sig: Take 0.5 Tabs by mouth every day.   spironolactone (ALDACTONE) 25 MG Tab 9/8/2021 at 0900  No No   Sig: Take 0.5 Tablets by mouth every day.      Facility-Administered Medications: None       Physical Exam  Temp:  [36.7 °C (98 °F)] 36.7 °C (98 °F)  Pulse:  [72-79] 78  Resp:  [18-32] 23  BP: (104-123)/(56-73) 123/64  SpO2:  [89  %-98 %] 94 %  Blood Pressure : 123/64   Temperature: 36.7 °C (98 °F)   Pulse: 78   Respiration: (!) 23   Pulse Oximetry: 94 %       Physical Exam  Constitutional:       Appearance: Normal appearance.   HENT:      Head: Normocephalic and atraumatic.      Mouth/Throat:      Mouth: Mucous membranes are dry.      Pharynx: Oropharynx is clear. No posterior oropharyngeal erythema.      Comments: Dentures upper and lower appreciated    Eyes:      General: No scleral icterus.     Extraocular Movements: Extraocular movements intact.      Conjunctiva/sclera: Conjunctivae normal.      Pupils: Pupils are equal, round, and reactive to light.   Neck:      Vascular: No carotid bruit.   Cardiovascular:      Rate and Rhythm: Normal rate and regular rhythm.      Pulses: Normal pulses.      Heart sounds: Murmur (grade I consistent with MR) heard.   No friction rub. No gallop.       Comments: Soft S1,S2. No rubs/clicks/gallops    Pulmonary:      Effort: Pulmonary effort is normal.      Breath sounds: Normal breath sounds. No wheezing, rhonchi or rales.   Abdominal:      General: Abdomen is flat. Bowel sounds are normal. There is no distension.      Palpations: There is no mass.      Tenderness: There is no abdominal tenderness. There is no rebound.   Musculoskeletal:         General: No swelling. Normal range of motion.      Cervical back: Normal range of motion.      Right lower leg: No edema.      Left lower leg: No edema.   Lymphadenopathy:      Cervical: No cervical adenopathy.   Skin:     General: Skin is warm and dry.      Capillary Refill: Capillary refill takes less than 2 seconds.      Findings: Bruising (ecchymosis left wrist) and lesion (left wrist skin tear) present. No erythema or rash.   Neurological:      General: No focal deficit present.      Mental Status: She is alert and oriented to person, place, and time. Mental status is at baseline.      Cranial Nerves: No cranial nerve deficit.      Sensory: No sensory deficit.       Motor: No weakness.   Psychiatric:         Mood and Affect: Mood normal.         Behavior: Behavior normal.         Laboratory:  Recent Labs     09/08/21  1530   WBC 5.9   RBC 4.49   HEMOGLOBIN 13.5   HEMATOCRIT 41.0   MCV 91.3   MCH 30.1   MCHC 32.9*   RDW 47.3   PLATELETCT 101*   MPV 12.7     Recent Labs     09/08/21  1530   SODIUM 133*   POTASSIUM 4.1   CHLORIDE 98   CO2 21   GLUCOSE 106*   BUN 22   CREATININE 1.10   CALCIUM 9.1     Recent Labs     09/08/21  1530   ALTSGPT 19   ASTSGOT 20   ALKPHOSPHAT 70   TBILIRUBIN 1.8*   GLUCOSE 106*         No results for input(s): NTPROBNP in the last 72 hours.      Recent Labs     09/08/21  1530   TROPONINT 18     I reviewed and interpreted the above twelve-lead EKG.  Sinus rhythm appreciated, PVCs appreciated with poor R wave progression.  QTc within normal limits and no ischemic changes noted.    Imaging:  CT-HEAD W/O   Final Result      1. No acute intracranial abnormality.   2. Chronic microvascular ischemic type changes and age-related atrophy.      DX-CHEST-PORTABLE (1 VIEW)   Final Result      No evidence of acute cardiopulmonary process.          Chest x-ray reviewed and interpreted by myself.  Flattened diaphragm consistent with COPD and otherwise no acute cardiopulmonary disease processes appreciated.      Assessment/Plan:  I anticipate this patient is appropriate for observation status at this time.    * Syncope- (present on admission)  Assessment & Plan  Fall precautions  CT head negative for any intra or acute abnormalities  Twelve-lead EKG reveals premature complexes and otherwise sinus rhythm  Telemetry monitoring  Orthostatic vital signs  Last echocardiogram April 2021 reveals improved ejection fraction at 50% and mild mitral regurgitation  No indication for carotid Doppler at this time  D-dimer ordered to rule out DVT/PE    Hyponatremia- (present on admission)  Assessment & Plan  Asymptomatic at level of 133  Euvolemic  Recheck sodium level in  a.m.      Cardiac arrhythmia- (present on admission)  Assessment & Plan  Premature complexes appreciate EKG and at bedside.  Cardiac monitoring overnight  Consider restarting metoprolol once active vertigo resolves or improves    Essential hypertension- (present on admission)  Assessment & Plan  We will hold off on any antihypertensive home medication therapy at this time  Orthostatic vital signs  Fall precautions      Tobacco abuse- (present on admission)  Assessment & Plan  Patient states that she quit smoking secondary to her vertigo and does not wish to continue at this time    Thrombocytopenia (HCC)- (present on admission)  Assessment & Plan  Mild and we will recheck CBC in a.m.  No indication for platelet/FFP or CCP at this time      VTE prophylaxis: enoxaparin ppx

## 2021-09-09 NOTE — PROGRESS NOTES
Monitor summary per monitor tech report:    SR Rhythm rate 78-89  PAC ectopy  .21/.12/.46

## 2021-09-09 NOTE — DISCHARGE PLANNING
"Anticipated Discharge Disposition:   Home with Home Health    Action:   1015: Per APRN, plan for discharge home with home health.     RN CM spoke to pt at bedside. Pt lives alone in a mobile home. Her son lives about 2 miles away. Pt denies use of home oxygen or other DMEs. Pt was independent with ADLs and IADLs. Discussed discharge planning, HH. Received  choice. [facility] form faxed to [facility].     Pt said she needs assistance with transportation because her son does not get out of work until 7 PM. RN CM spoke to bedside RN. Transportation communication form faxed to [employer], Voalte sent to [employer].     Pt does have a current PCP. Pt has an appointment to establish PCP on Sept. 14, 2021 with MYLA Akers.   Per [facility], pt's referral is being reviewed. They will also reach out to [facility] to see if pt can get seen sooner, otherwise, they will need to wait until 9/14 before they can see pt.     RN CM informed APRN and bedside RN about above information from . Per APRN, pt can discharge pending confirmation from . APRN ok with  seeing pt after 9/14 PCP appointment if [facility] cannot see pt sooner. APRN to give the pt outpatient referrals for needed therapy, in case  declines. RN CM informed [facility].     1215: [facility] accepted pt. Per [facility] note: \"As of 9/9/21, we have accepted the Home Health referral for the patient listed above. At this time [facility] is going to reach out to patient to establish with their care. She will still need to keep her appt on 9/14 with Edwin.\". APRN and bedside RN updated via Voalte.    Per [employer], transportation confirmed via [facility].    Barriers to Discharge:   None.     Plan:   Hospital Care Management will continue to follow and assist with discharge planning needs.        "

## 2021-09-09 NOTE — THERAPY
"Occupational Therapy   Initial Evaluation     Patient Name: Mayra Veloz  Age:  81 y.o., Sex:  female  Medical Record #: [id number]  Today's Date: 9/9/2021     Precautions  Precautions: Fall Risk    Assessment  Patient is 81 y.o. female presented to the hospital following a syncopal event while grocery shopping. Pt is fully independent with ADLs and functional mobility at baseline. She has a supportive son who lives near by and assist with transportation. During OT evaluation, pt completed assessed basic ADLs with supervision. OT provided pt with education on compensatory strategies for LB ADLs to minimize bending forward and safety/fall prevention education. Pt verbalized understanding of all education.     Plan    Recommend Occupational Therapy for Evaluation only. Pt with no further skilled OT needs in the acute care setting at this time. Patient will not be actively followed for occupational therapy services at this time, however may be seen if requested by physician for 1 more visit within 30 days to address any discharge or equipment needs.     DC Equipment Recommendations: None  Discharge Recommendations: Anticipate that the patient will have no further occupational therapy needs after discharge from the hospital     Subjective    \"I try to sit down if I feel dizzy.\"     Objective       09/09/21 0918   Prior Living Situation   Prior Services None   Housing / Facility Mobile Home   Steps Into Home 4   Steps In Home 0   Bathroom Set up Walk In Shower   Equipment Owned None   Lives with - Patient's Self Care Capacity Alone and Able to Care For Self   Comments Son lives nearby and assists with transportation. Son works long shifts at [employer] during the day    Prior Level of ADL Function   Self Feeding Independent   Grooming / Hygiene Independent   Bathing Independent   Dressing Independent   Toileting Independent   Prior Level of IADL Function   Medication Management Independent   Laundry Independent "   Kitchen Mobility Independent   Home Management Independent   Shopping Independent   Prior Level Of Mobility Independent Without Device in Community   Driving / Transportation Relatives / Others Provide Transportation   Occupation (Pre-Hospital Vocational) Retired Due To Age   History of Falls   History of Falls Yes   Date of Last Fall 09/08/21  (syncopal episode at [facility])   Precautions   Precautions Fall Risk   Vitals   O2 Delivery Device None - Room Air   Pain 0 - 10 Group   Pain Rating Scale (NPRS) 0   Cognition    Cognition / Consciousness WDL   Level of Consciousness Alert   Comments pleasant and cooperative   Active ROM Upper Body   Active ROM Upper Body  WDL   Strength Upper Body   Upper Body Strength  WDL   Sensation Upper Body   Upper Extremity Sensation  WDL   Comments denies numbness/tingling   Upper Body Muscle Tone   Upper Body Muscle Tone  WDL   Coordination Upper Body   Coordination WDL   Balance Assessment   Sitting Balance (Static) Good   Sitting Balance (Dynamic) Good   Standing Balance (Static) Fair +   Standing Balance (Dynamic) Fair   Weight Shift Sitting Good   Weight Shift Standing Good   Comments no AD   Bed Mobility    Supine to Sit Independent   Sit to Supine Independent   Scooting Independent   ADL Assessment   Grooming Supervision;Standing   Lower Body Dressing Supervision  (don pants while seated on toilet, socks at EOB)   Toileting Supervision   Comments Pt tends to bend forward for LB dressing. Educated pt on avoiding bending during LB ADLs. Pt able to demo figure 4 position for dressing at EOB and verbalized understanding    Functional Mobility   Sit to Stand Supervised   Bed, Chair, Wheelchair Transfer Supervised   Toilet Transfers Supervised   Mobility walked in room without AD. No LOB noted, no c/o dizziness   Visual Perception   Comments pt denies vision changes   Edema / Skin Assessment   Comments skin tear L forearm   Education Group   Education Provided Role of Occupational  Therapist;Home Safety   Role of Occupational Therapist Patient Response Patient;Acceptance;Explanation;Verbal Demonstration   Home Safety Patient Response Patient;Acceptance;Explanation;Demonstration;Verbal Demonstration  (safety to avoid falling, avoiding positions that cause dizzy)   Problem List   Problem List None

## 2021-09-13 PROBLEM — U07.1 PNEUMONIA DUE TO COVID-19 VIRUS: Status: ACTIVE | Noted: 2021-01-01

## 2021-09-13 PROBLEM — J12.82 PNEUMONIA DUE TO COVID-19 VIRUS: Status: ACTIVE | Noted: 2021-01-01

## 2021-09-13 PROBLEM — J44.9 COPD (CHRONIC OBSTRUCTIVE PULMONARY DISEASE) (HCC): Status: ACTIVE | Noted: 2021-01-01

## 2021-09-13 PROBLEM — R79.89 PRERENAL AZOTEMIA: Status: ACTIVE | Noted: 2021-01-01

## 2021-09-13 NOTE — PROGRESS NOTES
Medication chart review for [facility] services    PCP:  Pcp Pt States None  No address on file  Fax: None    Current medication list     Current Outpatient Medications:   •  Geritol Complete, 1 Tablet, Oral, DAILY  •  meloxicam, 7.5 mg, Oral, QDAY PRN  •  meclizine, 25 mg, Oral, TID PRN  •  ibuprofen, 200 mg, Oral, Q6HRS PRN  •  bismuth subsalicylate, 30 mL, Oral, Q6HRS PRN  •  lisinopril, 7.5 mg, Oral, QAM  •  atorvastatin, TAKE 1 TABLET BY MOUTH EVERY DAY (Patient taking differently: 40 mg, Oral, DAILY)  •  spironolactone, 12.5 mg, Oral, DAILY  •  metoprolol SR, 12.5 mg, Oral, DAILY  •  aspirin EC, 81 mg, Oral, DAILY  •  CALCIUM PO, 1 Tablet, Oral, QAM  •  Vitamin D3, 2,000 Units, Oral, Q EVENING    Allergies   Allergen Reactions   • Sulfa Drugs Unspecified     Unknown reaction         Labs     Lab Results   Component Value Date/Time    HBA1C 5.5 07/18/2017 09:27 AM          Lab Results   Component Value Date/Time    CHOLSTRLTOT 134 07/15/2020 10:34 AM    LDL 60 07/15/2020 10:34 AM    HDL 61 07/15/2020 10:34 AM    TRIGLYCERIDE 64 07/15/2020 10:34 AM       Lab Results   Component Value Date/Time    SODIUM 133 (L) 09/09/2021 12:30 AM    POTASSIUM 4.0 09/09/2021 12:30 AM    CHLORIDE 98 09/09/2021 12:30 AM    CO2 23 09/09/2021 12:30 AM    GLUCOSE 92 09/09/2021 12:30 AM    BUN 19 09/09/2021 12:30 AM    CREATININE 0.91 09/09/2021 12:30 AM     Lab Results   Component Value Date/Time    ALKPHOSPHAT 70 09/08/2021 03:30 PM    ASTSGOT 20 09/08/2021 03:30 PM    ALTSGPT 19 09/08/2021 03:30 PM    TBILIRUBIN 1.8 (H) 09/08/2021 03:30 PM    INR 0.93 03/11/2013 11:50 AM    ALBUMIN 4.1 09/08/2021 03:30 PM      Lab Results   Component Value Date/Time    RBC 4.34 09/09/2021 12:30 AM    HEMOGLOBIN 13.1 09/09/2021 12:30 AM    HEMATOCRIT 39.4 09/09/2021 12:30 AM    PLATELETCT 100 (L) 09/09/2021 12:30 AM      No results found for: MALBCRT, MICROALBUR      Assessment and Plan:   • Received referral from [facility]. Medications reviewed.          Natanael Person, PharmD, MS, BCACP, [facility]  Phone [phone] fax [phone]    This note was created using voice recognition software (Dragon). The accuracy of the dictation is limited by the abilities of the software. I have reviewed the note prior to signing, however some errors in grammar and context are still possible. If you have any questions related to this note please do not hesitate to contact our office.

## 2021-09-13 NOTE — ASSESSMENT & PLAN NOTE
Additional 20 minutes spent today 9/20 discussing advanced directives with patient.   States that she would like everything to be done at attempt to prolong her life.    She would like to be full code  Did consult palliative for further discussion given her age/health issues and decline, pt wishes to maintain FULL CODE status    Another conversation 9/21 with patient and son (see above for details), total time = 20 min    Another conversation 9/22 with son, total time 20 minutes

## 2021-09-13 NOTE — ASSESSMENT & PLAN NOTE
Came with coughing  Increasing oxygen needs, now on 60L HFNC  Decadron 6 mg for 10 days, Baracitinib 9/21  Self prone encouraged  Continuing O2, wean as able   COVID precautions  Procal elevated but improving, will hold on abx at this time   Lasix 9/21    Comfort care

## 2021-09-13 NOTE — ED PROVIDER NOTES
ED Provider Note    CHIEF COMPLAINT  Chief Complaint   Patient presents with   • Cough     BIBA from home for dizziness for past few months and a cough that is new over past week. Dry cough. Patient did not get COVID vaccine. Patient also now c/o diarrhea since thursday. Hx COPD, not on home 02. Patient 88% on RA       HPI  Mayra Veloz is a 81 y.o. female who presents to the emergency department for worsening cough, shortness of breath as well as chest pain and lightheaded dizziness. Past medical history as documented below. She does explain that she was recently seen in the ER after a fall while at the grocery store. She is now been home for approximately four days of persistently worsening. Limited PO intake including food and fluids. Lives alone. Lightheaded dizzy was standing. More short of breath with walking. No leg swelling. No nausea vomiting but she has had diarrhea.    She is not COVID vaccinated.        REVIEW OF SYSTEMS  See HPI for further details. All other systems are negative.     PAST MEDICAL HISTORY   has a past medical history of Dyslipidemia (2/15/2013), Osteoarthritis (2/6/2013), Osteoporosis, and Thrombocytopenia (HCC) (2/15/2013).    SOCIAL HISTORY  Social History     Tobacco Use   • Smoking status: Current Every Day Smoker     Packs/day: 1.00     Years: 59.00     Pack years: 59.00     Types: Cigarettes   • Smokeless tobacco: Never Used   Vaping Use   • Vaping Use: Never used   Substance and Sexual Activity   • Alcohol use: No     Alcohol/week: 0.0 oz   • Drug use: No   • Sexual activity: Never     Comment:  - 58 years       SURGICAL HISTORY   has a past surgical history that includes orif, humerus and tubal coagulation laparoscopic bilateral.    CURRENT MEDICATIONS  Home Medications     Reviewed by Princess Garcia R.N. (Registered Nurse) on 09/13/21 at 1229  Med List Status: Partial   Medication Last Dose Status   aspirin EC 81 MG EC tablet  Active   atorvastatin (LIPITOR) 40  "MG Tab  Active   bismuth subsalicylate (PEPTO-BISMOL) 262 MG/15ML Suspension  Active   CALCIUM PO  Active   Cholecalciferol (VITAMIN D3) 3000 UNITS Tab  Active   ibuprofen (MOTRIN) 200 MG Tab  Active   Iron-Vitamins (GERITOL COMPLETE) Tab  Active   lisinopril (PRINIVIL) 2.5 MG Tab  Active   meclizine (ANTIVERT) 25 MG Tab  Active   meloxicam (MOBIC) 7.5 MG Tab  Active   metoprolol SR (TOPROL XL) 25 MG TABLET SR 24 HR  Active   spironolactone (ALDACTONE) 25 MG Tab  Active                ALLERGIES  Allergies   Allergen Reactions   • Sulfa Drugs Unspecified     Unknown reaction         PHYSICAL EXAM  VITAL SIGNS: /61   Pulse (!) 109   Temp 36.5 °C (97.7 °F) (Temporal)   Resp (!) 35   Ht 1.702 m (5' 7\")   Wt 45.4 kg (100 lb)   SpO2 93%   BMI 15.66 kg/m²  @[email or web address][[id number]@   Pulse ox interpretation: I interpret this pulse ox as normal.  Constitutional: Alert in no apparent distress. Chronic ill appearance. Thin build.  HENT: No signs of trauma, Bilateral external ears normal, Nose normal. dmm  Eyes: Pupils are equal and reactive  Neck: Normal range of motion, No tenderness, Supple  Cardiovascular: Regular rate and rhythm, no murmurs.   Thorax & Lungs: Normal breath sounds, No respiratory distress, No wheezing, No chest tenderness.   Abdomen: Bowel sounds normal, Soft, No tenderness  Skin: Warm, Dry, No erythema, No rash.   Extremities: Intact distal pulses, No edema, No tenderness  Musculoskeletal: Good range of motion in all major joints. No tenderness to palpation or major deformities noted.   Neurologic: Alert , Normal motor function, Normal sensory function, No focal deficits noted.   Psychiatric: Affect normal, Judgment normal, Mood normal.       DIAGNOSTIC STUDIES / PROCEDURES    LABS  Results for orders placed or performed during the hospital encounter of 09/13/21   Lactic acid (lactate)   Result Value Ref Range    Lactic Acid 1.6 0.5 - 2.0 mmol/L   CBC WITH DIFFERENTIAL   Result Value Ref Range    WBC " 7.8 4.8 - 10.8 K/uL    RBC 4.70 4.20 - 5.40 M/uL    Hemoglobin 14.0 12.0 - 16.0 g/dL    Hematocrit 41.0 37.0 - 47.0 %    MCV 87.2 81.4 - 97.8 fL    MCH 29.8 27.0 - 33.0 pg    MCHC 34.1 33.6 - 35.0 g/dL    RDW 45.8 35.9 - 50.0 fL    Platelet Count 82 (L) 164 - 446 K/uL    MPV 13.2 (H) 9.0 - 12.9 fL    Neutrophils-Polys 86.00 (H) 44.00 - 72.00 %    Lymphocytes 4.70 (L) 22.00 - 41.00 %    Monocytes 8.20 0.00 - 13.40 %    Eosinophils 0.00 0.00 - 6.90 %    Basophils 0.30 0.00 - 1.80 %    Immature Granulocytes 0.80 0.00 - 0.90 %    Nucleated RBC 0.00 /100 WBC    Neutrophils (Absolute) 6.72 2.00 - 7.15 K/uL    Lymphs (Absolute) 0.37 (L) 1.00 - 4.80 K/uL    Monos (Absolute) 0.64 0.00 - 0.85 K/uL    Eos (Absolute) 0.00 0.00 - 0.51 K/uL    Baso (Absolute) 0.02 0.00 - 0.12 K/uL    Immature Granulocytes (abs) 0.06 0.00 - 0.11 K/uL    NRBC (Absolute) 0.00 K/uL   COMP METABOLIC PANEL   Result Value Ref Range    Sodium 135 135 - 145 mmol/L    Potassium 4.1 3.6 - 5.5 mmol/L    Chloride 103 96 - 112 mmol/L    Co2 19 (L) 20 - 33 mmol/L    Anion Gap 13.0 7.0 - 16.0    Glucose 112 (H) 65 - 99 mg/dL    Bun 29 (H) 8 - 22 mg/dL    Creatinine 0.93 0.50 - 1.40 mg/dL    Calcium 8.5 8.5 - 10.5 mg/dL    AST(SGOT) 31 12 - 45 U/L    ALT(SGPT) 26 2 - 50 U/L    Alkaline Phosphatase 60 30 - 99 U/L    Total Bilirubin 0.7 0.1 - 1.5 mg/dL    Albumin 3.5 3.2 - 4.9 g/dL    Total Protein 6.3 6.0 - 8.2 g/dL    Globulin 2.8 1.9 - 3.5 g/dL    A-G Ratio 1.3 g/dL   URINALYSIS    Specimen: Urine, Clean Catch   Result Value Ref Range    Micro Urine Req Microscopic    COV-2, FLU A/B, AND RSV BY PCR (2-4 HOURS CEPHEID): Collect NP swab in VTM    Specimen: Nasopharyngeal; Respirate   Result Value Ref Range    Influenza virus A RNA Negative Negative    Influenza virus B, PCR Negative Negative    RSV, PCR Negative Negative    SARS-CoV-2 by PCR DETECTED (AA)     SARS-CoV-2 Source NP Swab    ESTIMATED GFR   Result Value Ref Range    GFR If  >60 >60  mL/min/1.73 m 2    GFR If Non African American 58 (A) >60 mL/min/1.73 m 2   EKG (NOW)   Result Value Ref Range    Report       [facility] Emergency [facility]    Test Date:  2021  Pt Name:    TJ LEIVA               Department: ER  MRN:        [id number]                      Room:        18  Gender:     Female                       Technician: [id number]  :        [id number]                   Requested By:ER TRIAGE PROTOCOL  Order #:    [id number]                    Reading MD: Josef Hawk    Measurements  Intervals                                Axis  Rate:       94                           P:          82  NC:         140                          QRS:        -57  QRSD:       126                          T:          98  QT:         392  QTc:        491    Interpretive Statements  SINUS RHYTHM  MULTIPLE ATRIAL PREMATURE COMPLEXES  RIGHT ATRIAL ABNORMALITY  LEFT BUNDLE BRANCH BLOCK  Compared to ECG 2021 15:22:36  Atrial premature complex(es) now present  Atrial abnormality now present  Electronically Signed On 2021 14:20:47 PDT by Josef Hawk           RADIOLOGY  DX-CHEST-PORTABLE (1 VIEW)   Final Result         No acute cardiac or pulmonary abnormality is identified.          1430: discussed case with hospitals.    COURSE & MEDICAL DECISION MAKING  Pertinent Labs & Imaging studies reviewed. (See chart for details)  81-year-old female presented the emergency department with worsening shortness of breath, lightheaded dizziness. Workup as above today showing AK I and now COVID positive. Patient is mildly hypoxic with a history of COPD. Oxygen supplementation has been completed throughout her stay bring her into the low 90s. She has been given additional dose of steroids here in the ER. IV fluids have been started at a low maintenance dose especially given her COVID positive state. This point I discussed case with hospitals was agreeable to ongoing inpatient care.      FINAL  IMPRESSION  1. Dyspnea, unspecified type    2. SEAN (acute kidney injury) (HCC)    3. Orthostatic dizziness    4. COVID-19            Electronically signed by: Josef Hawk M.D., 9/13/2021 12:49 PM

## 2021-09-13 NOTE — H&P
Hospital Medicine History & Physical Note    Date of Service  9/13/2021    Primary Care Physician  Pcp Pt States None    Consultants  None    Code Status  Full Code    Chief Complaint  Chief Complaint   Patient presents with   • Cough     BIBA from home for dizziness for past few months and a cough that is new over past week. Dry cough. Patient did not get COVID vaccine. Patient also now c/o diarrhea since thursday. Hx COPD, not on home 02. Patient 88% on RA       History of Presenting Illness  Mayra Veloz is a 81 y.o. female who presented 9/13/2021 with Covid-like symptoms for about 5 days.    Patient recently discharged from [facility] on 9 September.  She presented with vertigo.  She is found to have D-dimer of 4.3, however subsequent CTPA was negative.  She was given hydration and then discharged to follow-up with primary care doctor.    Since then, she has reported subjective fever and chills generalized weakness continued vertigo and diarrhea.  She is not vaccinated with Covid.  As symptoms would not improve, decision was made to come into the ED for evaluation.    In the ED, patient found to be tachycardic, tachypneic, hypoxic.  Remarkable labs include moderate thrombocytopenia, prerenal azotemia, non-anion gap metabolic acidosis.  Chest x-ray shows hyperinflated lungs however no evidence of acute cardiopulmonary abnormality.  Patient was given dose of Decadron endorsed to medicine.    I discussed the plan of care with patient.    Review of Systems  Review of Systems   Constitutional: Positive for chills, fever and malaise/fatigue.   HENT: Negative.    Eyes: Negative.    Respiratory: Negative.    Cardiovascular: Negative.    Gastrointestinal: Positive for diarrhea.   Genitourinary: Negative.    Musculoskeletal: Negative.    Skin: Negative.    Neurological: Negative.    Endo/Heme/Allergies: Negative.    Psychiatric/Behavioral: Negative.        Past Medical History   has a past medical history of  Dyslipidemia (2/15/2013), Osteoarthritis (2/6/2013), Osteoporosis, and Thrombocytopenia (HCC) (2/15/2013).    Surgical History   has a past surgical history that includes orif, humerus and tubal coagulation laparoscopic bilateral.     Family History  family history includes Alcohol abuse in her grandson; Cancer in her father; Heart Disease in her mother and sister; Heart Disease (age of onset: 60) in her brother; Hypertension in her mother; Lung Disease in her daughter and father; No Known Problems in her son and son; Other in her brother, grandson, and son; Stroke (age of onset: 50) in her brother.   Family history reviewed with patient. There is no family history that is pertinent to the chief complaint.     Social History   reports that she has been smoking cigarettes. She has a 59.00 pack-year smoking history. She has never used smokeless tobacco. She reports that she does not drink alcohol and does not use drugs.    Allergies  Allergies   Allergen Reactions   • Sulfa Drugs Unspecified     Unknown reaction         Medications  Prior to Admission Medications   Prescriptions Last Dose Informant Patient Reported? Taking?   CALCIUM PO  Patient Yes No   Sig: Take 1 Tablet by mouth every morning. 600mg   Cholecalciferol (VITAMIN D3) 3000 UNITS Tab  Patient Yes No   Sig: Take 2,000 Units by mouth every evening.   Iron-Vitamins (GERITOL COMPLETE) Tab   Yes No   Sig: Take 1 Tablet by mouth every day.   aspirin EC 81 MG EC tablet   No No   Sig: Take 1 Tab by mouth every day.   atorvastatin (LIPITOR) 40 MG Tab   No No   Sig: TAKE 1 TABLET BY MOUTH EVERY DAY   Patient taking differently: Take 40 mg by mouth every day.   bismuth subsalicylate (PEPTO-BISMOL) 262 MG/15ML Suspension   Yes No   Sig: Take 30 mL by mouth every 6 hours as needed (pt taking for diarrhea).   ibuprofen (MOTRIN) 200 MG Tab   Yes No   Sig: Take 200 mg by mouth every 6 hours as needed for Inflammation or Mild Pain.   lisinopril (PRINIVIL) 2.5 MG Tab    Yes No   Sig: Take 7.5 mg by mouth every morning.   meclizine (ANTIVERT) 25 MG Tab   Yes No   Sig: Take 25 mg by mouth 3 times a day as needed for Dizziness or Vertigo.   meloxicam (MOBIC) 7.5 MG Tab   Yes No   Sig: Take 7.5 mg by mouth 1 time a day as needed for Moderate Pain.   metoprolol SR (TOPROL XL) 25 MG TABLET SR 24 HR   No No   Sig: Take 0.5 Tabs by mouth every day.   spironolactone (ALDACTONE) 25 MG Tab   No No   Sig: Take 0.5 Tablets by mouth every day.      Facility-Administered Medications: None       Physical Exam  Temp:  [36.5 °C (97.7 °F)-36.7 °C (98 °F)] 36.5 °C (97.7 °F)  Pulse:  [] 109  Resp:  [25-35] 35  BP: ()/(50-72) 121/61  SpO2:  [85 %-93 %] 93 %  Blood Pressure : 121/61   Temperature: 36.5 °C (97.7 °F)   Pulse: (!) 109   Respiration: (!) 35   Pulse Oximetry: 93 %       Physical Exam  Constitutional:       Appearance: Normal appearance. She is normal weight.   HENT:      Head: Normocephalic.      Nose: Nose normal.      Mouth/Throat:      Mouth: Mucous membranes are moist.   Eyes:      Pupils: Pupils are equal, round, and reactive to light.   Cardiovascular:      Rate and Rhythm: Normal rate and regular rhythm.      Pulses: Normal pulses.   Pulmonary:      Comments: Tachypneic, saturating 95% on NC   No wheezing heard upon PE       Abdominal:      General: Abdomen is flat.      Palpations: Abdomen is soft.   Musculoskeletal:         General: No swelling. Normal range of motion.      Cervical back: Normal range of motion.   Skin:     General: Skin is warm.   Neurological:      General: No focal deficit present.      Mental Status: She is alert and oriented to person, place, and time. Mental status is at baseline.   Psychiatric:         Mood and Affect: Mood normal.         Behavior: Behavior normal.         Thought Content: Thought content normal.         Judgment: Judgment normal.           Laboratory:  Recent Labs     09/13/21  1235   WBC 7.8   RBC 4.70   HEMOGLOBIN 14.0    HEMATOCRIT 41.0   MCV 87.2   MCH 29.8   MCHC 34.1   RDW 45.8   PLATELETCT 82*   MPV 13.2*     Recent Labs     09/13/21  1235   SODIUM 135   POTASSIUM 4.1   CHLORIDE 103   CO2 19*   GLUCOSE 112*   BUN 29*   CREATININE 0.93   CALCIUM 8.5     Recent Labs     09/13/21  1235   ALTSGPT 26   ASTSGOT 31   ALKPHOSPHAT 60   TBILIRUBIN 0.7   GLUCOSE 112*         No results for input(s): NTPROBNP in the last 72 hours.      No results for input(s): TROPONINT in the last 72 hours.    Imaging:  DX-CHEST-PORTABLE (1 VIEW)   Final Result         No acute cardiac or pulmonary abnormality is identified.          X-Ray:  I have personally reviewed the images and compared with prior images.    Assessment/Plan:  I anticipate this patient will require at least two midnights for appropriate medical management, necessitating inpatient admission.    * Pneumonia due to COVID-19 virus  Assessment & Plan  Admit patient to medical floor  Oxygen as needed, keep O2 > 90%  Decadron 6 mg for 10 days  ESR CRP Procalcitonin  Self prone encouraged  Lovenox ppx   COVID precautions      Prerenal azotemia  Assessment & Plan  No evidence of SEAN   Given a L of fluids   Serial BMP     COPD (chronic obstructive pulmonary disease) (HCC)  Assessment & Plan  Duo nebs as needed    Essential hypertension- (present on admission)  Assessment & Plan  Restart blood pressure medication as needed    Hyperlipidemia  Assessment & Plan  Continue lipitor 40 mg po qhs     Advanced care planning/counseling discussion  Assessment & Plan  15 minutes spent discussing advanced directives with patient. States that she would like everything to be done at attempt to prolong her life.  She would like to be full code      VTE prophylaxis: enoxaparin ppx

## 2021-09-13 NOTE — ASSESSMENT & PLAN NOTE
No exacerbation   Chest x-ray showed signs of emphysema with no infiltration on admit   Repeat xray 9/18 with b/l lower lobe infiltrates, suspect worsening COVID-19 PNA  Cont. with Spiriva and Symbicort  DuoNeb as needed

## 2021-09-13 NOTE — ED TRIAGE NOTES
Chief Complaint   Patient presents with   • Cough     BIBA from home for dizziness for past few months and a cough that is new over past week. Dry cough. Patient did not get COVID vaccine. Patient also now c/o diarrhea since thursday. Hx COPD, not on home 02. Patient 88% on RA

## 2021-09-13 NOTE — ASSESSMENT & PLAN NOTE
Her medication lisinopril metoprolol and spironolactone were discontinued on admission  Since patient is on high risk for fall and came with dizziness we will continue holding the medications  Close monitoring  BP is stable without

## 2021-09-14 PROBLEM — N18.1 CHRONIC KIDNEY DISEASE (CKD) STAGE G1/A3, GLOMERULAR FILTRATION RATE (GFR) EQUAL TO OR GREATER THAN 90 ML/MIN/1.73 SQUARE METER AND ALBUMINURIA CREATININE RATIO GREATER THAN 300 MG/G: Status: ACTIVE | Noted: 2021-01-01

## 2021-09-14 PROBLEM — N18.31 CHRONIC KIDNEY DISEASE (CKD) STAGE G3A/A1, MODERATELY DECREASED GLOMERULAR FILTRATION RATE (GFR) BETWEEN 45-59 ML/MIN/1.73 SQUARE METER AND ALBUMINURIA CREATININE RATIO LESS THAN 30 MG/G: Status: ACTIVE | Noted: 2021-01-01

## 2021-09-14 NOTE — ED NOTES
Lab called to add on CRP and Sed rate  
Med Rec complete per pt  Allergies Reviewed    Pt states she took all her medications yesterday  
Micro called to report that COVID is positive on patient. MD Hawk made aware  
Report called to Kaitlyn rojas RN. Pt placed on transport list  
Report given to transport. Pt transferred off the floor  
Report received from DANIEL Sánchez. Pt medicated as ordered  
COVID-19 ruled out

## 2021-09-14 NOTE — PROGRESS NOTES
4 Eyes Skin Assessment Completed by Charlotte IRWIN RN and Hortencia LOZANO RN.    Head WDL  Ears Redness and Blanching  Nose WDL  Mouth WDL  Neck WDL  Breast/Chest WDL  Shoulder Blades WDL  Spine Redness and Blanching, applied sacral mepilex to upper spine area  (R) Arm/Elbow/Hand Redness and Blanching  (L) Arm/Elbow/Hand Redness and Blanching at elbow, forearm has old scabbed area the entire length of forearm. Replaced dressing to left forearm that pt had, reapplied new yellow gauze and roll gauze.   Abdomen WDL  Groin WDL  Scrotum/Coccyx/Buttocks Redness and Blanching applied sacral mepilex  (R) Leg WDL  (L) Leg WDL  (R) Heel/Foot/Toe calloused and cracked areas to heel and plantar near great toe  (L) Heel/Foot/Toe calloused and cracked areas to heel and plantar near great toe          Devices In Places Nasal Cannula      Interventions In Place NC W/Ear Foams, Sacral Mepilex, Pillows, Q2 Turns, Barrier Cream and Pressure Redistribution Mattress    Possible Skin Injury No    Pictures Uploaded Into Epic N/A  Wound Consult Placed N/A  RN Wound Prevention Protocol Ordered No

## 2021-09-14 NOTE — CARE PLAN
Problem: Nutritional:  Goal: Achieve adequate nutritional intake  Description: Patient will consume >50% of meals and supplements  Outcome: Not Met   See Dietetic Intern note.

## 2021-09-14 NOTE — ASSESSMENT & PLAN NOTE
Patient states she has had dizziness for months, previous workup negative including orthostatic vitals, CT head and echocardiogram   Daniele/tachy on medical floor, pt transferred to tele   HR fluctuating between 60's-130's Discussed case with cardiology who recommended against treatment of HR    Will rehydrate   Repeat echo without acute pathology, EF 70%, no valvular pathology   MRI negative for acute infarct   Multifactorial including medications and age and possibly COVID   Supportive care   Fall precautions in place  SNF recommended, referral placed

## 2021-09-14 NOTE — DISCHARGE PLANNING
*ATTN Discharge Planning team: This patient is currently on service with [facility]. Please submit a referral order and face-to-face prior to discharging the patient home. If you have any questions, contact our Transitional Care Specialist team at [phone].

## 2021-09-14 NOTE — PROGRESS NOTES
Covid 19 surge in effect  Rec'd pt via rArgyle, escorted by pt transport. Pt transferred from gurney to bed, 1 assist, shaky and unsteady. Assumed pt care. Assessment completed. AA&OX4. Denies pain at this time. No s/s of discomfort or distress. Pt self turns in bed. Oriented pt to S524-2, explained unit routine including call light use, visiting hours, bed alarm need. Pt verbalized understanding. Bed in lowest position, bed locked, bed alarm on for safety, treaded socks in place, RN and CNA numbers provided, call light within reach.

## 2021-09-14 NOTE — ASSESSMENT & PLAN NOTE
Patient has history of heart failure with ejection fraction 30%  Repeat echo in 4/2021 and showed improvement of ejection fraction 50%, repeat today shows 70%  EKG and troponin are negative for any ischemic changes  Holding lisinopril and metoprolol due to bradycardia and dizziness  Monitor   Given increasing ARF, will dc IVF and trial IV lasix    Comfort care

## 2021-09-14 NOTE — DIETARY
"Nutrition services: Day 1 of admit.  Mayra Veloz is a 81 y.o. female with admitting DX of Pneumonia due to COVID-19 virus  Consult received for BMI <19.    Spoke with pt over the phone due to COVID - pt stated she doesn't have her dentures and had trouble chewing breakfast, mentioned her son might be able to bring dentures after 7pm tonight. Offered to send Nutrition Representative to go over menu selection for easier to chew foods. Pt also stated her appetite has been low for about a week and she has only been eating ~half of her normal 3 meals a day. Pt agreed to adding Boost Plus with meals to optimize nutrition during admit.     Assessment:  Height: 170.2 cm (5' 7\")  Weight: 46.5 kg (102 lb 8.2 oz)  Body mass index is 16.06 kg/m²., BMI classification: Underweight  Diet/Intake: Regular Diet; 50-75% x 1 meal since admit    Evaluation:   1. Pt stated poor appetite with 50% of usual intake x 1 week.   2. Per Chart Review, wt recorded as stable for the past 2 years. Pt reports weight stable.  3. Hyperglycemia - glucose 112 today, no hx of DM. Pt on Decadron per MAR.     Malnutrition Risk: At risk with      Recommendations/Plan:  1. Will change diet to ensure pt receives easy to chew foods..  2. Will add Boost BID with meals to optimize nutrition during admit.   3. Encourage intake of meals and supplements.  4. Document intake of all meals and supplements as % taken in ADL's to provide interdisciplinary communication across all shifts.   5. Monitor weight.  6. Nutrition rep will continue to see patient for ongoing meal and snack preferences.     RD following.         "

## 2021-09-14 NOTE — HOSPITAL COURSE
81-year-old female with history of COPD, heart failure hypertension and dyslipidemia presented 9/13 with coughing, also generalized weakness, denies significant fever or chills however she still having dizziness, labs did not show leukocytosis and kidney function around baseline, urine did not show any signs of infection, blood culture were negative however COVID-19 test was positive, chest x-ray did not show any infiltration, patient was diagnosed with acute respiratory failure with hypoxia, needed 2 L nasal cannula, dexamethasone was started    Recently patient was admitted for dizziness and vertigo for more than couple months during that admission othostatic was negative, CT head did not show any acute finding, CTA for chest did not show any PE, EKG was sinus rhythm and echo in 4/2021 showed ejection fraction 50% with grade 2 diastolic dysfunction and hypokinesis of basal to mid anteroseptal with a small aneurysm, patient still having this feeling with generalized weakness and feeling like her legs will give out however denied any chest pain or palpitation.  Patient did have MRI brain which showed no acute intracranial process, nonspecific deep white matter changes related to chronic microvascular ischemia, and remote left medial parietal lacunar infarct.      Patient was found to have emphysema and 3.5 mm right upper lobe pulmonary nodule on CT scan on 9/9, patient is to follow-up with PCP to repeat CT scan.    Patient had worsening hypoxia on HFNC + non-rebreather, developed confusion/agitation, after multiple conversations with hospitalist, intensivist, palliative care and patient's son patient was transitioned to comfort care on 9/22.

## 2021-09-14 NOTE — PROGRESS NOTES
[facility] Medicine Daily Progress Note    Date of Service  9/14/2021    Chief Complaint  Mayra Veloz is a 81 y.o. female admitted 9/13/2021 with coughing    Hospital Course  81-year-old female with history of COPD, heart failure hypertension and dyslipidemia presented 9/13 with coughing, also generalized weakness, denies significant fever or chills however she still having dizziness, labs did not show leukocytosis and kidney function around baseline, urine did not show any signs of infection, blood culture were negative however COVID-19 test was positive, chest x-ray did not show any infiltration, patient was diagnosed with acute respiratory failure with hypoxia, needed 2 L nasal cannula, dexamethasone was started    Recently patient was admitted for dizziness and vertigo for more than couple months during that admission othostatic was negative, CT head did not show any acute finding, CTA for chest did not show any PE, EKG was sinus rhythm and echo in 4/2021 showed ejection fraction 50% with grade 2 diastolic dysfunction and hypokinesis of basal to mid anteroseptal with a small aneurysm, patient still having this feeling with generalized weakness and feeling like her legs will give out however denied any chest pain or palpitation.      Patient was found to have emphysema and 3.5 mm right upper lobe pulmonary nodule on CT scan on 9/9, patient is to follow-up with PCP to repeat CT scan.      Interval Problem Update  -Evaluated examined the patient at bedside, still having coughing   -patient is on 2 L nasal cannula  -Labs reviewed around baseline with no leukocytosis  -EKG reviewed personally, sinus rhythm no ischemic changes  -patient was noticed to have bradycardia and tachycardia, transfer patient to tele.   -Repeat echo due to dizziness and syncope and since patient has history of heart failure.  -Continue holding blood pressure medications due to dizziness  -Ask PT and OT for evaluation since patient lives  by herself and she is on high risk for fall      I have personally seen and examined the patient at bedside. I discussed the plan of care with patient, bedside RN and charge RN.    Consultants/Specialty  None    Code Status  Full Code    Disposition  Patient is not medically cleared.  Due to significant dizziness and possible arrhythmia, also patient is still on oxygen therapy.  Anticipate discharge to to home with organized home healthcare and close outpatient follow-up.  I have placed the appropriate orders for post-discharge needs.    Review of Systems  Review of Systems   Constitutional: Positive for malaise/fatigue. Negative for fever and weight loss.   HENT: Negative.    Eyes: Negative.    Respiratory: Negative.    Cardiovascular: Negative.    Gastrointestinal: Negative.    Genitourinary: Negative.    Musculoskeletal: Negative.    Skin: Negative.    Neurological: Positive for dizziness and weakness. Negative for tingling, tremors, sensory change, speech change, focal weakness, seizures, loss of consciousness and headaches.   Psychiatric/Behavioral: Negative.         Physical Exam  Temp:  [36 °C (96.8 °F)-36.6 °C (97.9 °F)] 36.4 °C (97.6 °F)  Pulse:  [57-94] 70  Resp:  [18-23] 20  BP: (113-144)/(62-75) 144/64  SpO2:  [93 %-98 %] 94 %    Physical Exam  Constitutional:       Appearance: She is not ill-appearing.   HENT:      Head: Normocephalic and atraumatic.   Eyes:      General: No scleral icterus.  Cardiovascular:      Rate and Rhythm: Normal rate.      Heart sounds: No murmur heard.     Pulmonary:      Effort: Pulmonary effort is normal. No respiratory distress.      Breath sounds: No wheezing or rales.   Abdominal:      General: There is no distension.      Tenderness: There is no abdominal tenderness. There is no guarding.   Musculoskeletal:         General: No swelling or deformity.      Right lower leg: No edema.      Left lower leg: No edema.   Skin:     Coloration: Skin is not jaundiced.      Findings:  No bruising or lesion.   Neurological:      General: No focal deficit present.      Mental Status: She is alert and oriented to person, place, and time. Mental status is at baseline.      Cranial Nerves: No cranial nerve deficit.      Sensory: No sensory deficit.      Motor: No weakness.   Psychiatric:         Mood and Affect: Mood normal.         Fluids    Intake/Output Summary (Last 24 hours) at 9/14/2021 1723  Last data filed at 9/14/2021 1000  Gross per 24 hour   Intake 1240 ml   Output --   Net 1240 ml       Laboratory  Recent Labs     09/13/21  1235   WBC 7.8   RBC 4.70   HEMOGLOBIN 14.0   HEMATOCRIT 41.0   MCV 87.2   MCH 29.8   MCHC 34.1   RDW 45.8   PLATELETCT 82*   MPV 13.2*     Recent Labs     09/13/21  1235   SODIUM 135   POTASSIUM 4.1   CHLORIDE 103   CO2 19*   GLUCOSE 112*   BUN 29*   CREATININE 0.93   CALCIUM 8.5                   Imaging  DX-CHEST-PORTABLE (1 VIEW)   Final Result         No acute cardiac or pulmonary abnormality is identified.      EC-ECHOCARDIOGRAM COMPLETE W/O CONT    (Results Pending)        Assessment/Plan  * Dizziness- (present on admission)  Assessment & Plan  Patient states she has had dizziness for months  Patient was found to have bradycardia and tachycardia; unstable heart rate>> transferred to telemetry  Multifactorial including medications and age  We will evaluate the patient with PT and OT  Oxygen therapy  EKG did not show any atrial fibrillation or bradycardia  Close monitoring  Due to significant dizziness and fall, continue holding blood pressure medication repeat echo  Telemetry    COPD (chronic obstructive pulmonary disease) ([facility])  Assessment & Plan  No exacerbation   Chest x-ray showed signs of emphysema with no infiltration   We will start with Spiriva and Symbicort  DuoNeb as needed    Pneumonia due to COVID-19 virus  Assessment & Plan  Came with coughing  Patient needed oxygen 2 L nasal cannula  Decadron 6 mg for 10 days  Follow-up oxygen requirement and  inflammation markers  Self prone encouraged  Lovenox ppx  Continuing her from oxygen  COVID precautions      Stage B ACC/AHA asymptomatic heart failure (HCC)- (present on admission)  Assessment & Plan  Patient has history of heart failure with ejection fraction 30%  Repeat echo in 4/2021 and showed improvement of ejection fraction 50%  Patient has dizziness, repeat echo  EKG and troponin are negative for any ischemic changes  Holding lisinopril and metoprolol due to bradycardia and dizziness  No need for Lasix, euvolmic     Essential hypertension- (present on admission)  Assessment & Plan  Her medication lisinopril metoprolol and spironolactone were discontinued on admission  Since patient is on high risk for fall and came with dizziness we will continue holding the medications  Close monitoring      Chronic kidney disease (CKD) stage G3a/A1, moderately decreased glomerular filtration rate (GFR) between 45-59 mL/min/1.73 square meter and albuminuria creatinine ratio less than 30 mg/g (HCC)  Assessment & Plan  Around baseline  Labs daily  Avoid nephrotoxic medication    Hyperlipidemia  Assessment & Plan  Continue lipitor 40 mg po qhs     Advanced care planning/counseling discussion  Assessment & Plan  15 minutes spent discussing advanced directives with patient.   States that she would like everything to be done at attempt to prolong her life.    She would like to be full code       VTE prophylaxis: SCDs/TEDs and enoxaparin ppx

## 2021-09-14 NOTE — PROGRESS NOTES
COVID 19 surge in effect    Pt ambulated to bathroom x1 asst, complaining of dizziness. Vitals stable.

## 2021-09-15 NOTE — RESPIRATORY CARE
"COPD EDUCATION by COPD CLINICAL EDUCATOR  9/15/2021  at  11:59 AM by Tonya Callejas RRT     Patient interviewed by COPD education team.  Patient unable to participate in full program.  A short intervention has been conducted.  A comprehensive packet including information about COPD, types of treatments to manage their disease and safe home Oxygen usage was provided and reviewed with patient at the bedside.      Smoking Cessation Intervention and education completed, 3 minutes spent on smoking cessation education with patient.    Provided smoking cessation packet with \"Tips to Quit\" and brochure for \"Free Smoking Cessation Classes\".     COPD Screen  COPD Risk Screening  Do you have a history of COPD?: No  COPD Population Screener  During the past 4 weeks, how much did you feel short of breath?: None/Little of the time  Do you ever cough up any mucus or phlegm?: No/only with occasional colds or infections  In the past 12 months, you do less than you used to because of your breathing problems: Disagree/unsure  Have you smoked at least 100 cigarettes in your entire life?: Yes  How old are you?: 60+  COPD Screening Score: 4  COPD Coordinator Recommended: Yes (seen)    COPD Assessment  COPD Clinical Specialists ONLY  COPD Education Initiated: Yes--Short Intervention (Pt denies COPD Dx or Hx, has quit smoking a few days ago due to illness, given 1st dose of Spiriva, and taught how to use.)  DME Company: n/a  DME Equipment Type: none  Physician Follow Up Appointment:  (will self schedule)  Physician Name: none  Pulmonary Follow Up Appointment:  (declined)  Referrals Initiated: Yes  Pulmonary Rehab: Declined  Smoking Cessation: Yes  $ Smoking Cessation 3-10 Minutes: Symptomatic (3 min)  Smoking Pack Years: 59  Palliative Care:  (declined)  Hospice: N/A  Home Health Care: Yes (established)  [facility]: Yes (sent)  [facility]: Yes (sent)  [facility]: N/A  Private In-Home Care Agency: N/A  Is this " "a COPD exacerbation patient?: No  $ Demo/Eval of SVN's, MDI's and Aerosols: Yes (no resp meds, started on Spiriva, given first dose with instruction)    Meds to Beds  Would the patient like to opt in for Bedside Medication Delivery at Discharge?: Yes, interested     MY COPD ACTION PLAN     It is recommended that patients and physicians /healthcare providers complete this action plan together. This plan should be discussed at each physician visit and updated as needed.    The green, yellow and red zones show groups of symptoms of COPD. This list of symptoms is not comprehensive, and you may experience other symptoms. In the \"Actions\" column, your healthcare provider has recommended actions for you to take based on your symptoms.    Patient Name: Mayra Veloz   YOB: 1940   Last Updated on:     Green Zone:  I am doing well today Actions   •  Usual activitiy and exercise level •  Take daily medications   •  Usual amounts of cough and phlegm/mucus •  Use oxygen as prescribed   •  Sleep well at night •  Continue regular exercise/diet plan   •  Appetite is good •  At all times avoid cigarette smoke, inhaled irritants     Daily Medications (these medications are taken every day):   Tiotropium Bromide Monohydrate (Spiriva) 2 Puffs Once daily        Yellow Zone:  I am having a bad day or a COPD flare Actions   •  More breathless than usual •  Continue daily medications   •  I have less energy for my daily activities •  Use quick relief inhaler as ordered   •  Increased or thicker phlegm/mucus •  Use oxygen as prescribed   •  Using quick relief inhaler/nebulizer more often •  Get plenty of rest   •  Swelling of ankles more than usual •  Use pursed lip breathing   •  More coughing than usual •  At all times avoid cigarette smoke, inhaled irritants   •  I feel like I have a \"chest cold\"   •  Poor sleep and my symptoms woke me up   •  My appetite is not good   •  My medicine is not helping    •  Call provider " immediately if symptoms don’t improve     Continue daily medications, add rescue medications:               Medications to be used during a flare up, (as Discussed with Provider):              Red Zone:  I need urgent medical care Actions   •  Severe shortness of breath even at rest •  Call 911 or seek medical care immediately   •  Not able to do any activity because of breathing    •  Fever or shaking chills    •  Feeling confused or very drowsy     •  Chest pains    •  Coughing up blood

## 2021-09-15 NOTE — PROGRESS NOTES
[facility] Medicine Daily Progress Note    Date of Service  9/15/2021    Chief Complaint  Mayra Veloz is a 81 y.o. female admitted 9/13/2021 with coughing    Hospital Course  81-year-old female with history of COPD, heart failure hypertension and dyslipidemia presented 9/13 with coughing, also generalized weakness, denies significant fever or chills however she still having dizziness, labs did not show leukocytosis and kidney function around baseline, urine did not show any signs of infection, blood culture were negative however COVID-19 test was positive, chest x-ray did not show any infiltration, patient was diagnosed with acute respiratory failure with hypoxia, needed 2 L nasal cannula, dexamethasone was started    Recently patient was admitted for dizziness and vertigo for more than couple months during that admission othostatic was negative, CT head did not show any acute finding, CTA for chest did not show any PE, EKG was sinus rhythm and echo in 4/2021 showed ejection fraction 50% with grade 2 diastolic dysfunction and hypokinesis of basal to mid anteroseptal with a small aneurysm, patient still having this feeling with generalized weakness and feeling like her legs will give out however denied any chest pain or palpitation.      Patient was found to have emphysema and 3.5 mm right upper lobe pulmonary nodule on CT scan on 9/9, patient is to follow-up with PCP to repeat CT scan.      Interval Problem Update    -Evaluated examined the patient at bedside, denies any issues with breathing, still on 2L NC, cont. Dexamethasone for COVID  - still having dizziness with movement, denies spinning, characterizes as lightheaded/presyncopal feeling  - labs stable, small bump in Cr today, encourage oral intake, will monitor   - tele with chronic LBBB, in and out of A.fib, continue dilt Q6H with parameters   - HR appears better controlled today   - add full dose lovenox in setting of underlying A.fib   - echo showed  normal EF 70%, no valvular dysfunction  - orthostatic vitals pending   - PT/OT recommending post acute- SNF referral placed       I have personally seen and examined the patient at bedside. I discussed the plan of care with patient, bedside RN and charge RN.    Consultants/Specialty  None    Code Status  Full Code    Disposition  Patient is not medically cleared.   Anticipate discharge to to skilled nursing facility.  I have placed the appropriate orders for post-discharge needs.    Review of Systems  Review of Systems   Constitutional: Positive for malaise/fatigue. Negative for fever and weight loss.   HENT: Negative for congestion and sore throat.    Eyes: Negative for blurred vision.   Respiratory: Positive for shortness of breath. Negative for cough.    Cardiovascular: Negative for chest pain and palpitations.   Gastrointestinal: Negative for nausea.   Genitourinary: Negative for dysuria.   Neurological: Positive for dizziness and weakness. Negative for tingling, tremors, sensory change, speech change, focal weakness, seizures, loss of consciousness and headaches.   Psychiatric/Behavioral: Negative for depression. The patient is not nervous/anxious.         Physical Exam  Temp:  [36.4 °C (97.6 °F)-38.4 °C (101.1 °F)] 36.6 °C (97.8 °F)  Pulse:  [] 80  Resp:  [17-20] 18  BP: ()/(53-79) 104/64  SpO2:  [90 %-95 %] 93 %    Physical Exam  Constitutional:       Appearance: She is not ill-appearing.      Comments: Frail, thin elderly female   HENT:      Head: Normocephalic and atraumatic.      Mouth/Throat:      Mouth: Mucous membranes are dry.      Pharynx: Oropharynx is clear.      Comments: edentulous  Eyes:      General: No scleral icterus.     Extraocular Movements: Extraocular movements intact.      Conjunctiva/sclera: Conjunctivae normal.   Cardiovascular:      Rate and Rhythm: Normal rate. Rhythm irregular.      Heart sounds: No murmur heard.     Pulmonary:      Effort: Pulmonary effort is normal. No  respiratory distress.      Breath sounds: No wheezing or rales.   Abdominal:      General: There is no distension.      Tenderness: There is no abdominal tenderness. There is no guarding.   Musculoskeletal:         General: No swelling or deformity.      Cervical back: Neck supple.      Right lower leg: No edema.      Left lower leg: No edema.   Skin:     Coloration: Skin is not jaundiced.      Findings: No bruising or lesion.   Neurological:      General: No focal deficit present.      Mental Status: She is alert and oriented to person, place, and time. Mental status is at baseline.      Cranial Nerves: No cranial nerve deficit.      Sensory: No sensory deficit.      Motor: No weakness.   Psychiatric:         Mood and Affect: Mood normal.         Fluids    Intake/Output Summary (Last 24 hours) at 9/15/2021 1547  Last data filed at 9/14/2021 2000  Gross per 24 hour   Intake 50 ml   Output --   Net 50 ml       Laboratory  Recent Labs     09/13/21  1235 09/15/21  0313   WBC 7.8 10.8   RBC 4.70 4.69   HEMOGLOBIN 14.0 14.0   HEMATOCRIT 41.0 42.1   MCV 87.2 89.8   MCH 29.8 29.9   MCHC 34.1 33.3*   RDW 45.8 48.4   PLATELETCT 82* 96*   MPV 13.2* 13.4*     Recent Labs     09/13/21  1235 09/15/21  0313   SODIUM 135 139   POTASSIUM 4.1 4.1   CHLORIDE 103 108   CO2 19* 19*   GLUCOSE 112* 108*   BUN 29* 40*   CREATININE 0.93 1.23   CALCIUM 8.5 8.5                   Imaging  EC-ECHOCARDIOGRAM LTD W/O CONT   Final Result      DX-CHEST-LIMITED (1 VIEW)   Final Result         1.  Interstitial pulmonary parenchymal prominence suggest chronic underlying lung disease, component of interstitial edema and/or infiltrates not excluded.   2.  Small left pleural effusion   3.  Atherosclerosis      DX-CHEST-PORTABLE (1 VIEW)   Final Result         No acute cardiac or pulmonary abnormality is identified.           Assessment/Plan  * Dizziness- (present on admission)  Assessment & Plan  Patient states she has had dizziness for months, previous  workup negative including orthostatic vitals, CT head and echocardiogram   Daniele/tachy on medical floor, pt transferred to tele   HR has been relatively stable 60-80's, home BP meds have been held, pt started on diltiazem for A fib with RVR noted on EKG/tele, will continue    Repeat echo without acute pathology, EF 70%, no valvular pathology   Orthostatic vitals ordered   If persistent symptoms and fluctuating HR, consider cardiology consultation   Multifactorial including medications and age and possibly COVID   Supportive care   Fall precautions in place      Chronic kidney disease (CKD) stage G3a/A1, moderately decreased glomerular filtration rate (GFR) between 45-59 mL/min/1.73 square meter and albuminuria creatinine ratio less than 30 mg/g ([facility])  Assessment & Plan  Around baseline  Labs daily  Avoid nephrotoxic medication    COPD (chronic obstructive pulmonary disease) ([facility])  Assessment & Plan  No exacerbation   Chest x-ray showed signs of emphysema with no infiltration   We will start with Spiriva and Symbicort  DuoNeb as needed    Pneumonia due to COVID-19 virus  Assessment & Plan  Came with coughing  Patient needed oxygen 2 L nasal cannula  Decadron 6 mg for 10 days  Follow-up oxygen requirement and inflammation markers  Self prone encouraged  Continuing her from oxygen  COVID precautions      Stage B ACC/AHA asymptomatic heart failure (HCC)- (present on admission)  Assessment & Plan  Patient has history of heart failure with ejection fraction 30%  Repeat echo in 4/2021 and showed improvement of ejection fraction 50%, repeat today shows 70%  EKG and troponin are negative for any ischemic changes  Holding lisinopril and metoprolol due to bradycardia and dizziness  No need for Lasix, euvolmic   Monitor     LBBB (left bundle branch block)- (present on admission)  Assessment & Plan  Chronic LBBB, tele showing in and out of A.fib   Patient started on Diltiazem with stabilization of heart rate   Will start full  dose lovenox in the interim for stroke prevention   Cont. Tele monitoring   Consider cardiology consultation if continues to have rate/rhythm issues     Essential hypertension- (present on admission)  Assessment & Plan  Her medication lisinopril metoprolol and spironolactone were discontinued on admission  Since patient is on high risk for fall and came with dizziness we will continue holding the medications  Close monitoring  BP is stable without       Hyperlipidemia  Assessment & Plan  Continue lipitor 40 mg po qhs     Advanced care planning/counseling discussion  Assessment & Plan  15 minutes spent discussing advanced directives with patient.   States that she would like everything to be done at attempt to prolong her life.    She would like to be full code       VTE prophylaxis: SCDs/TEDs and therapeutic anticoagulation with lovenox

## 2021-09-15 NOTE — DISCHARGE PLANNING
Care Transition Team Assessment  Assessment completed via chart review-covid+    Per chart review, patient lives alone, does not use DME @ home and has support from her son that lives two minutes away. Patient was on service with [facility]  prior to admission, orders will be needed for resumption PT/OT has been ordered.     Information Source  Orientation Level: Oriented X4  Information Given By: Other (Comments) (chart review)  Who is responsible for making decisions for patient? : Patient    Readmission Evaluation  Is this a readmission?: Yes - unplanned readmission  Why do you think you were readmitted?: covid  Was an appointment arranged for you prior to discharge?: Yes, attended appointment  Were there new prescriptions you were supposed to fill after you were discharged?: Yes, prescriptions filled  Did you understand your discharge instructions?: Yes  Did you have enough support after your last discharge?: Yes    Elopement Risk  Legal Hold: No  Ambulatory or Self Mobile in Wheelchair: Yes  Disoriented: No  Psychiatric Symptoms: None  History of Wandering: No  Elopement this Admit: No  Vocalizing Wanting to Leave: No  Displays Behaviors, Body Language Wanting to Leave: No-Not at Risk for Elopement  Elopement Risk: Not at Risk for Elopement    Interdisciplinary Discharge Planning  Lives with - Patient's Self Care Capacity: Alone and Able to Care For Self  Patient or legal guardian wants to designate a caregiver: No  Support Systems: Family Member(s)  Housing / Facility: Mobile Home  Patient Prefers to be Discharged to:: home  Durable Medical Equipment: Not Applicable    Discharge Preparedness  What is your plan after discharge?: Uncertain - pending medical team collaboration  What are your discharge supports?: Child  Prior Functional Level: Ambulatory, Independent with Activities of Daily Living, Independent with Medication Management  Difficulity with ADLs: None  Difficulity with IADLs: Driving    Functional  Assesment  Prior Functional Level: Ambulatory, Independent with Activities of Daily Living, Independent with Medication Management    Finances  Financial Barriers to Discharge: No  Prescription Coverage: Yes    Vision / Hearing Impairment  Vision Impairment : Yes  Right Eye Vision: Wears Glasses (glasses at home)  Left Eye Vision: Wears Glasses  Hearing Impairment : No         Advance Directive  Advance Directive?: None  Advance Directive offered?: AD Booklet refused    Domestic Abuse  Have you ever been the victim of abuse or violence?: No  Physical Abuse or Sexual Abuse: No  Verbal Abuse or Emotional Abuse: No  Possible Abuse/Neglect Reported to:: Not Applicable    Psychological Assessment  History of Substance Abuse: None  History of Psychiatric Problems: No  Non-compliant with Treatment: No  Newly Diagnosed Illness: No    Discharge Risks or Barriers  Discharge risks or barriers?: No  Patient risk factors: Vulnerable adult    Anticipated Discharge Information  Discharge Disposition: Still a Patient (30)

## 2021-09-15 NOTE — HEART FAILURE PROGRAM
"Per Dr. Cheema's note on 9/15/21, no acute heart failure at this time. Therefore, a seven day HF f/u appt is not currently indicated. Nor does the HF discharge checklist need to be used.    Patient is being primarily treated for dizziness and SARS CoV2.     Should clinical status change to acute HF, patient will require a seven calendar day f/u appt which can be achieved by placing a \"schedule heart failure follow up appointment\" order per protocol or by calling [phone] (line answered 7 days per week, [phone]).      Thank you, Maci, Cardio RN Navigator [phone]          "

## 2021-09-15 NOTE — PROGRESS NOTES
Paged by RN that patient was noted to have tachycardia with heart rate ranging between 140s - 160s.  Was noted to have fever of 101.1 at 8 PM.  Blood pressure 115/53.    Per chart review, patient was noted to have bradycardia and tachycardia to which she was transported to telemetry.    UA at admission negative for UTI.  Magnesium 1.9 on 9/8/21  TSH 0.390 on 9/14/2021      Repeat chest x-ray shows hyperinflated lungs, however no signs of infiltrates nor consolidation to suggest infection.  Lactic acid 1.9    EKG showed A fib w/ RVR.     A/P:  A fib w/ RVR    Ordered lovenox x 1, further AC management per day team  Ordered Cardizem 10 mg IV push  Started Cardizem 30 mg every 6 hours  Echocardiogram ordered by daytime hospitalist, pending    I spent 31 min in reviewing the patient's condition, physical examination, laboratory and imaging data, prior documentation, in discussion with RN, RT, charge nurse, patient, and in formulating an assessment/plan.     Critical Care time: 31 min. No time overlap, procedures not included in time.

## 2021-09-15 NOTE — THERAPY
"Occupational Therapy   Initial Evaluation     Patient Name: Mayra Veloz  Age:  81 y.o., Sex:  female  Medical Record #: [id number]  Today's Date: 9/15/2021     Precautions  Precautions: (P) Fall Risk  Comments: (P) COVID +    Assessment  Patient is 81 y.o. female admitted coughing and dizziness and COVID +; PMH significant for COPD and heart failure. Pt normally independent with all mobility and ADLs, living alone in a mobile home with son living nearby. Pt required Gamaliel for mobility and ADLs, continues to have intermittent dizziness with mobility. Pt owuld benefit from continued skilled therapy while admitted as well as recommend post-acute placement due to living alone and not at her functional baseline.    Plan    Recommend Occupational Therapy 3 times per week until therapy goals are met for the following treatments:  Adaptive Equipment, Self Care/Activities of Daily Living, Therapeutic Activities and Therapeutic Exercises.    DC Equipment Recommendations: (P) Unable to determine at this time  Discharge Recommendations: (P) Recommend post-acute placement for additional occupational therapy services prior to discharge home     Subjective    \"I've been dizzy all summer\"     Objective       09/15/21 0931   Prior Living Situation   Prior Services Home-Independent   Housing / Facility Mobile Home   Steps Into Home 4   Steps In Home 0   Rail Right Rail (Steps into Home)   Elevator No   Bathroom Set up Walk In Shower   Equipment Owned None   Lives with - Patient's Self Care Capacity Alone and Able to Care For Self   Comments Son lives nearby   Prior Level of ADL Function   Self Feeding Independent   Grooming / Hygiene Independent   Bathing Independent   Dressing Independent   Toileting Independent   Prior Level of IADL Function   Medication Management Independent   Laundry Independent   Kitchen Mobility Independent   Finances Independent   Home Management Independent   Shopping Independent   Prior Level Of " Mobility Independent Without Device in Community   Driving / Transportation Driving Independent   Occupation (Pre-Hospital Vocational) Retired Due To Age   History of Falls   History of Falls Yes   Date of Last Fall   (reason for previous admission)   Precautions   Precautions Fall Risk   Comments COVID +   Pain 0 - 10 Group   Therapist Pain Assessment Post Activity Pain Same as Prior to Activity;Nurse Notified   Cognition    Cognition / Consciousness WDL   Level of Consciousness Alert   Comments Lethargic, but cooperative and willing to participate   Active ROM Upper Body   Active ROM Upper Body  WDL   Dominant Hand Right   Strength Upper Body   Upper Body Strength  X   Gross Strength Generalized Weakness, Equal Bilaterally.    Sensation Upper Body   Upper Extremity Sensation  WDL   Upper Body Muscle Tone   Upper Body Muscle Tone  WDL   Neurological Concerns   Neurological Concerns No   Coordination Upper Body   Coordination WDL   Balance Assessment   Sitting Balance (Static) Fair   Sitting Balance (Dynamic) Fair -   Standing Balance (Static) Fair -   Standing Balance (Dynamic) Poor +   Weight Shift Sitting Fair   Weight Shift Standing Fair   Comments w/ HHA   Bed Mobility    Supine to Sit Minimal Assist   Scooting Minimal Assist   Rolling Minimum Assist to Lt.   Comments HOB elevated, use of railing   ADL Assessment   Upper Body Dressing Minimal Assist   Lower Body Dressing Minimal Assist   Toileting   (NT-refused need)   How much help from another person does the patient currently need...   Putting on and taking off regular lower body clothing? 2   Bathing (including washing, rinsing, and drying)? 2   Toileting, which includes using a toilet, bedpan, or urinal? 2   Putting on and taking off regular upper body clothing? 3   Taking care of personal grooming such as brushing teeth? 4   Eating meals? 4   6 Clicks Daily Activity Score 17   Functional Mobility   Sit to Stand Minimal Assist   Bed, Chair, Wheelchair  Transfer Minimal Assist   Toilet Transfers Refused   Transfer Method Stand Pivot   Mobility bed mobility, stand and transfer to chair   Comments w/ HHA   Edema / Skin Assessment   Edema / Skin  Not Assessed   Activity Tolerance   Sitting in Chair left seated in chair   Sitting Edge of Bed 5 min   Standing 5 min   Patient / Family Goals   Patient / Family Goal #1 To get better   Short Term Goals   Short Term Goal # 1 Pt will complete ADL transfers with supervision   Short Term Goal # 2 Pt will complete LB dressing with supervision   Short Term Goal # 3 Pt will complete toileting with supervision   Education Group   Education Provided Role of Occupational Therapist   Role of Occupational Therapist Patient Response Patient;Acceptance;Explanation   Problem List   Problem List Decreased Active Daily Living Skills;Decreased Homemaking Skills;Decreased Upper Extremity Strength Right;Decreased Upper Extremity Strength Left;Decreased Functional Mobility;Decreased Activity Tolerance;Impaired Postural Control / Balance   Interdisciplinary Plan of Care Collaboration   IDT Collaboration with  Nursing;Physical Therapist   Patient Position at End of Therapy Seated;Chair Alarm On;Call Light within Reach;Tray Table within Reach;Phone within Reach   Collaboration Comments RN updated

## 2021-09-15 NOTE — THERAPY
Physical Therapy   Initial Evaluation     Patient Name: Mayra Veloz  Age:  81 y.o., Sex:  female  Medical Record #: [id number]  Today's Date: 9/15/2021     Precautions  Precautions: Fall Risk  Comments: COVID +    Assessment  Patient is 81 y.o. female presenting with coughing and dizziness and COVID +; PMH significant for COPD and heart failure. Pt mobilized as detailed below in chart demonstrating limited activity tolerance due to weakness, dizziness, and limited functional balance. At this time pt is requiring Gamaliel for bed mobility, transfers, and short gait. Pt would benefit from post acute rehab placement prior to return home as she lives alone and was independent prior. Will continue to follow.     Plan    Recommend Physical Therapy 3 times per week until therapy goals are met for the following treatments:  Bed Mobility, Equipment, Gait Training, Manual Therapy, Neuro Re-Education / Balance, Self Care/Home Evaluation, Sensory Integration Techniques, Stair Training, Therapeutic Activities and Therapeutic Exercises    DC Equipment Recommendations: Front-Wheel Walker  Discharge Recommendations: Recommend post-acute placement for additional physical therapy services prior to discharge home       Subjective    Pt greeted in bed and agreeable to PT session.      Objective       09/15/21 0926   Prior Living Situation   Prior Services Home-Independent   Housing / Facility Mobile Home   Steps Into Home 4   Steps In Home 0   Rail Right Rail (Steps into Home)   Equipment Owned None   Lives with - Patient's Self Care Capacity Alone and Able to Care For Self   Prior Level of Functional Mobility   Bed Mobility Independent   Transfer Status Independent   Ambulation Independent   Distance Ambulation (Feet)   (community)   Assistive Devices Used None   Stairs Independent   History of Falls   History of Falls Yes   Cognition    Cognition / Consciousness WDL   Level of Consciousness Alert   Comments pleasant and cooperative    Passive ROM Lower Body   Passive ROM Lower Body WDL   Active ROM Lower Body    Active ROM Lower Body  WDL   Strength Lower Body   Lower Body Strength  WDL   Sensation Lower Body   Lower Extremity Sensation   WDL   Lower Body Muscle Tone   Lower Body Muscle Tone  WDL   Neurological Concerns   Neurological Concerns No   Coordination Lower Body    Coordination Lower Body  WDL   Balance Assessment   Sitting Balance (Static) Fair   Sitting Balance (Dynamic) Fair -   Standing Balance (Static) Fair -   Standing Balance (Dynamic) Poor +   Weight Shift Sitting Fair   Weight Shift Standing Fair   Comments w/ B HHA   Gait Analysis   Gait Level Of Assist Minimal Assist   Assistive Device Hand Held Assist   Distance (Feet) 5   # of Times Distance was Traveled 1   Deviation Bradykinetic;Shuffled Gait;Decreased Heel Strike;Decreased Toe Off;Step To   # of Stairs Climbed 0   Weight Bearing Status FWB   Comments pt limited by dizziness/lightheadedness   Bed Mobility    Supine to Sit Minimal Assist   Sit to Supine   (up in chair following)   Scooting Minimal Assist   Rolling Minimum Assist to Lt.   Comments w/ use of railing    Functional Mobility   Sit to Stand Minimal Assist   Bed, Chair, Wheelchair Transfer Minimal Assist   Comments w/ B HHA   Activity Tolerance   Sitting in Chair up in chair after session   Sitting Edge of Bed 5mins   Standing 4mins   Comments limited by dizziness with mobility    Short Term Goals    Short Term Goal # 1 Pt will transition supine <> sit with spv within 6 visits in order to improve functional mobility.    Short Term Goal # 2 Pt will transition sit <> stand with FWW and spv within 6 visits in order to improve functional mobility.    Short Term Goal # 3 Pt will ambulate 100ft with FWW and spv within 6 visits in order to improve functional mobility.    Short Term Goal # 4 Pt will ascend/descend 4 stairs w/ R rail and spv within 6 visits in order to access her home.    Education Group   Education  Provided Role of Physical Therapist;Gait Training   Role of Physical Therapist Patient Response Patient;Acceptance;Explanation;Demonstration;Verbal Demonstration;Action Demonstration   Gait Training Patient Response Patient;Acceptance;Demonstration;Explanation;Verbal Demonstration;Action Demonstration   Problem List    Problems Impaired Bed Mobility;Impaired Transfers;Impaired Ambulation;Impaired Balance;Decreased Activity Tolerance;Safety Awareness Deficits / Cognition

## 2021-09-15 NOTE — ASSESSMENT & PLAN NOTE
Chronic LBBB, tele showing in and out of A.fib   Patient started on Diltiazem with stabilization of heart rate   Started on Eliquis for stroke prevention  Cont. Tele monitoring

## 2021-09-15 NOTE — CASE COMMUNICATION
Quality Review for 9.11.21 SOC OASIS performed on by DIANNE Hdz RN on 9.15, 2021:    Edits completed by DIANNE Hdz RN:  1. Added poor ambulation to HB status per narrative  2. Added #1 to  pt fell and hurt self at [facility]. Added #6 pt uses tobacco  3. Changed  to 4 per cg narrative  4. Changed  to 2 per resp tab pt has dyspnea on exertion  5. Changed  to 3 per ambulation score   6. Changed  to 0   7. Added ambulation to functional limitations. Added ambulate only with assistance, fall risk to safety measures. Added heart healthy diet to nutritional requirements per dxs  8. Updated F2F data  9. Added [facility] referral for COPD  10. Added COPD and HTN

## 2021-09-16 NOTE — CONSULTS
Case discussed with primary MD.  At this time, no further treatment for patient's atrial fibrillation.  Atrial fibrillation with rapid ventricular rate is secondary and reactive to patient's current medical issues of infection / COVID pneumonia.  I explained that atrial fibrillation is not dangerous to the overall health at this time.  No indication for strict control of heart rate unless patient is hemodynamically unstable. In fact, strict HR control might reduce cardiac output as CO=SV x HR, which could be harmful to this patient overall or worsen lightheadedness or syncope.  Okay for heart rate to be relatively higher even in the 150s as long as patient is hemodynamically stable.  I presume that heart rate will be better controlled once patient's current medical issues of infection resolved.  Sinus restoration is not beneficial at this point time.  The risk of recurrent back into atrial fibrillation is relatively high in the setting of infection / sepsis.  Adequate hydration. Avoid dehydration.     Jesús Dow MD.   [facility].

## 2021-09-16 NOTE — DISCHARGE PLANNING
Anticipated Discharge Disposition: SNF    Action: Patient discussed in IDDR rounds. Patient seen by PT/OT who recommend SNF. Patient covid positive on 9/13/21. SNF's won't take patient until 10-14 days after positive test. Patient was on service with  prior to admission.     Barriers to Discharge: covid positive    Plan: LSW to continue to follow and send out SNF referrals when closer to 10-14 days.

## 2021-09-16 NOTE — THERAPY
Physical Therapy   Daily Treatment     Patient Name: Mayra Veloz  Age:  81 y.o., Sex:  female  Medical Record #: [id number]  Today's Date: 9/16/2021     Precautions  Precautions: Fall Risk    Assessment    Pt was found supine in bed, recpetive to therapy. Pt required HOB raised and pulled self up to EOB using therapist's hand. FWW was not present in room, so pt held onto PT's hands to simulate FWW. Pt ambulated to a bedside commode, transferred well to it. Pt did require small amount of physical assist to rise to standing from bedside commode. Pt was ambulated back to bed where she transferred self back to supine. Pt is still limited significantly in OOB mobility and somewhat in bed mobility. Recommend continuing current POC as indicated.     Plan    Continue current treatment plan.    DC Equipment Recommendations: Unable to determine at this time  Discharge Recommendations: Recommend post-acute placement for additional physical therapy services prior to discharge home     Objective       09/16/21 1158   Pain 0 - 10 Group   Therapist Pain Assessment Post Activity Pain Same as Prior to Activity;Nurse Notified;0   Cognition    Cognition / Consciousness WDL   Level of Consciousness Alert   Balance   Sitting Balance (Static) Fair   Sitting Balance (Dynamic) Fair   Standing Balance (Static) Fair -   Standing Balance (Dynamic) Fair -   Weight Shift Sitting Fair   Weight Shift Standing Fair   Skilled Intervention Verbal Cuing;Facilitation  (HHA in standing )   Gait Analysis   Gait Level Of Assist Minimal Assist   Assistive Device Other (Comments)  (HHA )   Distance (Feet) 5   # of Times Distance was Traveled 2   Skilled Intervention Verbal Cuing;Postural Facilitation;Facilitation   Bed Mobility    Supine to Sit Minimal Assist   Sit to Supine Supervised   Scooting Supervised   Skilled Intervention Facilitation;Verbal Cuing;Postural Facilitation   Functional Mobility   Sit to Stand Minimal Assist   Bed, Chair,  Wheelchair Transfer Supervised   Toilet Transfers Supervised   Transfer Method Stand Step   Skilled Intervention Verbal Cuing;Facilitation   Activity Tolerance   Sitting in Chair 7  (bedside commode)   Sitting Edge of Bed 5   Standing 1   Short Term Goals    Short Term Goal # 1 Pt will transition supine <> sit with spv within 6 visits in order to improve functional mobility.    Goal Outcome # 1 goal not met   Short Term Goal # 2 Pt will transition sit <> stand with FWW and spv within 6 visits in order to improve functional mobility.    Goal Outcome # 2 Goal not met   Short Term Goal # 3 Pt will ambulate 100ft with FWW and spv within 6 visits in order to improve functional mobility.    Goal Outcome # 3 Goal not met   Short Term Goal # 4 Pt will ascend/descend 4 stairs w/ R rail and spv within 6 visits in order to access her home.    Goal Outcome # 4 Goal not met

## 2021-09-16 NOTE — THERAPY
"Occupational Therapy  Daily Treatment     Patient Name: Mayra Veloz  Age:  81 y.o., Sex:  female  Medical Record #: [id number]  Today's Date: 9/16/2021     Precautions  Precautions: Fall Risk  Comments: (P) COVID +    Assessment    Pt seen for follow up OT tx session, pt continues to be limited by poor activity tolerance, decreased strength, and overall debility. Pt required Gamaliel for all mobility and some ADLs as patient unable to complete without significant energy used due to debility. Will continue to see for skilled therapy while admitted as well as recommend post-acute placement.    Plan    Continue current treatment plan.    DC Equipment Recommendations: (P) Unable to determine at this time  Discharge Recommendations: (P) Recommend post-acute placement for additional occupational therapy services prior to discharge home    Subjective    \"I'm supposed to get an MRI eventually\"     Objective       09/16/21 1150   Precautions   Precautions Fall Risk   Comments COVID +   Pain 0 - 10 Group   Therapist Pain Assessment Post Activity Pain Same as Prior to Activity;Nurse Notified   Cognition    Cognition / Consciousness WDL   Level of Consciousness Alert   Strength Upper Body   Gross Strength Generalized Weakness, Equal Bilaterally.    Other Treatments   Other Treatments Provided Pt provided with education on need to continue mobility despite not feeling well to lower risk of debility   Balance   Sitting Balance (Static) Fair   Sitting Balance (Dynamic) Fair -   Standing Balance (Static) Fair -   Standing Balance (Dynamic) Poor +   Weight Shift Sitting Fair   Weight Shift Standing Fair   Skilled Intervention Verbal Cuing   Comments w/ HHA   Bed Mobility    Supine to Sit Minimal Assist   Sit to Supine Supervised   Scooting Supervised   Rolling Minimal Assist to Rt.   Skilled Intervention Verbal Cuing;Postural Facilitation   Activities of Daily Living   Upper Body Dressing Minimal Assist   Lower Body Dressing " Minimal Assist   Toileting Supervision   How much help from another person does the patient currently need...   Putting on and taking off regular lower body clothing? 2   Bathing (including washing, rinsing, and drying)? 2   Toileting, which includes using a toilet, bedpan, or urinal? 3   Putting on and taking off regular upper body clothing? 3   Taking care of personal grooming such as brushing teeth? 4   Eating meals? 4   6 Clicks Daily Activity Score 18   Functional Mobility   Sit to Stand Minimal Assist   Bed, Chair, Wheelchair Transfer Minimal Assist   Toilet Transfers Minimal Assist   Transfer Method Stand Step   Mobility bed mobility, mobility in room, transfer to BSC, transfer back to bed   Skilled Intervention Verbal Cuing   Comments w/ HHA   Activity Tolerance   Sitting in Chair 10  [place] [place]   Sitting Edge of Bed 5   Standing 5   Patient / Family Goals   Patient / Family Goal #1 To get better   Goal #1 Outcome Progressing slower than expected   Short Term Goals   Short Term Goal # 1 Pt will complete ADL transfers with supervision   Goal Outcome # 1 Progressing slower than expected   Short Term Goal # 2 Pt will complete LB dressing with supervision   Goal Outcome # 2 Progressing slower than expected   Short Term Goal # 3 Pt will complete toileting with supervision   Goal Outcome # 3 Goal met   Education Group   Education Provided Role of Occupational Therapist;Pathology of bedrest   Role of Occupational Therapist Patient Response Patient;Acceptance;Explanation   Pathology of Bedrest Patient Response Patient;Acceptance;Explanation;Action Demonstration   Interdisciplinary Plan of Care Collaboration   IDT Collaboration with  Nursing   Patient Position at End of Therapy In Bed;Bed Alarm On;Call Light within Reach;Tray Table within Reach;Phone within Reach   Collaboration Comments RN updated

## 2021-09-16 NOTE — PROGRESS NOTES
[facility] Medicine Daily Progress Note    Date of Service  9/16/2021    Chief Complaint  Mayra Veloz is a 81 y.o. female admitted 9/13/2021 with coughing    Hospital Course  81-year-old female with history of COPD, heart failure hypertension and dyslipidemia presented 9/13 with coughing, also generalized weakness, denies significant fever or chills however she still having dizziness, labs did not show leukocytosis and kidney function around baseline, urine did not show any signs of infection, blood culture were negative however COVID-19 test was positive, chest x-ray did not show any infiltration, patient was diagnosed with acute respiratory failure with hypoxia, needed 2 L nasal cannula, dexamethasone was started    Recently patient was admitted for dizziness and vertigo for more than couple months during that admission othostatic was negative, CT head did not show any acute finding, CTA for chest did not show any PE, EKG was sinus rhythm and echo in 4/2021 showed ejection fraction 50% with grade 2 diastolic dysfunction and hypokinesis of basal to mid anteroseptal with a small aneurysm, patient still having this feeling with generalized weakness and feeling like her legs will give out however denied any chest pain or palpitation.      Patient was found to have emphysema and 3.5 mm right upper lobe pulmonary nodule on CT scan on 9/9, patient is to follow-up with PCP to repeat CT scan.      Interval Problem Update    -Evaluated examined the patient at bedside, feeling worse today, c/o fatigue and dizziness   - on 2-4L, cont. Decadron for COVID   - labs stable, Cr improved from yesterday   - in and out of A.fib, HR fluctuating between 60s-150's. BP stable, cardiology consulted   - cont. Dilt, if persistent RVR, will consider Amio   - will trial small fluid bolus to treat possible dehydration  - will transition to oral anticoagulation    - echo showed normal EF 70%, no valvular dysfunction  - orthostatic vitals  unable to be completed due to patients dizziness  - will get MRI brain to rule out infarct as etiology    - PT/OT recommending post acute- SNF referral placed       I have personally seen and examined the patient at bedside. I discussed the plan of care with patient, bedside RN, charge RN and cardiology.    Consultants/Specialty  cardiology    Code Status  Full Code    Disposition  Patient is not medically cleared.   Anticipate discharge to to skilled nursing facility.  I have placed the appropriate orders for post-discharge needs.    Review of Systems  Review of Systems   Constitutional: Positive for malaise/fatigue. Negative for fever and weight loss.   HENT: Negative for congestion and sore throat.    Eyes: Negative for blurred vision.   Respiratory: Positive for shortness of breath. Negative for cough.    Cardiovascular: Negative for chest pain and palpitations.   Gastrointestinal: Negative for nausea.   Genitourinary: Negative for dysuria.   Neurological: Positive for dizziness and weakness. Negative for tingling, tremors, sensory change, speech change, focal weakness, seizures, loss of consciousness and headaches.   Psychiatric/Behavioral: Negative for depression. The patient is not nervous/anxious.         Physical Exam  Temp:  [36.1 °C (97 °F)-36.4 °C (97.5 °F)] 36.1 °C (97 °F)  Pulse:  [] 130  Resp:  [16-18] 18  BP: ()/(59-68) 118/60  SpO2:  [92 %-96 %] 95 %    Physical Exam  Constitutional:       Appearance: She is not ill-appearing.      Comments: Frail, thin elderly female   HENT:      Head: Normocephalic and atraumatic.      Mouth/Throat:      Mouth: Mucous membranes are dry.      Pharynx: Oropharynx is clear.      Comments: edentulous  Eyes:      General: No scleral icterus.     Extraocular Movements: Extraocular movements intact.      Conjunctiva/sclera: Conjunctivae normal.   Cardiovascular:      Rate and Rhythm: Normal rate. Rhythm irregular.      Heart sounds: No murmur heard.      Pulmonary:      Effort: Pulmonary effort is normal. No respiratory distress.      Breath sounds: No wheezing or rales.   Abdominal:      General: There is no distension.      Tenderness: There is no abdominal tenderness. There is no guarding.   Musculoskeletal:         General: No swelling or deformity.      Cervical back: Neck supple.      Right lower leg: No edema.      Left lower leg: No edema.   Skin:     Coloration: Skin is not jaundiced.      Findings: No bruising or lesion.   Neurological:      General: No focal deficit present.      Mental Status: She is alert and oriented to person, place, and time. Mental status is at baseline.      Cranial Nerves: No cranial nerve deficit.      Sensory: No sensory deficit.      Motor: No weakness.   Psychiatric:         Mood and Affect: Mood normal.         Fluids    Intake/Output Summary (Last 24 hours) at 9/16/2021 1509  Last data filed at 9/16/2021 0900  Gross per 24 hour   Intake 120 ml   Output --   Net 120 ml       Laboratory  Recent Labs     09/15/21  0313   WBC 10.8   RBC 4.69   HEMOGLOBIN 14.0   HEMATOCRIT 42.1   MCV 89.8   MCH 29.9   MCHC 33.3*   RDW 48.4   PLATELETCT 96*   MPV 13.4*     Recent Labs     09/15/21  0313 09/16/21  0029   SODIUM 139 139   POTASSIUM 4.1 4.4   CHLORIDE 108 107   CO2 19* 20   GLUCOSE 108* 108*   BUN 40* 45*   CREATININE 1.23 1.08   CALCIUM 8.5 8.7                   Imaging  EC-ECHOCARDIOGRAM LTD W/O CONT   Final Result      DX-CHEST-LIMITED (1 VIEW)   Final Result         1.  Interstitial pulmonary parenchymal prominence suggest chronic underlying lung disease, component of interstitial edema and/or infiltrates not excluded.   2.  Small left pleural effusion   3.  Atherosclerosis      DX-CHEST-PORTABLE (1 VIEW)   Final Result         No acute cardiac or pulmonary abnormality is identified.      MR-BRAIN-W/O    (Results Pending)        Assessment/Plan  * Dizziness- (present on admission)  Assessment & Plan  Patient states she has  had dizziness for months, previous workup negative including orthostatic vitals, CT head and echocardiogram   Daniele/tachy on medical floor, pt transferred to tele   HR had been relatively stable 60-80's now with increasing RVR. Discussed case with cardiology who recommended against treatment of HR    Repeat echo without acute pathology, EF 70%, no valvular pathology   Orthostatic vitals unable to be done due to dizziness   Will get MRI to evaluate for possible cerebellar infarct   Will given 1L bolus as she does appear dehydrated on exam, monitor volume status   Multifactorial including medications and age and possibly COVID   Supportive care   Fall precautions in place  SNF recommended, referral placed       Chronic kidney disease (CKD) stage G3a/A1, moderately decreased glomerular filtration rate (GFR) between 45-59 mL/min/1.73 square meter and albuminuria creatinine ratio less than 30 mg/g ([facility])  Assessment & Plan  Around baseline  Labs daily  Avoid nephrotoxic medication    COPD (chronic obstructive pulmonary disease) ([facility])  Assessment & Plan  No exacerbation   Chest x-ray showed signs of emphysema with no infiltration   We will start with Spiriva and Symbicort  DuoNeb as needed    Pneumonia due to COVID-19 virus  Assessment & Plan  Came with coughing  Patient needed oxygen 2 L nasal cannula  Decadron 6 mg for 10 days  Follow-up oxygen requirement and inflammation markers  Self prone encouraged  Continuing her from oxygen  COVID precautions      Stage B ACC/AHA asymptomatic heart failure (HCC)- (present on admission)  Assessment & Plan  Patient has history of heart failure with ejection fraction 30%  Repeat echo in 4/2021 and showed improvement of ejection fraction 50%, repeat today shows 70%  EKG and troponin are negative for any ischemic changes  Holding lisinopril and metoprolol due to bradycardia and dizziness  No need for Lasix, euvolmic   Monitor     LBBB (left bundle branch block)- (present on  admission)  Assessment & Plan  Chronic LBBB, tele showing in and out of A.fib   Patient started on Diltiazem with stabilization of heart rate   Will start full dose lovenox in the interim for stroke prevention   Cont. Tele monitoring   Consider cardiology consultation if continues to have rate/rhythm issues     Essential hypertension- (present on admission)  Assessment & Plan  Her medication lisinopril metoprolol and spironolactone were discontinued on admission  Since patient is on high risk for fall and came with dizziness we will continue holding the medications  Close monitoring  BP is stable without       Hyperlipidemia  Assessment & Plan  Continue lipitor 40 mg po qhs     Advanced care planning/counseling discussion  Assessment & Plan  15 minutes spent discussing advanced directives with patient.   States that she would like everything to be done at attempt to prolong her life.    She would like to be full code  Did consult palliative for further discussion given her age/health issues and decline       VTE prophylaxis: SCDs/TEDs and therapeutic anticoagulation with eliquis

## 2021-09-17 NOTE — PROGRESS NOTES
Assumed care of patient @1915, beside report received from Day RN, Pt A&OX4 and denies any pain. Bed locked an lowered with call light in reach and questions answered in present time.

## 2021-09-17 NOTE — PROGRESS NOTES
[facility] Medicine Daily Progress Note    Date of Service  9/17/2021    Chief Complaint  Mayra Veloz is a 81 y.o. female admitted 9/13/2021 with coughing    Hospital Course  81-year-old female with history of COPD, heart failure hypertension and dyslipidemia presented 9/13 with coughing, also generalized weakness, denies significant fever or chills however she still having dizziness, labs did not show leukocytosis and kidney function around baseline, urine did not show any signs of infection, blood culture were negative however COVID-19 test was positive, chest x-ray did not show any infiltration, patient was diagnosed with acute respiratory failure with hypoxia, needed 2 L nasal cannula, dexamethasone was started    Recently patient was admitted for dizziness and vertigo for more than couple months during that admission othostatic was negative, CT head did not show any acute finding, CTA for chest did not show any PE, EKG was sinus rhythm and echo in 4/2021 showed ejection fraction 50% with grade 2 diastolic dysfunction and hypokinesis of basal to mid anteroseptal with a small aneurysm, patient still having this feeling with generalized weakness and feeling like her legs will give out however denied any chest pain or palpitation.      Patient was found to have emphysema and 3.5 mm right upper lobe pulmonary nodule on CT scan on 9/9, patient is to follow-up with PCP to repeat CT scan.      Interval Problem Update    -Evaluated examined the patient at bedside, no new issues, feeling week, denies feeling short of breath despite needed 2-4 L of O2, cont. Decadron for COVID   - in and out of A.fib, HR fluctuating between 60s-130's. BP stable  - given 500 ml bolus + 50 ml/hr IVF for dehydration   - cont. Dilt with parameters  - MRI negative for acute stroke, remote left frontal/parietal lacunar infarct unlikely etiology of symptoms   - PT/OT recommending post acute- SNF referral placed, request frequent therapy to  hasten discharge plan       Pt needs SNF, however is will not be able to transfer until 10-14 days post covid positive test (9/23-26), this will prolong hospital stay unless we can get her stronger while here for dc home with HH.     I have personally seen and examined the patient at bedside. I discussed the plan of care with patient, bedside RN, charge RN and cardiology.    Consultants/Specialty  cardiology    Code Status  Full Code    Disposition  Patient is not medically cleared.   Anticipate discharge to to skilled nursing facility.  I have placed the appropriate orders for post-discharge needs.    Review of Systems  Review of Systems   Constitutional: Positive for malaise/fatigue. Negative for fever and weight loss.   HENT: Negative for congestion and sore throat.    Eyes: Negative for blurred vision.   Respiratory: Positive for shortness of breath. Negative for cough.    Cardiovascular: Negative for chest pain and palpitations.   Gastrointestinal: Negative for nausea.   Genitourinary: Negative for dysuria.   Neurological: Positive for dizziness and weakness. Negative for tingling, tremors, sensory change, speech change, focal weakness, seizures, loss of consciousness and headaches.   Psychiatric/Behavioral: Negative for depression. The patient is not nervous/anxious.         Physical Exam  Temp:  [36.2 °C (97.2 °F)-37 °C (98.6 °F)] 36.6 °C (97.9 °F)  Pulse:  [] 95  Resp:  [17-18] 18  BP: (112-127)/(55-72) 119/55  SpO2:  [94 %-96 %] 96 %    Physical Exam  Constitutional:       Appearance: She is not ill-appearing.      Comments: Frail, thin elderly female   HENT:      Head: Normocephalic and atraumatic.      Mouth/Throat:      Mouth: Mucous membranes are dry.      Pharynx: Oropharynx is clear.      Comments: edentulous  Eyes:      General: No scleral icterus.     Extraocular Movements: Extraocular movements intact.      Conjunctiva/sclera: Conjunctivae normal.   Cardiovascular:      Rate and Rhythm:  Normal rate. Rhythm irregular.      Heart sounds: No murmur heard.     Pulmonary:      Effort: Pulmonary effort is normal. No respiratory distress.      Breath sounds: No wheezing or rales.   Abdominal:      General: There is no distension.      Tenderness: There is no abdominal tenderness. There is no guarding.   Musculoskeletal:         General: No swelling or deformity.      Cervical back: Neck supple.      Right lower leg: No edema.      Left lower leg: No edema.   Skin:     Coloration: Skin is not jaundiced.      Findings: No bruising or lesion.   Neurological:      General: No focal deficit present.      Mental Status: She is alert and oriented to person, place, and time. Mental status is at baseline.      Cranial Nerves: No cranial nerve deficit.      Sensory: No sensory deficit.      Motor: No weakness.   Psychiatric:         Mood and Affect: Mood normal.         Fluids  No intake or output data in the 24 hours ending 09/17/21 1200    Laboratory  Recent Labs     09/15/21  0313   WBC 10.8   RBC 4.69   HEMOGLOBIN 14.0   HEMATOCRIT 42.1   MCV 89.8   MCH 29.9   MCHC 33.3*   RDW 48.4   PLATELETCT 96*   MPV 13.4*     Recent Labs     09/15/21  0313 09/16/21  0029   SODIUM 139 139   POTASSIUM 4.1 4.4   CHLORIDE 108 107   CO2 19* 20   GLUCOSE 108* 108*   BUN 40* 45*   CREATININE 1.23 1.08   CALCIUM 8.5 8.7                   Imaging  MR-BRAIN-W/O   Final Result         No acute intracranial process.      Nonspecific deep white matter changes related to chronic microvascular ischemia. Remote left medial parietal lacunar infarct.      EC-ECHOCARDIOGRAM LTD W/O CONT   Final Result      DX-CHEST-LIMITED (1 VIEW)   Final Result         1.  Interstitial pulmonary parenchymal prominence suggest chronic underlying lung disease, component of interstitial edema and/or infiltrates not excluded.   2.  Small left pleural effusion   3.  Atherosclerosis      DX-CHEST-PORTABLE (1 VIEW)   Final Result         No acute cardiac or  pulmonary abnormality is identified.           Assessment/Plan  * Dizziness- (present on admission)  Assessment & Plan  Patient states she has had dizziness for months, previous workup negative including orthostatic vitals, CT head and echocardiogram   Daniele/tachy on medical floor, pt transferred to tele   HR fluctuating between 60's-130's Discussed case with cardiology who recommended against treatment of HR    Will rehydrated   Repeat echo without acute pathology, EF 70%, no valvular pathology   MRI negative for acute infarct   Multifactorial including medications and age and possibly COVID   Supportive care   Fall precautions in place  SNF recommended, referral placed       Chronic kidney disease (CKD) stage G3a/A1, moderately decreased glomerular filtration rate (GFR) between 45-59 mL/min/1.73 square meter and albuminuria creatinine ratio less than 30 mg/g ([facility])  Assessment & Plan  Around baseline  Labs daily  Avoid nephrotoxic medication    COPD (chronic obstructive pulmonary disease) ([facility])  Assessment & Plan  No exacerbation   Chest x-ray showed signs of emphysema with no infiltration   We will start with Spiriva and Symbicort  DuoNeb as needed    Pneumonia due to COVID-19 virus  Assessment & Plan  Came with coughing  Patient non 2-4L nasal cannula  Decadron 6 mg for 10 days  Follow-up oxygen requirement and inflammation markers  Self prone encouraged  Continuing her from oxygen  COVID precautions      Stage B ACC/AHA asymptomatic heart failure (HCC)- (present on admission)  Assessment & Plan  Patient has history of heart failure with ejection fraction 30%  Repeat echo in 4/2021 and showed improvement of ejection fraction 50%, repeat today shows 70%  EKG and troponin are negative for any ischemic changes  Holding lisinopril and metoprolol due to bradycardia and dizziness  No need for Lasix, giving careful fluids   Monitor     LBBB (left bundle branch block)- (present on admission)  Assessment & Plan  Chronic  LBBB, tele showing in and out of A.fib   Patient started on Diltiazem with stabilization of heart rate   Started on Eliquis for stroke prevention  Cont. Tele monitoring     Essential hypertension- (present on admission)  Assessment & Plan  Her medication lisinopril metoprolol and spironolactone were discontinued on admission  Since patient is on high risk for fall and came with dizziness we will continue holding the medications  Close monitoring  BP is stable without       Hyperlipidemia  Assessment & Plan  Continue lipitor 40 mg po qhs     Advanced care planning/counseling discussion  Assessment & Plan  15 minutes spent discussing advanced directives with patient.   States that she would like everything to be done at attempt to prolong her life.    She would like to be full code  Did consult palliative for further discussion given her age/health issues and decline       VTE prophylaxis: SCDs/TEDs and therapeutic anticoagulation with eliquis

## 2021-09-17 NOTE — PROGRESS NOTES
Monitor Summary   Atrial Fibrillation   Rate    PVC COUP/BIGEM/TRIG   -/.12/-    12 hour chart check

## 2021-09-17 NOTE — CASE COMMUNICATION
I agree with these changes  Nanda Bullock RN  ----- Message -----  From: Monet Hdz R.N.  Sent: 9/15/2021  12:45 PM PDT  To: Nanda Bullock R.N.      Quality Review for 9.11.21 SOC [facility]ASIS performed on by DIANNE Hdz RN on 9.15, 2021:    Edits completed by DIANNE Hdz RN:  1. Added poor ambulation to HB status per narrative  2. Added #1 to  pt fell and hurt self at [facility]. Added #6 pt uses tobacco  3. Changed  to 4 per cg narrative  4. Changed  to 2 per resp tab pt has dyspnea on exertion  5. Changed  to 3 per ambulation score   6. Changed  to 0   7. Added ambulation to functional limitations. Added ambulate only with assistance, fall risk to safety measures. Added heart healthy diet to nutritional requirements per dxs  8. Updated F2F data  9. Added [facility] referral for COPD  10. Added COPD and HTN

## 2021-09-17 NOTE — CARE PLAN
Problem: Nutritional:  Goal: Achieve adequate nutritional intake  Description: Patient will consume >50% of meals and supplements  Outcome: Progressing     Diet order is easy to chew diet and Boost Plus TID. PO 0-100% for 4 meals (0-<25% for 3) per flow sheet since 9/14. RN reports pt is drinking one and a half Boost Plus supplements per day. RD will continue to send Boost Plus BID. Boost Plus BID provides 720 calories (~59% of estimated calorie needs) and 28 grams of protein (~64% of estimated protein needs) per day.

## 2021-09-17 NOTE — CARE PLAN
The patient is Watcher - Medium risk of patient condition declining or worsening    Shift Goals  Clinical Goals: free of falls   Patient Goals: manage dizziness  Progress made toward(s) clinical / shift goals: Patient has remained free of falls or injury.     Patient is not progressing towards the following goals:N/A       Problem: Knowledge Deficit - Standard  Goal: Patient and family/care givers will demonstrate understanding of plan of care, disease process/condition, diagnostic tests and medications  Outcome: Progressing  Note: Patient educated on COVID. Educated on oxygen therapy. Educated on nutrition.      Problem: Fall Risk  Goal: Patient will remain free from falls  Outcome: Progressing  Note: Patient has all fall precautions in place and calls appropriately.

## 2021-09-18 NOTE — FLOWSHEET NOTE
Telemetry Shift Summary     Rhythm: Afib, BBB  HR Range:    Ectopy: Trigem, Bigem, couplet, frequent PVC  Measurements: -/0.13/-              Normal Values  Rhythm SR  HR Range    Measurements 0.12-0.20 / 0.06-0.10  / 0.30-0.52

## 2021-09-18 NOTE — DIETARY
Nutrition Services: Update   Contacted by NR that RN reports MD offered pt milkshakes due to poor PO intake and pt would like to try milkshakes. RD ordered imilkshakes BID in addition to current order of Boost Plus BID with meals.    RD continues to follow.

## 2021-09-18 NOTE — PROGRESS NOTES
[facility] Medicine Daily Progress Note    Date of Service  9/18/2021    Chief Complaint  Mayra Veloz is a 81 y.o. female admitted 9/13/2021 with coughing    Hospital Course  81-year-old female with history of COPD, heart failure hypertension and dyslipidemia presented 9/13 with coughing, also generalized weakness, denies significant fever or chills however she still having dizziness, labs did not show leukocytosis and kidney function around baseline, urine did not show any signs of infection, blood culture were negative however COVID-19 test was positive, chest x-ray did not show any infiltration, patient was diagnosed with acute respiratory failure with hypoxia, needed 2 L nasal cannula, dexamethasone was started    Recently patient was admitted for dizziness and vertigo for more than couple months during that admission othostatic was negative, CT head did not show any acute finding, CTA for chest did not show any PE, EKG was sinus rhythm and echo in 4/2021 showed ejection fraction 50% with grade 2 diastolic dysfunction and hypokinesis of basal to mid anteroseptal with a small aneurysm, patient still having this feeling with generalized weakness and feeling like her legs will give out however denied any chest pain or palpitation.      Patient was found to have emphysema and 3.5 mm right upper lobe pulmonary nodule on CT scan on 9/9, patient is to follow-up with PCP to repeat CT scan.      Interval Problem Update    -Evaluated examined the patient at bedside, poor appetite due to food tasting poorly, encourage oral intake, pt okay to have food brought in if needed   - O2 needs up to 5L, repeat xray shows new b/l lobe infiltrate vs consolidation, will check pro calcitonin increasing, consider  abx  - A.fib, current rate controlled, continue dilt   - cont. 50 ml/hr IVF for dehydration and poor oral intake, monitor volume and respiratory status   - MRI negative for acute stroke, remote left frontal/parietal  lacunar infarct unlikely etiology of symptoms   - PT/OT recommending post acute- SNF referral placed, request frequent therapy to hasten discharge plan   - palliative care consulted    Pt needs SNF, however is will not be able to transfer until 10-14 days post covid positive test (9/23-26), this will prolong hospital stay unless we can get her stronger while here for dc home with HH.     I have personally seen and examined the patient at bedside. I discussed the plan of care with patient, bedside RN, charge RN and cardiology.    Consultants/Specialty  cardiology    Code Status  Full Code    Disposition  Patient is not medically cleared.   Anticipate discharge to to skilled nursing facility.  I have placed the appropriate orders for post-discharge needs.    Review of Systems  Review of Systems   Constitutional: Positive for malaise/fatigue. Negative for fever and weight loss.   HENT: Negative for congestion and sore throat.    Eyes: Negative for blurred vision.   Respiratory: Positive for shortness of breath. Negative for cough.    Cardiovascular: Negative for chest pain and palpitations.   Gastrointestinal: Negative for nausea.   Genitourinary: Negative for dysuria.   Neurological: Positive for dizziness and weakness. Negative for tingling, tremors, sensory change, speech change, focal weakness, seizures, loss of consciousness and headaches.   Psychiatric/Behavioral: Negative for depression. The patient is not nervous/anxious.         Physical Exam  Temp:  [35.9 °C (96.7 °F)-37.1 °C (98.8 °F)] 35.9 °C (96.7 °F)  Pulse:  [] 102  Resp:  [15-18] 17  BP: (124-145)/(60-77) 143/75  SpO2:  [89 %-92 %] 89 %    Physical Exam  Constitutional:       Appearance: She is not ill-appearing.      Comments: Frail, thin elderly female   HENT:      Head: Normocephalic and atraumatic.      Mouth/Throat:      Mouth: Mucous membranes are dry.      Pharynx: Oropharynx is clear.      Comments: edentulous  Eyes:      General: No  scleral icterus.     Extraocular Movements: Extraocular movements intact.      Conjunctiva/sclera: Conjunctivae normal.   Cardiovascular:      Rate and Rhythm: Normal rate. Rhythm irregular.      Heart sounds: No murmur heard.     Pulmonary:      Effort: Pulmonary effort is normal. No respiratory distress.      Breath sounds: No wheezing or rales.   Abdominal:      General: There is no distension.      Tenderness: There is no abdominal tenderness. There is no guarding.   Musculoskeletal:         General: No swelling or deformity.      Cervical back: Neck supple.      Right lower leg: No edema.      Left lower leg: No edema.   Skin:     Coloration: Skin is not jaundiced.      Findings: No bruising or lesion.   Neurological:      General: No focal deficit present.      Mental Status: She is alert and oriented to person, place, and time. Mental status is at baseline.      Cranial Nerves: No cranial nerve deficit.      Sensory: No sensory deficit.      Motor: No weakness.   Psychiatric:         Mood and Affect: Mood normal.         Fluids  No intake or output data in the 24 hours ending 09/18/21 1321    Laboratory      Recent Labs     09/16/21  0029 09/18/21  0443   SODIUM 139 141   POTASSIUM 4.4 4.7   CHLORIDE 107 112   CO2 20 19*   GLUCOSE 108* 126*   BUN 45* 57*   CREATININE 1.08 1.03   CALCIUM 8.7 8.4*                   Imaging  DX-CHEST-LIMITED (1 VIEW)   Final Result      1.  New bibasilar pulmonary opacities which could be related to atelectasis and/or pneumonia.   2.  Small left pleural effusion.      MR-BRAIN-W/O   Final Result         No acute intracranial process.      Nonspecific deep white matter changes related to chronic microvascular ischemia. Remote left medial parietal lacunar infarct.      EC-ECHOCARDIOGRAM LTD W/O CONT   Final Result      DX-CHEST-LIMITED (1 VIEW)   Final Result         1.  Interstitial pulmonary parenchymal prominence suggest chronic underlying lung disease, component of  interstitial edema and/or infiltrates not excluded.   2.  Small left pleural effusion   3.  Atherosclerosis      DX-CHEST-PORTABLE (1 VIEW)   Final Result         No acute cardiac or pulmonary abnormality is identified.           Assessment/Plan  * Dizziness- (present on admission)  Assessment & Plan  Patient states she has had dizziness for months, previous workup negative including orthostatic vitals, CT head and echocardiogram   Daniele/tachy on medical floor, pt transferred to [facility]   HR fluctuating between 60's-130's Discussed case with cardiology who recommended against treatment of HR    Will rehydrated   Repeat echo without acute pathology, EF 70%, no valvular pathology   MRI negative for acute infarct   Multifactorial including medications and age and possibly COVID   Supportive care   Fall precautions in place  SNF recommended, referral placed       Chronic kidney disease (CKD) stage G3a/A1, moderately decreased glomerular filtration rate (GFR) between 45-59 mL/min/1.73 square meter and albuminuria creatinine ratio less than 30 mg/g ([facility])  Assessment & Plan  Around baseline  Labs daily  Avoid nephrotoxic medication    COPD (chronic obstructive pulmonary disease) ([facility])  Assessment & Plan  No exacerbation   Chest x-ray showed signs of emphysema with no infiltration on admit   Repeat xray 9/18 with b/l lower lobe infiltrates  Cont. with Spiriva and Symbicort  DuoNeb as needed    Pneumonia due to COVID-19 virus  Assessment & Plan  Came with coughing  Patient non 4-5L nasal cannula  Decadron 6 mg for 10 days  Follow-up oxygen requirement and inflammation markers  Self prone encouraged  Continuing her from oxygen  COVID precautions  Recheck pro-dread given new infiltrates on imaging, if positive consider abx       Stage B ACC/AHA asymptomatic heart failure (HCC)- (present on admission)  Assessment & Plan  Patient has history of heart failure with ejection fraction 30%  Repeat echo in 4/2021 and showed improvement of  ejection fraction 50%, repeat today shows 70%  EKG and troponin are negative for any ischemic changes  Holding lisinopril and metoprolol due to bradycardia and dizziness  No need for Lasix, giving careful fluids   Monitor     LBBB (left bundle branch block)- (present on admission)  Assessment & Plan  Chronic LBBB, tele showing in and out of A.fib   Patient started on Diltiazem with stabilization of heart rate   Started on Eliquis for stroke prevention  Cont. Tele monitoring     Essential hypertension- (present on admission)  Assessment & Plan  Her medication lisinopril metoprolol and spironolactone were discontinued on admission  Since patient is on high risk for fall and came with dizziness we will continue holding the medications  Close monitoring  BP is stable without       Hyperlipidemia  Assessment & Plan  Continue lipitor 40 mg po qhs     Advanced care planning/counseling discussion  Assessment & Plan  15 minutes spent discussing advanced directives with patient.   States that she would like everything to be done at attempt to prolong her life.    She would like to be full code  Did consult palliative for further discussion given her age/health issues and decline       VTE prophylaxis: SCDs/TEDs and therapeutic anticoagulation with eliquis

## 2021-09-18 NOTE — CONSULTS
"Reason for PC Consult: Advance Care Planning    Consulted by: Dr Cheema    Assessment:  General: 82 yo female admitted for Pneumonia due to COVID-19 virus, Acute hypoxemic respiratory failure due to COVID-19  PMH: Dyslipidemia, Osteoarthritis, Osteoporosis,Thrombocytopenia-    Recent admit  through  for Syncope     Pt presented to the ED due worsening cough, SOB, chest pain, and dizziness. Pt stated that she had a recent admit due to a GLF at the grocery store. She stated that she has not been eating or drinking well but denies any N/V, she reports diarrhea.     Social: Pt lives alone and says her son, Arden, lives very close by and helps her with all of her errands and needs. She says that her   in 2019 of cancer.     Consults:   Cardiology  Palliative Care     Dyspnea: No- pt denies, 89% on 5 L NC  Last BM: 21-    Pain: No- pt denies   Depression: No-    Dementia: No;       Spiritual:  Is Anglican or spirituality important for coping with this illness? No-    Has a  or spiritual provider visit been requested? No    Palliative Performance Scale: 50%    Advance Directive: None on file   DPOA: No, Arden Veloz [phone], son   POL: None on file      Code Status: Full-      Outcome:  Met with the pt at the bedside,  Introduced self and the role of Palliative Care. We spoke about her admission for COVID. She tells me that her son does \"not believe in COVID, he thinks, you know, it's not real, It's real\", she laments that she should have gotten the vaccine, but was afraid it would make her feel worse as she reports a chronic cough \"for months now\".     We spoke about her weight which is low, BMI 15.64. She tells me that during the 6 months that her  took to die she \"lost her appetite\" and started to lose weight at that time and \"just never stopped, nothing tastes good anymore\".   Validated the difficulty of losing her spouse and the resulting struggles over the " "past months.     We spoke about code status. Explained Full code, CPR, intubation versus DNR and DNI. She says she wants to be a Full code \"for sure!\" and wishes to continue with all and any treatments.    She tells me that she was recently diagnosed in the last yea with HF and that she wishes to continue any treatment. Explained that DNR/DNI does not preclude treatment, but she again says that she wishes to remain a full code at this time.   Let her know I will update the MD.   She tells me that her son is a \"big help\" to her and that he was always at the house working on cars with her  while he was alive. She feels that she has a support system with him and tells me that she has not needs.   We spoke about her current DC plan to either regain strength and DC to home with HH or to SNF if unable. She is not interested in Hospice support in the home and her goal is to gain strength and \"go home\".   Provided with Palliative Care contact number and let her know to call for any needs.     Recommendations: I do not recommend an ethics or hospice consult at this time because as pt wishs to continue with curative treatment at this time .    Updated: Dr Cehema, Bedside RN Christy     Plan: Pt to DC to SNF versus home with HH depending on strength regained while awaiting clearance from COVID+ date or 9/13    Thank you for allowing Palliative Care to participate in this patient's care. Please feel free to call [phone] with any questions or concerns.  "

## 2021-09-19 PROBLEM — E43 SEVERE PROTEIN-CALORIE MALNUTRITION (HCC): Status: ACTIVE | Noted: 2021-01-01

## 2021-09-19 PROBLEM — J96.01 ACUTE RESPIRATORY FAILURE WITH HYPOXIA (HCC): Status: ACTIVE | Noted: 2021-01-01

## 2021-09-19 NOTE — ASSESSMENT & PLAN NOTE
Patient has severe protein calorie malnourishment  BMI of 13-->16  Poor oral intake secondary to food tasting poorly, continue to encourage adequate nutrition provide patient preference if possible  -Dietary consulted  -Palliative consulted

## 2021-09-19 NOTE — PROGRESS NOTES
[facility] Medicine Daily Progress Note    Date of Service  9/19/2021    Chief Complaint  Mayra Veloz is a 81 y.o. female admitted 9/13/2021 with coughing    Hospital Course  81-year-old female with history of COPD, heart failure hypertension and dyslipidemia presented 9/13 with coughing, also generalized weakness, denies significant fever or chills however she still having dizziness, labs did not show leukocytosis and kidney function around baseline, urine did not show any signs of infection, blood culture were negative however COVID-19 test was positive, chest x-ray did not show any infiltration, patient was diagnosed with acute respiratory failure with hypoxia, needed 2 L nasal cannula, dexamethasone was started    Recently patient was admitted for dizziness and vertigo for more than couple months during that admission othostatic was negative, CT head did not show any acute finding, CTA for chest did not show any PE, EKG was sinus rhythm and echo in 4/2021 showed ejection fraction 50% with grade 2 diastolic dysfunction and hypokinesis of basal to mid anteroseptal with a small aneurysm, patient still having this feeling with generalized weakness and feeling like her legs will give out however denied any chest pain or palpitation.      Patient was found to have emphysema and 3.5 mm right upper lobe pulmonary nodule on CT scan on 9/9, patient is to follow-up with PCP to repeat CT scan.      Interval Problem Update    -Evaluated examined the patient at bedside, still with poor oral intake, increasing o2 needs   - now requiring 14-15L mask , worsening CXR likely secondary to COVID-19 infection   - continue decadron,  discussed with ID use of Remdesivir, will start, monitor CMP  - pro dread elevated but improved from previous, will not initiate abx at this time   - A.fib, current rate controlled, continue dilt   - cont. 50 ml/hr IVF for dehydration and poor oral intake, monitor volume and respiratory status   -  attempted to reach son over phone for update, no answer     - PT/OT recommending post acute- SNF referral placed, request frequent therapy to hasten discharge plan   - palliative care consulted, pt wishes to remain FULL CODE    Pt needs SNF, however is will not be able to transfer until 10-14 days post covid positive test (9/23-26), this will prolong hospital stay unless we can get her stronger while here for dc home with HH. Now with increasing o2 needs, not medically clear     I have personally seen and examined the patient at bedside. I discussed the plan of care with patient, bedside RN, charge RN and cardiology.    Consultants/Specialty  cardiology    Code Status  Full Code    Disposition  Patient is not medically cleared.   Anticipate discharge to to skilled nursing facility.  I have placed the appropriate orders for post-discharge needs.    Review of Systems  Review of Systems   Constitutional: Positive for malaise/fatigue. Negative for fever and weight loss.   HENT: Negative for congestion and sore throat.    Eyes: Negative for blurred vision.   Respiratory: Positive for shortness of breath. Negative for cough.    Cardiovascular: Negative for chest pain and palpitations.   Gastrointestinal: Negative for nausea.   Genitourinary: Negative for dysuria.   Neurological: Positive for dizziness and weakness. Negative for tingling, tremors, sensory change, speech change, focal weakness, seizures, loss of consciousness and headaches.   Psychiatric/Behavioral: Negative for depression. The patient is not nervous/anxious.         Physical Exam  Temp:  [35.9 °C (96.7 °F)-36.8 °C (98.3 °F)] 35.9 °C (96.7 °F)  Pulse:  [] 91  Resp:  [16-20] 18  BP: (125-152)/(68-77) 126/68  SpO2:  [88 %-92 %] 92 %    Physical Exam  Constitutional:       Appearance: She is ill-appearing. She is not toxic-appearing.      Comments: Frail, thin elderly female   HENT:      Head: Normocephalic and atraumatic.      Mouth/Throat:      Mouth:  Mucous membranes are dry.      Pharynx: Oropharynx is clear.      Comments: edentulous  Eyes:      General: No scleral icterus.     Extraocular Movements: Extraocular movements intact.      Conjunctiva/sclera: Conjunctivae normal.   Cardiovascular:      Rate and Rhythm: Normal rate. Rhythm irregular.      Heart sounds: No murmur heard.     Pulmonary:      Effort: Pulmonary effort is normal. No respiratory distress.      Breath sounds: No wheezing or rales (RLL>LLL).   Abdominal:      General: There is no distension.      Palpations: Abdomen is soft.      Tenderness: There is no abdominal tenderness. There is no guarding.   Musculoskeletal:         General: No swelling or deformity.      Cervical back: Neck supple.      Right lower leg: No edema.      Left lower leg: No edema.   Skin:     Coloration: Skin is not jaundiced.      Findings: No bruising or lesion.   Neurological:      General: No focal deficit present.      Mental Status: She is alert and oriented to person, place, and time. Mental status is at baseline.      Cranial Nerves: No cranial nerve deficit.      Sensory: No sensory deficit.      Motor: No weakness.   Psychiatric:         Mood and Affect: Mood normal.         Behavior: Behavior normal.         Thought Content: Thought content normal.         Fluids  No intake or output data in the 24 hours ending 09/19/21 1105    Laboratory  Recent Labs     09/19/21  0234   WBC 11.9*   RBC 4.38   HEMOGLOBIN 13.2   HEMATOCRIT 40.4   MCV 92.2   MCH 30.1   MCHC 32.7*   RDW 52.3*   PLATELETCT 111*   MPV 13.2*     Recent Labs     09/18/21  0443 09/19/21  0234   SODIUM 141 141   POTASSIUM 4.7 4.7   CHLORIDE 112 112   CO2 19* 18*   GLUCOSE 126* 126*   BUN 57* 50*   CREATININE 1.03 0.95   CALCIUM 8.4* 8.1*                   Imaging  DX-CHEST-LIMITED (1 VIEW)   Final Result      1.  New bibasilar pulmonary opacities which could be related to atelectasis and/or pneumonia.   2.  Small left pleural effusion.       MR-BRAIN-W/O   Final Result         No acute intracranial process.      Nonspecific deep white matter changes related to chronic microvascular ischemia. Remote left medial parietal lacunar infarct.      EC-ECHOCARDIOGRAM LTD W/O CONT   Final Result      DX-CHEST-LIMITED (1 VIEW)   Final Result         1.  Interstitial pulmonary parenchymal prominence suggest chronic underlying lung disease, component of interstitial edema and/or infiltrates not excluded.   2.  Small left pleural effusion   3.  Atherosclerosis      DX-CHEST-PORTABLE (1 VIEW)   Final Result         No acute cardiac or pulmonary abnormality is identified.           Assessment/Plan  * Dizziness- (present on admission)  Assessment & Plan  Patient states she has had dizziness for months, previous workup negative including orthostatic vitals, CT head and echocardiogram   Danieel/tachy on medical floor, pt transferred to tele   HR fluctuating between 60's-130's Discussed case with cardiology who recommended against treatment of HR    Will rehydrate   Repeat echo without acute pathology, EF 70%, no valvular pathology   MRI negative for acute infarct   Multifactorial including medications and age and possibly COVID   Supportive care   Fall precautions in place  SNF recommended, referral placed       Severe protein-calorie malnutrition (HCC)- (present on admission)  Assessment & Plan  Patient has severe protein calorie malnourishment  BMI of 13  Poor oral intake secondary to food tasting poorly, continue to encourage adequate nutrition provide patient preference if possible  -Dietary consulted  -Palliative consulted    Acute respiratory failure with hypoxia (HCC)- (present on admission)  Assessment & Plan  Acute respiratory failure on admission requiring 2 to 4 L of supplemental oxygen  Worsening respiratory failure now on 15 L of oxygen suspect this is secondary to worsening COVID-19 infection  Patient is not vaccinated  -Currently on Decadron, will add  remdesivir today 9/19/2021  -Chest x-ray does show increasing pulmonary infiltrates, procalcitonin is elevated but improved from admission we will not initiate antibiotics at this time  -RT protocol, wean O2 as able  -Encourage mobility and I-S and proning as tolerated    Chronic kidney disease (CKD) stage G3a/A1, moderately decreased glomerular filtration rate (GFR) between 45-59 mL/min/1.73 square meter and albuminuria creatinine ratio less than 30 mg/g ([facility])  Assessment & Plan  Around baseline  Labs daily  Avoid nephrotoxic medication    COPD (chronic obstructive pulmonary disease) ([facility])  Assessment & Plan  No exacerbation   Chest x-ray showed signs of emphysema with no infiltration on admit   Repeat xray 9/18 with b/l lower lobe infiltrates, suspect worsening COVID-19 PNA  Cont. with Spiriva and Symbicort  DuoNeb as needed    Pneumonia due to COVID-19 virus  Assessment & Plan  Came with coughing  Increasing oxygen needs, now on 15L mask   Decadron 6 mg for 10 days, start Remdesivir 9/19  Self prone encouraged  Continuing O2, wean as able   COVID precautions  Procal elevated but improving, will hold on abx at this time       Stage B ACC/AHA asymptomatic heart failure (HCC)- (present on admission)  Assessment & Plan  Patient has history of heart failure with ejection fraction 30%  Repeat echo in 4/2021 and showed improvement of ejection fraction 50%, repeat today shows 70%  EKG and troponin are negative for any ischemic changes  Holding lisinopril and metoprolol due to bradycardia and dizziness  No need for Lasix, giving careful fluids   Monitor     LBBB (left bundle branch block)- (present on admission)  Assessment & Plan  Chronic LBBB, tele showing in and out of A.fib   Patient started on Diltiazem with stabilization of heart rate   Started on Eliquis for stroke prevention  Cont. Tele monitoring     Essential hypertension- (present on admission)  Assessment & Plan  Her medication lisinopril metoprolol and  spironolactone were discontinued on admission  Since patient is on high risk for fall and came with dizziness we will continue holding the medications  Close monitoring  BP is stable without       Hyperlipidemia  Assessment & Plan  Continue lipitor 40 mg po qhs     Advanced care planning/counseling discussion  Assessment & Plan  15 minutes spent discussing advanced directives with patient.   States that she would like everything to be done at attempt to prolong her life.    She would like to be full code  Did consult palliative for further discussion given her age/health issues and decline, pt wishes to maintain FULL CODE status       VTE prophylaxis: SCDs/TEDs and therapeutic anticoagulation with eliquis    I have performed a physical exam and reviewed and updated ROS and Plan today (9/19/2021). In review of yesterday's note (9/18/2021), there are no changes except as documented above

## 2021-09-19 NOTE — ASSESSMENT & PLAN NOTE
Acute respiratory failure on admission requiring 2 to 4 L of supplemental oxygen  Worsening respiratory failure now on 60L high flow suspect this is secondary to worsening COVID-19 infection  Patient is not vaccinated  -Currently on Decadron x 10 days, Baracitinib 9/21-**  -Chest x-ray does show increasing pulmonary infiltrates, procalcitonin is elevated but improved from admission we will not initiate antibiotics at this time  -RT protocol, wean O2 as able  -Encourage mobility and I-S and proning as tolerated  - lasix IV 9/21  - Patient is critically ill, greater than 36 minutes of critical care time were spent in the monitoring resp status on HFNC, examining patient, chart review regarding resp history/status, planning, and management of this patient not including procedures without overlap.

## 2021-09-19 NOTE — PROGRESS NOTES
Assumed care of patient @1915, beside report received from José RN, Pt A&OX4 and denies any pain. Bed locked an lowered with call light in reach and questions answered in present time.

## 2021-09-20 NOTE — THERAPY
"Occupational Therapy  Daily Treatment     Patient Name: Mayra Veloz  Age:  81 y.o., Sex:  female  Medical Record #: [id number]  Today's Date: 9/20/2021       Precautions: Fall Risk      Assessment    Pt seen for OT tx. Continues to be limited by decreased activity tolerance, increased O2 demands, balance deficits, inattention, poor safety awareness and generalized weakness impacting ability to complete ADLs and ADL transfers independently. Pt reported feeling \"tired and weak\" upon arrival but agreeable to try OOB activity. Min A supine > sit, pt reported feeling fatigued and requested to return BTB. Supv rolling in bed, max A pericare. Will continue to benefit from OT services while in house.     Plan    Continue current treatment plan.    DC Equipment Recommendations: Unable to determine at this time  Discharge Recommendations: Recommend post-acute placement for additional occupational therapy services prior to discharge home    S   09/20/21 0925   Balance   Sitting Balance (Static) Fair   Sitting Balance (Dynamic) Fair   Weight Shift Sitting Fair   Bed Mobility    Supine to Sit Minimal Assist   Sit to Supine Supervised   Scooting Supervised   Activities of Daily Living   Grooming Supervision;Seated   Upper Body Dressing Minimal Assist   Lower Body Dressing Minimal Assist   Toileting Maximal Assist   Functional Mobility   Comments did not assess this session   Short Term Goals   Short Term Goal # 1 Pt will complete ADL transfers with supervision   Goal Outcome # 1 Progressing slower than expected   Short Term Goal # 2 Pt will complete LB dressing with supervision   Goal Outcome # 2 Progressing slower than expected   Anticipated Discharge Equipment and Recommendations   DC Equipment Recommendations Unable to determine at this time   Discharge Recommendations Recommend post-acute placement for additional occupational therapy services prior to discharge home       "

## 2021-09-20 NOTE — THERAPY
Missed Therapy     Patient Name: Mayra Veloz  Age:  81 y.o., Sex:  female  Medical Record #: [id number]  Today's Date: 9/20/2021    Discussed missed therapy with BECKY Sanford       09/20/21 0949   Interdisciplinary Plan of Care Collaboration   Collaboration Comments Pt just finished working with [facility] and she was unable to stand at EOB due to fatigue/weakness and was barely able to tolerate sitting EOB. Will re-attempt as able later in PM/next day.

## 2021-09-20 NOTE — PROGRESS NOTES
[facility] Medicine Daily Progress Note    Date of Service  9/20/2021    Chief Complaint  Mayra Veloz is a 81 y.o. female admitted 9/13/2021 with coughing    Hospital Course  81-year-old female with history of COPD, heart failure hypertension and dyslipidemia presented 9/13 with coughing, also generalized weakness, denies significant fever or chills however she still having dizziness, labs did not show leukocytosis and kidney function around baseline, urine did not show any signs of infection, blood culture were negative however COVID-19 test was positive, chest x-ray did not show any infiltration, patient was diagnosed with acute respiratory failure with hypoxia, needed 2 L nasal cannula, dexamethasone was started    Recently patient was admitted for dizziness and vertigo for more than couple months during that admission othostatic was negative, CT head did not show any acute finding, CTA for chest did not show any PE, EKG was sinus rhythm and echo in 4/2021 showed ejection fraction 50% with grade 2 diastolic dysfunction and hypokinesis of basal to mid anteroseptal with a small aneurysm, patient still having this feeling with generalized weakness and feeling like her legs will give out however denied any chest pain or palpitation.      Patient was found to have emphysema and 3.5 mm right upper lobe pulmonary nodule on CT scan on 9/9, patient is to follow-up with PCP to repeat CT scan.      Interval Problem Update    -patient seen and examined at bedside, still with poor oral intake, increasing o2 needs, now on high flow oxygen 40L  - continue decadron,  No longer candidate for Remdesivir, may be candidate for tociluzimab pending CRP or if lymphocyte count improves maybe barcitinib, appreciate ID's input   - pro dread elevated but improved from previous, will not initiate abx at this time   - A.fib, current rate controlled, continue dilt   - despite poor oral intake, given inreased O2 needs will dc fluids and  "trial small dose of IV lasix   - attempted to reach son over phonex 2 days, 2 messages left, no response     Discussed with patient again her goals of care given her declining conditions, patient states \"I dont know what to do\", with further discussion she continues to want to be FULL code. Will continue to address if her condition continues to decline.     I have personally seen and examined the patient at bedside. I discussed the plan of care with patient, bedside RN and charge RN.    Consultants/Specialty  cardiology    Code Status  Full Code    Disposition  Patient is not medically cleared.   Anticipate discharge to to skilled nursing facility.  I have placed the appropriate orders for post-discharge needs.    Review of Systems  Review of Systems   Constitutional: Positive for malaise/fatigue. Negative for fever and weight loss.   HENT: Negative for congestion and sore throat.    Eyes: Negative for blurred vision.   Respiratory: Positive for shortness of breath. Negative for cough.    Cardiovascular: Negative for chest pain and palpitations.   Gastrointestinal: Negative for nausea.   Genitourinary: Negative for dysuria.   Neurological: Positive for dizziness and weakness. Negative for tingling, tremors, sensory change, speech change, focal weakness, seizures, loss of consciousness and headaches.   Psychiatric/Behavioral: Negative for depression. The patient is not nervous/anxious.         Physical Exam  Temp:  [36.7 °C (98 °F)-37.1 °C (98.7 °F)] 37 °C (98.6 °F)  Pulse:  [] 60  Resp:  [18-22] 20  BP: (110-145)/(62-97) 145/70  SpO2:  [89 %-95 %] 89 %    Physical Exam  Constitutional:       Appearance: She is ill-appearing. She is not toxic-appearing.      Comments: cachectic elderly female   HENT:      Head: Normocephalic and atraumatic.      Mouth/Throat:      Mouth: Mucous membranes are dry.      Pharynx: Oropharynx is clear.      Comments: edentulous  Eyes:      General: No scleral icterus.     " Extraocular Movements: Extraocular movements intact.      Conjunctiva/sclera: Conjunctivae normal.   Cardiovascular:      Rate and Rhythm: Normal rate. Rhythm irregular.      Heart sounds: No murmur heard.     Pulmonary:      Effort: Respiratory distress present.      Breath sounds: Rales (RLL>LLL) present. No wheezing.      Comments: Increased respiratory effort, course breath sounds throughout  Abdominal:      General: There is no distension.      Palpations: Abdomen is soft.      Tenderness: There is no abdominal tenderness. There is no guarding.   Musculoskeletal:         General: No swelling or deformity.      Cervical back: Neck supple.      Right lower leg: No edema.      Left lower leg: No edema.   Skin:     Coloration: Skin is not jaundiced.      Findings: No bruising or lesion.   Neurological:      General: No focal deficit present.      Mental Status: She is alert and oriented to person, place, and time. Mental status is at baseline.      Cranial Nerves: No cranial nerve deficit.      Sensory: No sensory deficit.      Motor: Weakness present.   Psychiatric:         Mood and Affect: Mood normal.         Behavior: Behavior normal.         Thought Content: Thought content normal.         Fluids  No intake or output data in the 24 hours ending 09/20/21 1345    Laboratory  Recent Labs     09/19/21  0234   WBC 11.9*   RBC 4.38   HEMOGLOBIN 13.2   HEMATOCRIT 40.4   MCV 92.2   MCH 30.1   MCHC 32.7*   RDW 52.3*   PLATELETCT 111*   MPV 13.2*     Recent Labs     09/18/21  0443 09/19/21  0234   SODIUM 141 141   POTASSIUM 4.7 4.7   CHLORIDE 112 112   CO2 19* 18*   GLUCOSE 126* 126*   BUN 57* 50*   CREATININE 1.03 0.95   CALCIUM 8.4* 8.1*                   Imaging  DX-CHEST-LIMITED (1 VIEW)   Final Result      1.  New bibasilar pulmonary opacities which could be related to atelectasis and/or pneumonia.   2.  Small left pleural effusion.      MR-BRAIN-W/O   Final Result         No acute intracranial process.       Nonspecific deep white matter changes related to chronic microvascular ischemia. Remote left medial parietal lacunar infarct.      EC-ECHOCARDIOGRAM LTD W/O CONT   Final Result      DX-CHEST-LIMITED (1 VIEW)   Final Result         1.  Interstitial pulmonary parenchymal prominence suggest chronic underlying lung disease, component of interstitial edema and/or infiltrates not excluded.   2.  Small left pleural effusion   3.  Atherosclerosis      DX-CHEST-PORTABLE (1 VIEW)   Final Result         No acute cardiac or pulmonary abnormality is identified.           Assessment/Plan  * Dizziness- (present on admission)  Assessment & Plan  Patient states she has had dizziness for months, previous workup negative including orthostatic vitals, CT head and echocardiogram   Daniele/tachy on medical floor, pt transferred to [facility]   HR fluctuating between 60's-130's Discussed case with cardiology who recommended against treatment of HR    Will rehydrate   Repeat echo without acute pathology, EF 70%, no valvular pathology   MRI negative for acute infarct   Multifactorial including medications and age and possibly COVID   Supportive care   Fall precautions in place  SNF recommended, referral placed       Severe protein-calorie malnutrition (HCC)- (present on admission)  Assessment & Plan  Patient has severe protein calorie malnourishment  BMI of 13  Poor oral intake secondary to food tasting poorly, continue to encourage adequate nutrition provide patient preference if possible  -Dietary consulted  -Palliative consulted    Acute respiratory failure with hypoxia (HCC)- (present on admission)  Assessment & Plan  Acute respiratory failure on admission requiring 2 to 4 L of supplemental oxygen  Worsening respiratory failure now on 40L high flow suspect this is secondary to worsening COVID-19 infection  Patient is not vaccinated  -Currently on Decadron, pending CRP and lymphocyte count to determine if additional agent can be used,  appreciate ID input   -Chest x-ray does show increasing pulmonary infiltrates, procalcitonin is elevated but improved from admission we will not initiate antibiotics at this time  -RT protocol, wean O2 as able  -Encourage mobility and I-S and proning as tolerated    Chronic kidney disease (CKD) stage G3a/A1, moderately decreased glomerular filtration rate (GFR) between 45-59 mL/min/1.73 square meter and albuminuria creatinine ratio less than 30 mg/g ([facility])  Assessment & Plan  Around baseline  Labs daily  Avoid nephrotoxic medication    COPD (chronic obstructive pulmonary disease) ([facility])  Assessment & Plan  No exacerbation   Chest x-ray showed signs of emphysema with no infiltration on admit   Repeat xray 9/18 with b/l lower lobe infiltrates, suspect worsening COVID-19 PNA  Cont. with Spiriva and Symbicort  DuoNeb as needed    Pneumonia due to COVID-19 virus  Assessment & Plan  Came with coughing  Increasing oxygen needs, now on 15L mask   Decadron 6 mg for 10 days, start Remdesivir 9/19  Self prone encouraged  Continuing O2, wean as able   COVID precautions  Procal elevated but improving, will hold on abx at this time       Stage B ACC/AHA asymptomatic heart failure (HCC)- (present on admission)  Assessment & Plan  Patient has history of heart failure with ejection fraction 30%  Repeat echo in 4/2021 and showed improvement of ejection fraction 50%, repeat today shows 70%  EKG and troponin are negative for any ischemic changes  Holding lisinopril and metoprolol due to bradycardia and dizziness  Monitor   Given increasing ARF, will dc IVF and trial IV lasix    LBBB (left bundle branch block)- (present on admission)  Assessment & Plan  Chronic LBBB, tele showing in and out of A.fib   Patient started on Diltiazem with stabilization of heart rate   Started on Eliquis for stroke prevention  Cont. Tele monitoring     Essential hypertension- (present on admission)  Assessment & Plan  Her medication lisinopril metoprolol and  spironolactone were discontinued on admission  Since patient is on high risk for fall and came with dizziness we will continue holding the medications  Close monitoring  BP is stable without       Hyperlipidemia  Assessment & Plan  Continue lipitor 40 mg po qhs     Advanced care planning/counseling discussion  Assessment & Plan  Additional 20 minutes spent today 9/20 discussing advanced directives with patient.   States that she would like everything to be done at attempt to prolong her life.    She would like to be full code  Did consult palliative for further discussion given her age/health issues and decline, pt wishes to maintain FULL CODE status       VTE prophylaxis: SCDs/TEDs and therapeutic anticoagulation with eliquis    I have performed a physical exam and reviewed and updated ROS and Plan today (9/19/2021). In review of yesterday's note (9/18/2021), there are no changes except as documented above

## 2021-09-20 NOTE — CARE PLAN
Problem: Nutritional:  Goal: Nutrition support tolerated and meeting greater than 85% of estimated needs  Outcome: Not Met   See RD note.

## 2021-09-20 NOTE — DISCHARGE PLANNING
Anticipated Discharge Disposition: TBD    Action: Patient discussed in IDDR rounds. Patient now on 40LO2. Patient not medically clear. LSW will continue to follow. Patient may need LTAC due to O2 requirements.    Barriers to Discharge: High O2 requirements    Plan: LSW to follow and coordinate dc plan

## 2021-09-20 NOTE — PROGRESS NOTES
Cortrak Placement    Tube Team verified patient name and medical record number prior to tube placement.  Cortrak tube in left nare.  Per Cortrak picture, tube appears to be in the stomach.  Nursing Instructions: Awaiting KUB to confirm placement before use for medications or feeding. Once placement confirmed, flush tube with 30 ml of water, and then remove and save stylet, in patient medication drawer.       X-ray confirmed placement. Fibersource started at 15 ml/hr per order.

## 2021-09-20 NOTE — DIETARY
"Nutrition Support Assessment   Day 7 of admit.  Mayra Veloz is a 81 y.o. female with admitting DX of Pneumonia due to covid-19.     Current problem list:  1. Acute respiratory failure with hypoxia  2. Severe protein-calorie malnutrition  3. Pneumonia due to Covid-19.  4. CKD   5. CHF  6. HTN  7. Hyperlipidemia     Assessment:  Estimated Nutritional Needs: based on:   Height: 170.2 cm (5' 7.01\")  Weight: 47.1 kg (103 lb 13.4 oz)  Weight to Use in Calculations: 44.3 kg (97 lb 10.6 oz)  Body mass index is 16.26 kg/m²., BMI classification: Underweight    Calculation/Equation: MSJ x 1.2 - 1.5 = 1170 - 1462 kcal/day  Total Calories / day: 1178 - 1413 (Calories / k - 30)  Total Grams Protein / day: 47- 57 (Grams Protein / k.0 - 1.2)     Evaluation:   1. Patient with very poor oral intake for the last 7 days this admit.  Discussed POC with MD Dr. Cheema this afternoon and patient in agreement to start TF via Cortrak.  2. Level 7, easy to chew diet in place with boost plus BID and high protein milkshakes BID though patient is consuming < 25% of most meals and refusing many per RN.   3. Attempted to call patient today for assessment however she did not answer.   4. Lasix per MAR   5. No wounds per chart  6. Last BM  per chart review  7. Pertinent Labs: Glucose: 126, Bun: 50, GFR: 57, A1c : 6.4  8. Patient appropriate for TF @ 100% of estimated nutrition needs given refusal of PO diet.  Will monitor and wean TF as appropriate.      Malnutrition Risk: Patient at risk for malnutrition with refusal of meals and BMI of 16.  Unable to preform NFPE given Covid + status.     Recommendations/Plan:  1. Once cortrak placed and verified, start TF with Fibersource HN @ 15 ml/hr (~ 10 kcal/kg) given risk of refeeding syndrome. Maintain TF @ 15 ml/hr for initial 24 hours of infusion and monitor electrolytes for refeeding.   · If electrolytes WNL can advance by 10 ml/hr every 24 - 48 hours.  · Ultimate goal rate " to meet 100% of patients estimated nutrition needs is Fibersource HN @ 45 ml/hr which will provide: 1296 kcal/kg, 58 grams of protein and 875 ml free water.   2. Monitor for refeeding: Order BMP w/ Mg and Phos x 7 days. Replete K, Phos and Mg prn. Supplement 100 mg Thiamine x 7 days to reduce risk of refeeding.  Requested Thiamine supplement from MD Cheema.   3. Fluids per MD.  4. Consider appetite stimulant if medically appropriate.   5. Encourage PO intake.  6. Wean TF as oral intake improves.         RD following for POC.

## 2021-09-20 NOTE — PROGRESS NOTES
RRT paged t round on pt as pt is desating 80-83 and lips cyanotic. RRT returned paged advised place pt on non re breather and will be down.

## 2021-09-20 NOTE — PROGRESS NOTES
Rounding on pt and complaining of a right arm pain after inspection found tourniquet still tied to pt's arm. Arm cool and discolored, Charge RN notified and asked to come bedside

## 2021-09-21 PROBLEM — R73.03 PREDIABETES: Status: ACTIVE | Noted: 2021-01-01

## 2021-09-21 PROBLEM — R74.01 TRANSAMINITIS: Status: ACTIVE | Noted: 2021-01-01

## 2021-09-21 NOTE — PROGRESS NOTES
"Staff RN heard pt yelling from the hallway. Pt found sating low 80's maxed out on HF. NRB placed, RT and MD voalted Pt c/o pain with urination. Pt c/o \"I cant' breath\" with NRB on. Sats 88-90, pt tacypneic with increased WOB. RT to bedside. Pt instructed not to remove NRB mask. Ativan ordered. Charge RN updated    This RN called pt's son at bedside with Dr. Smith to discuss worsening oxygen needs and code status. The phone was held up to the pt's ear on speaker phone so that the pt could hear over the oxygen flow noise. Pt was confused and unable to hold the phone up. Pt's son Arden suddenly asked if \"anyone is hurting you there?\" This RN tried to reassure him that no one was hurting the pt. Arden became angry that another person was present and accused this RN of not letting him talk to his mom. Arden demanded this RN's last name and threatened to report the RN for mistreatment.This RN exited the room to have witnesses present for the remainder of the phone conversation. Arden stated repeatedly, \"If anyone hurts my mother ... ! This RN attempted to console and reassure Arden the pt was not being harmed. Arden hung up the phone. Charge RN updated, Dr. Smith present in [facility]  "

## 2021-09-21 NOTE — PROGRESS NOTES
Brief ID note:    -Baricitinib approval requested by Dr. Cheema  -Chart reviewed.  Patient with progressive COVID-19 pneumonia, now on HFNC  -Patient already on dexamethasone, anticoagulation with apixaban  -Initially, patient had absolute lymphocyte count <200 which was an exclusion criterion in the baricitinib study CoV-BARRIER. However, the lymphocyte count has now recovered  -Baricitinib approved per [facility] protocol.  Will initiate a 14-day course of renally dosed daily baricitinib  -Please provide the patient EUA fact sheet to patient or caregiver, available on [facility]  -Check baseline QuantiFERON gold, hepatitis B surface antigen, surface antibody, core antibody  -Check CBC with differential daily, CMP daily  -Discontinue baricitinib if:  • eGFR < 15 mL/min/1.73m2  • Hgb < 8 g/dL  • Significant immunosuppression: ALC < 200/uL, ANC < 1000/uL  • VTE in last 12 weeks or recurrent DVT/PE  • AST > 225 or ALT > 250 u/L    • Active serious infection other than COVID-19  -Okay to discontinue if cleared for discharge

## 2021-09-21 NOTE — PROGRESS NOTES
BSSR given to DANIEL Huitron.  POC and updated on shift events. Pt in bed comfortable and personal belongings and call light in reach.

## 2021-09-21 NOTE — THERAPY
Physical Therapy   Daily Treatment     Patient Name: Mayra Veloz  Age:  81 y.o., Sex:  female  Medical Record #: [id number]  Today's Date: 9/21/2021     Precautions  Precautions: Fall Risk    Assessment    Pt agreeable to mobility. SpO2 ranged from 86-93% on HFNC, would successfully recover with all rests and deep breathing. Pt was able to stand with therapist and take lateral side steps. Cues for sequencing and deep breathing. Continue to recommend placement. Acute PT to continue to follow.     Plan    Continue current treatment plan.    DC Equipment Recommendations: Unable to determine at this time  Discharge Recommendations: Recommend post-acute placement for additional physical therapy services prior to discharge home           Objective       09/21/21 1238   Cognition    Level of Consciousness Alert   Balance   Sitting Balance (Static) Fair   Sitting Balance (Dynamic) Fair -   Standing Balance (Static) Poor +   Standing Balance (Dynamic) Poor   Weight Shift Sitting Fair   Weight Shift Standing Poor   Comments HHA   Gait Analysis   Gait Level Of Assist Minimal Assist   Assistive Device Hand Held Assist   Distance (Feet) 5  (lateral side steps to L)   Deviation Bradykinetic;Shuffled Gait   Weight Bearing Status no restrictions   Bed Mobility    Supine to Sit Minimal Assist   Sit to Supine Moderate Assist   Functional Mobility   Sit to Stand Minimal Assist   Short Term Goals    Short Term Goal # 1 Pt will transition supine <> sit with spv within 6 visits in order to improve functional mobility.    Goal Outcome # 1 goal not met   Short Term Goal # 2 Pt will transition sit <> stand with FWW and spv within 6 visits in order to improve functional mobility.    Goal Outcome # 2 Goal not met   Short Term Goal # 3 Pt will ambulate 100ft with FWW and spv within 6 visits in order to improve functional mobility.    Goal Outcome # 3 Goal not met   Short Term Goal # 4 Pt will ascend/descend 4 stairs w/ R rail and spv  within 6 visits in order to access her home.    Goal Outcome # 4 Goal not met

## 2021-09-21 NOTE — PROGRESS NOTES
"Hospital Medicine Daily Progress Note    Date of Service  9/21/2021    Chief Complaint  Mayra Veloz is a 81 y.o. female admitted 9/13/2021 with coughing    Hospital Course  81-year-old female with history of COPD, heart failure hypertension and dyslipidemia presented 9/13 with coughing, also generalized weakness, denies significant fever or chills however she still having dizziness, labs did not show leukocytosis and kidney function around baseline, urine did not show any signs of infection, blood culture were negative however COVID-19 test was positive, chest x-ray did not show any infiltration, patient was diagnosed with acute respiratory failure with hypoxia, needed 2 L nasal cannula, dexamethasone was started    Recently patient was admitted for dizziness and vertigo for more than couple months during that admission othostatic was negative, CT head did not show any acute finding, CTA for chest did not show any PE, EKG was sinus rhythm and echo in 4/2021 showed ejection fraction 50% with grade 2 diastolic dysfunction and hypokinesis of basal to mid anteroseptal with a small aneurysm, patient still having this feeling with generalized weakness and feeling like her legs will give out however denied any chest pain or palpitation.  Patient did have MRI brain which showed no acute intracranial process, nonspecific deep white matter changes related to chronic microvascular ischemia, and remote left medial parietal lacunar infarct.      Patient was found to have emphysema and 3.5 mm right upper lobe pulmonary nodule on CT scan on 9/9, patient is to follow-up with PCP to repeat CT scan.      Interval Problem Update  - On 60L HFNC, on dex, ID consulted, started Baricitinib today, will also start lasix  - had long discussions with both patient and later with son regarding my concern for patient's resp status and GOC, when asked about intubation shyann reported \"well I don't know\", wanted to talk to her son, " patient's son coming in to see patient tomorrow and will have discussion again about code status, as of right now, full code, total time 20 minutes.   - CRP 10  - Afebrile  - procal downtrending without abx  - AST/ALT mild elevated    ADDENDUM:  Patient resp status worsening despite lasix, now only 83-86% on 60L HFNC with non-rebreather as well, per Rn having a lot of anxiety about non-rebreather, gave 0.5mg ativan to see if she would leave it on however unfortunately now very confused.  Again called patient's son to discuss her worsening status and prognosis, stressed importance that he either talk to her now or come in to see her, worried she will need intubation in next few hours or if GOC change comfort care.  Discussed with son that I didn't think she would survive if we did intubate her given her age, size and COPD.  Had nursing call son after I hung up to discuss more.  Also then paged on call intensivist (Dr. Smith) who also called son and had conversation with him.     I have personally seen and examined the patient at bedside. I discussed the plan of care with patient, bedside RN and charge RN.    Consultants/Specialty  cardiology    Code Status  Full Code    Disposition  Patient is not medically cleared.   Anticipate discharge to to skilled nursing facility.  I have placed the appropriate orders for post-discharge needs.    Review of Systems  Review of Systems   Constitutional: Positive for malaise/fatigue. Negative for fever and weight loss.   HENT: Negative for congestion and sore throat.    Eyes: Negative for blurred vision.   Respiratory: Positive for shortness of breath. Negative for cough.    Cardiovascular: Negative for chest pain and palpitations.   Gastrointestinal: Negative for nausea.   Genitourinary: Negative for dysuria.   Neurological: Positive for dizziness and weakness. Negative for tingling, tremors, sensory change, speech change, focal weakness, seizures, loss of consciousness and  headaches.   Psychiatric/Behavioral: Negative for depression. The patient is not nervous/anxious.         Physical Exam  Temp:  [36.3 °C (97.3 °F)-36.4 °C (97.6 °F)] 36.3 °C (97.4 °F)  Pulse:  [] 58  Resp:  [18-24] 18  BP: (131-157)/(80-97) 157/80  SpO2:  [87 %-93 %] 90 %    Physical Exam  Constitutional:       Appearance: She is ill-appearing. She is not toxic-appearing.      Comments: cachectic elderly female   HENT:      Head: Normocephalic and atraumatic.      Mouth/Throat:      Mouth: Mucous membranes are dry.      Pharynx: Oropharynx is clear.      Comments: edentulous  Eyes:      General: No scleral icterus.     Extraocular Movements: Extraocular movements intact.      Conjunctiva/sclera: Conjunctivae normal.   Cardiovascular:      Rate and Rhythm: Normal rate. Rhythm irregular.      Heart sounds: No murmur heard.     Pulmonary:      Effort: Respiratory distress present.      Breath sounds: Rales (RLL>LLL) present. No wheezing.      Comments: Increased respiratory effort, course breath sounds throughout  Abdominal:      General: There is no distension.      Palpations: Abdomen is soft.      Tenderness: There is no abdominal tenderness. There is no guarding.   Musculoskeletal:         General: No swelling or deformity.      Cervical back: Neck supple.      Right lower leg: No edema.      Left lower leg: No edema.   Skin:     Coloration: Skin is not jaundiced.      Findings: No bruising or lesion.   Neurological:      General: No focal deficit present.      Mental Status: She is alert and oriented to person, place, and time. Mental status is at baseline.      Cranial Nerves: No cranial nerve deficit.      Sensory: No sensory deficit.      Motor: Weakness present.   Psychiatric:         Mood and Affect: Mood normal.         Behavior: Behavior normal.         Thought Content: Thought content normal.         Fluids    Intake/Output Summary (Last 24 hours) at 9/21/2021 1511  Last data filed at 9/21/2021  0100  Gross per 24 hour   Intake 760 ml   Output 650 ml   Net 110 ml       Laboratory  Recent Labs     09/19/21  0234 09/20/21  1502 09/21/21  0311   WBC 11.9* 13.7* 13.4*   RBC 4.38 4.80 4.59   HEMOGLOBIN 13.2 14.2 13.7   HEMATOCRIT 40.4 44.4 42.0   MCV 92.2 92.5 91.5   MCH 30.1 29.6 29.8   MCHC 32.7* 32.0* 32.6*   RDW 52.3* 53.3* 51.8*   PLATELETCT 111* 123* 135*   MPV 13.2* 13.5* 14.2*     Recent Labs     09/19/21  0234 09/21/21  0311   SODIUM 141 145   POTASSIUM 4.7 5.2   CHLORIDE 112 113*   CO2 18* 18*   GLUCOSE 126* 159*   BUN 50* 51*   CREATININE 0.95 0.81   CALCIUM 8.1* 8.2*                   Imaging  DX-ABDOMEN FOR TUBE PLACEMENT   Final Result      1.  Enteric tube projects over the distal stomach.   2.  Small bilateral pleural effusions.   3.  Retrocardiac atelectasis/consolidation.   4.  Interstitial prominence likely represents interstitial edema.      DX-CHEST-LIMITED (1 VIEW)   Final Result      1.  New bibasilar pulmonary opacities which could be related to atelectasis and/or pneumonia.   2.  Small left pleural effusion.      MR-BRAIN-W/O   Final Result         No acute intracranial process.      Nonspecific deep white matter changes related to chronic microvascular ischemia. Remote left medial parietal lacunar infarct.      EC-ECHOCARDIOGRAM LTD W/O CONT   Final Result      DX-CHEST-LIMITED (1 VIEW)   Final Result         1.  Interstitial pulmonary parenchymal prominence suggest chronic underlying lung disease, component of interstitial edema and/or infiltrates not excluded.   2.  Small left pleural effusion   3.  Atherosclerosis      DX-CHEST-PORTABLE (1 VIEW)   Final Result         No acute cardiac or pulmonary abnormality is identified.           Assessment/Plan  * Pneumonia due to COVID-19 virus  Assessment & Plan  Came with coughing  Increasing oxygen needs, now on 60L HFNC  Decadron 6 mg for 10 days, Baracitinib 9/21  Self prone encouraged  Continuing O2, wean as able   COVID  precautions  Procal elevated but improving, will hold on abx at this time   Lasix 9/21      Dizziness- (present on admission)  Assessment & Plan  Patient states she has had dizziness for months, previous workup negative including orthostatic vitals, CT head and echocardiogram   Daniele/tachy on medical floor, pt transferred to tele   HR fluctuating between 60's-130's Discussed case with cardiology who recommended against treatment of HR    Will rehydrate   Repeat echo without acute pathology, EF 70%, no valvular pathology   MRI negative for acute infarct   Multifactorial including medications and age and possibly COVID   Supportive care   Fall precautions in place  SNF recommended, referral placed       Severe protein-calorie malnutrition (HCC)- (present on admission)  Assessment & Plan  Patient has severe protein calorie malnourishment  BMI of 13-->16  Poor oral intake secondary to food tasting poorly, continue to encourage adequate nutrition provide patient preference if possible  -Dietary consulted  -Palliative consulted    Acute respiratory failure with hypoxia (HCC)- (present on admission)  Assessment & Plan  Acute respiratory failure on admission requiring 2 to 4 L of supplemental oxygen  Worsening respiratory failure now on 60L high flow suspect this is secondary to worsening COVID-19 infection  Patient is not vaccinated  -Currently on Decadron x 10 days, Baracitinib 9/21-**  -Chest x-ray does show increasing pulmonary infiltrates, procalcitonin is elevated but improved from admission we will not initiate antibiotics at this time  -RT protocol, wean O2 as able  -Encourage mobility and I-S and proning as tolerated  - lasix IV 9/21  - Patient is critically ill, greater than 36 minutes of critical care time were spent in the monitoring resp status on HFNC, examining patient, chart review regarding resp history/status, planning, and management of this patient not including procedures without overlap.       Chronic  kidney disease (CKD) stage G3a/A1, moderately decreased glomerular filtration rate (GFR) between 45-59 mL/min/1.73 square meter and albuminuria creatinine ratio less than 30 mg/g ([facility])  Assessment & Plan  Around baseline  Labs daily  Avoid nephrotoxic medication    COPD (chronic obstructive pulmonary disease) ([facility])  Assessment & Plan  No exacerbation   Chest x-ray showed signs of emphysema with no infiltration on admit   Repeat xray 9/18 with b/l lower lobe infiltrates, suspect worsening COVID-19 PNA  Cont. with Spiriva and Symbicort  DuoNeb as needed    Stage B ACC/AHA asymptomatic heart failure (HCC)- (present on admission)  Assessment & Plan  Patient has history of heart failure with ejection fraction 30%  Repeat echo in 4/2021 and showed improvement of ejection fraction 50%, repeat today shows 70%  EKG and troponin are negative for any ischemic changes  Holding lisinopril and metoprolol due to bradycardia and dizziness  Monitor   Given increasing ARF, will dc IVF and trial IV lasix    LBBB (left bundle branch block)- (present on admission)  Assessment & Plan  Chronic LBBB, tele showing in and out of A.fib   Patient started on Diltiazem with stabilization of heart rate   Started on Eliquis for stroke prevention  Cont. Tele monitoring     Essential hypertension- (present on admission)  Assessment & Plan  Her medication lisinopril metoprolol and spironolactone were discontinued on admission  Since patient is on high risk for fall and came with dizziness we will continue holding the medications  Close monitoring  BP is stable without       Hyperlipidemia  Assessment & Plan  Continue lipitor 40 mg po qhs     Advanced care planning/counseling discussion  Assessment & Plan  Additional 20 minutes spent today 9/20 discussing advanced directives with patient.   States that she would like everything to be done at attempt to prolong her life.    She would like to be full code  Did consult palliative for further discussion  given her age/health issues and decline, pt wishes to maintain FULL CODE status    Another conversation 9/21 with patient and son (see above for details), total time = 20 min       VTE prophylaxis: SCDs/TEDs and therapeutic anticoagulation with eliquis    I have performed a physical exam and reviewed and updated ROS and Plan today (9/19/2021). In review of yesterday's note (9/18/2021), there are no changes except as documented above

## 2021-09-22 NOTE — DIETARY
Nutrition Services: Update   Day 9 of admit.  Mayra Veloz is a 81 y.o. female with admitting DX of Pneumonia due to COVID-19 virus.  Pt previously with a Cortrak in place which was removed 9/21 and per discussion w/ RN, no plan for reinsertion at this time and code status was changed.  Easy to chew, thin liquid diet in place with PO intake <25% thus far.  Oral nutrition supplements also in place to optimize intake.  Current clinical picture and MD progress notes reviewed.    Will continue to monitor at this time, though limited additional RD interventions.

## 2021-09-22 NOTE — PROGRESS NOTES
I was called to evaluate Ms. Veloz for worsening respiratory status. Ms Veloz is an 82 yo Female with h/o COPD, heart failure, htn, hld, CKD, who is admitted for respiratory failure 2/2 covid 19 pneumonia, severe protein calorie malnutrition. She has been on decadron, started on baricitinib toay, on apixaban. She is on max settings of HFNC and NRB on top, she is having more tachypnea, she is becoming encephalopathic and starting to desaturate into the low 80's.    I called the patient's son Arden and had a long discussion regarding goal sof care. I shared with him the information about her condition, how she has been progressing and how if she were to be intubated the unlikelihood that she would survive. Recent data has shown that with people aged 80 and over mortality for people on the vent with covid is essentially 100% and given her poor baseline health status and frailty she would be unlikely to survive and the life support would only prolong her suffering without benefit. He stated he wanted what's best for his mother and he didn't want her to suffer needlessly. I confirmed with him multiple times that he agrees intubation and CPR would not be in her best interest and I subsequently placed the DNR/I order and discussed with hospitalist and bedside RN. I told Arden I was concerned his mom might not make it through the night and is near the end of life.    Arden asked to speak with his mother which was attempted by holding the phone up to her ear. His mother was encephalopathic and unable to hold the phone, was minimally responsive and was confused talking about a grocery store and finding her shoes. Arden was frustrated and upset that his mom couldn't hold the phone on her own and became upset with the bedside RN, he felt RN was preventing him from speaking with his mother but this was not the case, she was just too sick to hold up the phone herself.     Naveed Smith M.D.       no

## 2021-09-22 NOTE — PROGRESS NOTES
Patient had pulled coretrak out during the day. Attempts to reinsert coretrak were unsuccessful because patient was unable to tolerate and would desat. MD notified and okay to leave coretrak out and hold PO meds.

## 2021-09-22 NOTE — PALLIATIVE CARE
Palliative Care follow-up  Attempted to call pts son, Arden, to discuss goals of care, no answer. Left message with request for call back, contact number provided.     Updated: Bedside RN Maris     Plan: Palliative RN will continue to attempt to reach pts son for further discussion     Thank you for allowing Palliative Care to participate in this patient's care. Please feel free to call [phone] with any questions or concerns.

## 2021-09-22 NOTE — PROGRESS NOTES
Telesitter in room for pulling lines/equipment. Pt pulling off HF tubing every 5-10 minutes, desating to 80. Pt not redirectable d/t confusion, oriented to self only.

## 2021-09-22 NOTE — PROGRESS NOTES
"Pt's son visiting at bedside. This RN updated him on pt's declining status. Dr. David spoke to Arden at bedside, pt made comfort care. RT and this RN placed pt on 6L NC, given IVP morphine for air hunger and agitation with good effect. IVF stopped.     Arden stated he will be leaving shortly. This RN informed him the pt would likely not live through the day. Arden understood and stated, \"She doesn't even know I'm here. I don't want to stay and see her like this.\" This RN conveyed understanding and support of his decision.     Pt sating in low 70's on .5L NC RR 14-18, maintained with morphine for air hunger. Large amount of secretions, atropine given. HR spiking to 170s, given ativan. T/R for comfort.       "

## 2021-09-22 NOTE — CARE PLAN
Problem: Nutritional:  Goal: Achieve adequate nutritional intake  Description: Patient will consume ~25-50% of meals and supplements  Outcome: Not Progressing

## 2021-09-22 NOTE — PALLIATIVE CARE
Palliative Care follow-up  Received update from pts bedside RN that Jerob son, Arden, is at the bedside. Arrived at the room, Dr David had extensive conversation with Arden and he elected for CC at this time.   Spoke with Arden and validated his feelings. He decline a visit from the  and professed no needs at this time.     Updated: Bedside RN Maris    Plan: Pt transitioned to CC     Thank you for allowing Palliative Care to participate in this patient's care. Please feel free to call [phone] with any questions or concerns.

## 2021-09-23 NOTE — PROGRESS NOTES
[facility] Medicine Daily Progress Note    Date of Service  9/23/2021    Chief Complaint  Mayra Veloz is a 81 y.o. female admitted 9/13/2021 with coughing    Hospital Course  81-year-old female with history of COPD, heart failure hypertension and dyslipidemia presented 9/13 with coughing, also generalized weakness, denies significant fever or chills however she still having dizziness, labs did not show leukocytosis and kidney function around baseline, urine did not show any signs of infection, blood culture were negative however COVID-19 test was positive, chest x-ray did not show any infiltration, patient was diagnosed with acute respiratory failure with hypoxia, needed 2 L nasal cannula, dexamethasone was started    Recently patient was admitted for dizziness and vertigo for more than couple months during that admission othostatic was negative, CT head did not show any acute finding, CTA for chest did not show any PE, EKG was sinus rhythm and echo in 4/2021 showed ejection fraction 50% with grade 2 diastolic dysfunction and hypokinesis of basal to mid anteroseptal with a small aneurysm, patient still having this feeling with generalized weakness and feeling like her legs will give out however denied any chest pain or palpitation.  Patient did have MRI brain which showed no acute intracranial process, nonspecific deep white matter changes related to chronic microvascular ischemia, and remote left medial parietal lacunar infarct.      Patient was found to have emphysema and 3.5 mm right upper lobe pulmonary nodule on CT scan on 9/9, patient is to follow-up with PCP to repeat CT scan.    Patient had worsening hypoxia on HFNC + non-rebreather, developed confusion/agitation, after multiple conversations with hospitalist, intensivist, palliative care and patient's son patient was transitioned to comfort care on 9/22.      Interval Problem Update  -on comfort care, on NC for comfort, HFNC removed  - getting  morphine for air hunger      I have personally seen and examined the patient at bedside. I discussed the plan of care with patient, family, bedside RN, charge RN, , pharmacy and palliative care.    Consultants/Specialty  cardiology, critical care and palliative care    Code Status  Comfort Care/DNR    Disposition  Patient is not medically cleared.   Anticipate discharge to to hospice.  I have placed the appropriate orders for post-discharge needs.    Review of Systems  Review of Systems   Unable to perform ROS: Mental acuity   Constitutional: Positive for malaise/fatigue. Negative for fever and weight loss.   HENT: Negative for congestion and sore throat.    Eyes: Negative for blurred vision.   Respiratory: Positive for shortness of breath. Negative for cough.    Cardiovascular: Negative for chest pain and palpitations.   Gastrointestinal: Negative for nausea.   Genitourinary: Negative for dysuria.   Neurological: Positive for dizziness and weakness. Negative for tingling, tremors, sensory change, speech change, focal weakness, seizures, loss of consciousness and headaches.   Psychiatric/Behavioral: Negative for depression. The patient is not nervous/anxious.         Physical Exam  Temp:  [36.1 °C (97 °F)] 36.1 °C (97 °F)  Pulse:  [51-72] 51  Resp:  [22] 22  BP: (106)/(59) 106/59  SpO2:  [62 %-70 %] 70 %    Physical Exam  Constitutional:       Appearance: She is ill-appearing. She is not toxic-appearing.      Comments: cachectic elderly female   HENT:      Head: Normocephalic and atraumatic.      Mouth/Throat:      Mouth: Mucous membranes are dry.      Pharynx: Oropharynx is clear.      Comments: edentulous  Eyes:      General: No scleral icterus.     Conjunctiva/sclera: Conjunctivae normal.   Cardiovascular:      Rate and Rhythm: Normal rate. Rhythm irregular.      Heart sounds: No murmur heard.   No friction rub. No gallop.    Pulmonary:      Effort: Respiratory distress present.      Breath sounds:  Rales (RLL>LLL) present. No wheezing.      Comments: Increased respiratory effort, course breath sounds throughout  Abdominal:      General: There is no distension.      Palpations: Abdomen is soft.      Tenderness: There is no abdominal tenderness. There is no guarding.   Musculoskeletal:         General: No swelling or deformity.      Right lower leg: No edema.      Left lower leg: No edema.   Skin:     Coloration: Skin is not jaundiced.      Findings: No bruising or lesion.   Neurological:      Mental Status: She is unresponsive.         Fluids    Intake/Output Summary (Last 24 hours) at 9/23/2021 1629  Last data filed at 9/23/2021 0600  Gross per 24 hour   Intake --   Output 200 ml   Net -200 ml       Laboratory  Recent Labs     09/21/21 0311 09/22/21  0216   WBC 13.4* 11.1*   RBC 4.59 4.08*   HEMOGLOBIN 13.7 12.0   HEMATOCRIT 42.0 37.3   MCV 91.5 91.4   MCH 29.8 29.4   MCHC 32.6* 32.2*   RDW 51.8* 51.9*   PLATELETCT 135* 145*   MPV 14.2* 14.4*     Recent Labs     09/21/21 0311 09/22/21  0216   SODIUM 145 148*   POTASSIUM 5.2 5.0   CHLORIDE 113* 117*   CO2 18* 18*   GLUCOSE 159* 120*   BUN 51* 61*   CREATININE 0.81 1.00   CALCIUM 8.2* 8.2*                   Imaging  DX-ABDOMEN FOR TUBE PLACEMENT   Final Result      1.  Enteric tube projects over the distal stomach.   2.  Small bilateral pleural effusions.   3.  Retrocardiac atelectasis/consolidation.   4.  Interstitial prominence likely represents interstitial edema.      DX-CHEST-LIMITED (1 VIEW)   Final Result      1.  New bibasilar pulmonary opacities which could be related to atelectasis and/or pneumonia.   2.  Small left pleural effusion.      MR-BRAIN-W/O   Final Result         No acute intracranial process.      Nonspecific deep white matter changes related to chronic microvascular ischemia. Remote left medial parietal lacunar infarct.      EC-ECHOCARDIOGRAM LTD W/O CONT   Final Result      DX-CHEST-LIMITED (1 VIEW)   Final Result         1.   Interstitial pulmonary parenchymal prominence suggest chronic underlying lung disease, component of interstitial edema and/or infiltrates not excluded.   2.  Small left pleural effusion   3.  Atherosclerosis      DX-CHEST-PORTABLE (1 VIEW)   Final Result         No acute cardiac or pulmonary abnormality is identified.           Assessment/Plan  * Pneumonia due to COVID-19 virus  Assessment & Plan  Came with coughing  Increasing oxygen needs, now on 60L HFNC  Decadron 6 mg for 10 days, Baracitinib 9/21  Self prone encouraged  Continuing O2, wean as able   COVID precautions  Procal elevated but improving, will hold on abx at this time   Lasix 9/21    Comfort care      Dizziness- (present on admission)  Assessment & Plan  Patient states she has had dizziness for months, previous workup negative including orthostatic vitals, CT head and echocardiogram   Daniele/tachy on medical floor, pt transferred to tele   HR fluctuating between 60's-130's Discussed case with cardiology who recommended against treatment of HR    Will rehydrate   Repeat echo without acute pathology, EF 70%, no valvular pathology   MRI negative for acute infarct   Multifactorial including medications and age and possibly COVID   Supportive care   Fall precautions in place  SNF recommended, referral placed       Prediabetes  Assessment & Plan  Monitor sugars on dexamethasone    Transaminitis  Assessment & Plan  AST/ALT/ALK Phos elevated, likely 2/2 COVID  Bili normal  CTM    Severe protein-calorie malnutrition (HCC)- (present on admission)  Assessment & Plan  Patient has severe protein calorie malnourishment  BMI of 13-->16  Poor oral intake secondary to food tasting poorly, continue to encourage adequate nutrition provide patient preference if possible  -Dietary consulted  -Palliative consulted    Acute respiratory failure with hypoxia (HCC)- (present on admission)  Assessment & Plan  Acute respiratory failure on admission requiring 2 to 4 L of  supplemental oxygen  Worsening respiratory failure now on 60L high flow suspect this is secondary to worsening COVID-19 infection  Patient is not vaccinated  -Currently on Decadron x 10 days, Baracitinib 9/21-**  -Chest x-ray does show increasing pulmonary infiltrates, procalcitonin is elevated but improved from admission we will not initiate antibiotics at this time  -RT protocol, wean O2 as able  -Encourage mobility and I-S and proning as tolerated  - lasix IV 9/21  - Patient is critically ill, greater than 36 minutes of critical care time were spent in the monitoring resp status on HFNC, examining patient, chart review regarding resp history/status, planning, and management of this patient not including procedures without overlap.       Chronic kidney disease (CKD) stage G3a/A1, moderately decreased glomerular filtration rate (GFR) between 45-59 mL/min/1.73 square meter and albuminuria creatinine ratio less than 30 mg/g ([facility])  Assessment & Plan  Around baseline  Labs daily  Avoid nephrotoxic medication    COPD (chronic obstructive pulmonary disease) ([facility])  Assessment & Plan  No exacerbation   Chest x-ray showed signs of emphysema with no infiltration on admit   Repeat xray 9/18 with b/l lower lobe infiltrates, suspect worsening COVID-19 PNA  Cont. with Spiriva and Symbicort  DuoNeb as needed    Stage B ACC/AHA asymptomatic heart failure (HCC)- (present on admission)  Assessment & Plan  Patient has history of heart failure with ejection fraction 30%  Repeat echo in 4/2021 and showed improvement of ejection fraction 50%, repeat today shows 70%  EKG and troponin are negative for any ischemic changes  Holding lisinopril and metoprolol due to bradycardia and dizziness  Monitor   Given increasing ARF, will dc IVF and trial IV lasix    Comfort care    LBBB (left bundle branch block)- (present on admission)  Assessment & Plan  Chronic LBBB, tele showing in and out of A.fib   Patient started on Diltiazem with  stabilization of heart rate   Started on Eliquis for stroke prevention  Cont. Tele monitoring     Essential hypertension- (present on admission)  Assessment & Plan  Her medication lisinopril metoprolol and spironolactone were discontinued on admission  Since patient is on high risk for fall and came with dizziness we will continue holding the medications  Close monitoring  BP is stable without       Hyperlipidemia  Assessment & Plan  Continue lipitor 40 mg po qhs     Advanced care planning/counseling discussion  Assessment & Plan  Additional 20 minutes spent today 9/20 discussing advanced directives with patient.   States that she would like everything to be done at attempt to prolong her life.    She would like to be full code  Did consult palliative for further discussion given her age/health issues and decline, pt wishes to maintain FULL CODE status    Another conversation 9/21 with patient and son (see above for details), total time = 20 min    Another conversation 9/22 with son, total time 20 minutes       VTE prophylaxis: comfort care    I have performed a physical exam and reviewed and updated ROS and Plan today (9/19/2021). In review of yesterday's note (9/18/2021), there are no changes except as documented above

## 2021-09-23 NOTE — THERAPY
Missed Therapy     Patient Name: Mayra Veloz  Age:  81 y.o., Sex:  female  Medical Record #: [id number]  Today's Date: 9/23/2021 09/23/21 0716   Interdisciplinary Plan of Care Collaboration   Collaboration Comments Pt has been transitioned to comfort care. No additional acute PT needs.

## 2021-09-23 NOTE — PALLIATIVE CARE
Palliative Care follow-up  Stopped by pts room, no family present. Pt currently resting comfortably. Spoke with Bedside RN Cassy who provided updated that pts son, Arden, has called in and stated he will be at the bedside later today.     Plan: Pt on CC, Palliative Care RN remains available for pt and family needs.     Thank you for allowing Palliative Care to participate in this patient's care. Please feel free to call [phone] with any questions or concerns.

## 2021-09-23 NOTE — PROGRESS NOTES
Hospital Medicine Daily Progress Note    Date of Service  9/22/2021    Chief Complaint  Mayra Veloz is a 81 y.o. female admitted 9/13/2021 with coughing    Hospital Course  81-year-old female with history of COPD, heart failure hypertension and dyslipidemia presented 9/13 with coughing, also generalized weakness, denies significant fever or chills however she still having dizziness, labs did not show leukocytosis and kidney function around baseline, urine did not show any signs of infection, blood culture were negative however COVID-19 test was positive, chest x-ray did not show any infiltration, patient was diagnosed with acute respiratory failure with hypoxia, needed 2 L nasal cannula, dexamethasone was started    Recently patient was admitted for dizziness and vertigo for more than couple months during that admission othostatic was negative, CT head did not show any acute finding, CTA for chest did not show any PE, EKG was sinus rhythm and echo in 4/2021 showed ejection fraction 50% with grade 2 diastolic dysfunction and hypokinesis of basal to mid anteroseptal with a small aneurysm, patient still having this feeling with generalized weakness and feeling like her legs will give out however denied any chest pain or palpitation.  Patient did have MRI brain which showed no acute intracranial process, nonspecific deep white matter changes related to chronic microvascular ischemia, and remote left medial parietal lacunar infarct.      Patient was found to have emphysema and 3.5 mm right upper lobe pulmonary nodule on CT scan on 9/9, patient is to follow-up with PCP to repeat CT scan.    Patient had worsening hypoxia on HFNC + non-rebreather, developed confusion/agitation, after multiple conversations with hospitalist, intensivist, palliative care and patient's son patient was transitioned to comfort care on 9/22.      Interval Problem Update  - worsening hypoxia on 60L HFNC + non-rebreather, oxygen saturation  70s-80s, son at bedside, discussed GOC and poor prognosis, patient's son stated he didn't want her to be in any pain, discussed comfort care measures and son agreed.  Transitioned to comfort care.  Total time 20 minutes.        I have personally seen and examined the patient at bedside. I discussed the plan of care with patient, family, bedside RN, charge RN and .    Consultants/Specialty  cardiology, critical care and palliative care    Code Status  Comfort Care/DNR    Disposition  Patient is not medically cleared.   Anticipate discharge to to skilled nursing facility.  I have placed the appropriate orders for post-discharge needs.    Review of Systems  Review of Systems   Unable to perform ROS: Mental acuity   Constitutional: Positive for malaise/fatigue. Negative for fever and weight loss.   HENT: Negative for congestion and sore throat.    Eyes: Negative for blurred vision.   Respiratory: Positive for shortness of breath. Negative for cough.    Cardiovascular: Negative for chest pain and palpitations.   Gastrointestinal: Negative for nausea.   Genitourinary: Negative for dysuria.   Neurological: Positive for dizziness and weakness. Negative for tingling, tremors, sensory change, speech change, focal weakness, seizures, loss of consciousness and headaches.   Psychiatric/Behavioral: Negative for depression. The patient is not nervous/anxious.         Physical Exam  Temp:  [36.6 °C (97.8 °F)-37.3 °C (99.1 °F)] 37.3 °C (99.1 °F)  Pulse:  [] 149  Resp:  [16-24] 20  BP: (123-162)/() 123/83  SpO2:  [78 %-97 %] 91 %    Physical Exam  Constitutional:       General: She is in acute distress.      Appearance: She is ill-appearing. She is not toxic-appearing.      Comments: cachectic elderly female   HENT:      Head: Normocephalic and atraumatic.      Mouth/Throat:      Mouth: Mucous membranes are dry.      Pharynx: Oropharynx is clear.      Comments: edentulous  Eyes:      General: No scleral  icterus.     Conjunctiva/sclera: Conjunctivae normal.   Cardiovascular:      Rate and Rhythm: Tachycardia present. Rhythm irregular.      Heart sounds: No murmur heard.     Pulmonary:      Effort: Respiratory distress present.      Breath sounds: Rales (RLL>LLL) present. No wheezing.      Comments: Increased respiratory effort, course breath sounds throughout  Abdominal:      General: There is no distension.      Palpations: Abdomen is soft.      Tenderness: There is no abdominal tenderness. There is no guarding.   Musculoskeletal:         General: No swelling or deformity.      Right lower leg: No edema.      Left lower leg: No edema.   Skin:     Coloration: Skin is not jaundiced.      Findings: No bruising or lesion.   Neurological:      Mental Status: She is disoriented and unresponsive.         Fluids    Intake/Output Summary (Last 24 hours) at 9/22/2021 1802  Last data filed at 9/22/2021 0930  Gross per 24 hour   Intake --   Output 2100 ml   Net -2100 ml       Laboratory  Recent Labs     09/20/21  1502 09/21/21  0311 09/22/21  0216   WBC 13.7* 13.4* 11.1*   RBC 4.80 4.59 4.08*   HEMOGLOBIN 14.2 13.7 12.0   HEMATOCRIT 44.4 42.0 37.3   MCV 92.5 91.5 91.4   MCH 29.6 29.8 29.4   MCHC 32.0* 32.6* 32.2*   RDW 53.3* 51.8* 51.9*   PLATELETCT 123* 135* 145*   MPV 13.5* 14.2* 14.4*     Recent Labs     09/21/21  0311 09/22/21  0216   SODIUM 145 148*   POTASSIUM 5.2 5.0   CHLORIDE 113* 117*   CO2 18* 18*   GLUCOSE 159* 120*   BUN 51* 61*   CREATININE 0.81 1.00   CALCIUM 8.2* 8.2*                   Imaging  DX-ABDOMEN FOR TUBE PLACEMENT   Final Result      1.  Enteric tube projects over the distal stomach.   2.  Small bilateral pleural effusions.   3.  Retrocardiac atelectasis/consolidation.   4.  Interstitial prominence likely represents interstitial edema.      DX-CHEST-LIMITED (1 VIEW)   Final Result      1.  New bibasilar pulmonary opacities which could be related to atelectasis and/or pneumonia.   2.  Small left  pleural effusion.      MR-BRAIN-W/O   Final Result         No acute intracranial process.      Nonspecific deep white matter changes related to chronic microvascular ischemia. Remote left medial parietal lacunar infarct.      EC-ECHOCARDIOGRAM LTD W/O CONT   Final Result      DX-CHEST-LIMITED (1 VIEW)   Final Result         1.  Interstitial pulmonary parenchymal prominence suggest chronic underlying lung disease, component of interstitial edema and/or infiltrates not excluded.   2.  Small left pleural effusion   3.  Atherosclerosis      DX-CHEST-PORTABLE (1 VIEW)   Final Result         No acute cardiac or pulmonary abnormality is identified.           Assessment/Plan  * Pneumonia due to COVID-19 virus  Assessment & Plan  Came with coughing  Increasing oxygen needs, now on 60L HFNC  Decadron 6 mg for 10 days, Baracitinib 9/21  Self prone encouraged  Continuing O2, wean as able   COVID precautions  Procal elevated but improving, will hold on abx at this time   Lasix 9/21    Comfort care      Dizziness- (present on admission)  Assessment & Plan  Patient states she has had dizziness for months, previous workup negative including orthostatic vitals, CT head and echocardiogram   Daniele/tachy on medical floor, pt transferred to tele   HR fluctuating between 60's-130's Discussed case with cardiology who recommended against treatment of HR    Will rehydrate   Repeat echo without acute pathology, EF 70%, no valvular pathology   MRI negative for acute infarct   Multifactorial including medications and age and possibly COVID   Supportive care   Fall precautions in place  SNF recommended, referral placed       Prediabetes  Assessment & Plan  Monitor sugars on dexamethasone    Transaminitis  Assessment & Plan  AST/ALT/ALK Phos elevated, likely 2/2 COVID  Bili normal  CTM    Severe protein-calorie malnutrition (HCC)- (present on admission)  Assessment & Plan  Patient has severe protein calorie malnourishment  BMI of 13-->16  Poor  oral intake secondary to food tasting poorly, continue to encourage adequate nutrition provide patient preference if possible  -Dietary consulted  -Palliative consulted    Acute respiratory failure with hypoxia (HCC)- (present on admission)  Assessment & Plan  Acute respiratory failure on admission requiring 2 to 4 L of supplemental oxygen  Worsening respiratory failure now on 60L high flow suspect this is secondary to worsening COVID-19 infection  Patient is not vaccinated  -Currently on Decadron x 10 days, Baracitinib 9/21-**  -Chest x-ray does show increasing pulmonary infiltrates, procalcitonin is elevated but improved from admission we will not initiate antibiotics at this time  -RT protocol, wean O2 as able  -Encourage mobility and I-S and proning as tolerated  - lasix IV 9/21  - Patient is critically ill, greater than 36 minutes of critical care time were spent in the monitoring resp status on HFNC, examining patient, chart review regarding resp history/status, planning, and management of this patient not including procedures without overlap.       Chronic kidney disease (CKD) stage G3a/A1, moderately decreased glomerular filtration rate (GFR) between 45-59 mL/min/1.73 square meter and albuminuria creatinine ratio less than 30 mg/g (HCC)  Assessment & Plan  Around baseline  Labs daily  Avoid nephrotoxic medication    COPD (chronic obstructive pulmonary disease) (HCC)  Assessment & Plan  No exacerbation   Chest x-ray showed signs of emphysema with no infiltration on admit   Repeat xray 9/18 with b/l lower lobe infiltrates, suspect worsening COVID-19 PNA  Cont. with Spiriva and Symbicort  DuoNeb as needed    Stage B ACC/AHA asymptomatic heart failure (HCC)- (present on admission)  Assessment & Plan  Patient has history of heart failure with ejection fraction 30%  Repeat echo in 4/2021 and showed improvement of ejection fraction 50%, repeat today shows 70%  EKG and troponin are negative for any ischemic  changes  Holding lisinopril and metoprolol due to bradycardia and dizziness  Monitor   Given increasing ARF, will dc IVF and trial IV lasix    Comfort care    LBBB (left bundle branch block)- (present on admission)  Assessment & Plan  Chronic LBBB, tele showing in and out of A.fib   Patient started on Diltiazem with stabilization of heart rate   Started on Eliquis for stroke prevention  Cont. Tele monitoring     Essential hypertension- (present on admission)  Assessment & Plan  Her medication lisinopril metoprolol and spironolactone were discontinued on admission  Since patient is on high risk for fall and came with dizziness we will continue holding the medications  Close monitoring  BP is stable without       Hyperlipidemia  Assessment & Plan  Continue lipitor 40 mg po qhs     Advanced care planning/counseling discussion  Assessment & Plan  Additional 20 minutes spent today 9/20 discussing advanced directives with patient.   States that she would like everything to be done at attempt to prolong her life.    She would like to be full code  Did consult palliative for further discussion given her age/health issues and decline, pt wishes to maintain FULL CODE status    Another conversation 9/21 with patient and son (see above for details), total time = 20 min    Another conversation 9/22 with son, total time 20 minutes       VTE prophylaxis: comfort care    I have performed a physical exam and reviewed and updated ROS and Plan today (9/19/2021). In review of yesterday's note (9/18/2021), there are no changes except as documented above

## 2021-09-24 NOTE — PROGRESS NOTES
[facility] Medicine Daily Progress Note    Date of Service  9/24/2021    Chief Complaint  Mayra Veloz is a 81 y.o. female admitted 9/13/2021 with coughing    Hospital Course  81-year-old female with history of COPD, heart failure hypertension and dyslipidemia presented 9/13 with coughing, also generalized weakness, denies significant fever or chills however she still having dizziness, labs did not show leukocytosis and kidney function around baseline, urine did not show any signs of infection, blood culture were negative however COVID-19 test was positive, chest x-ray did not show any infiltration, patient was diagnosed with acute respiratory failure with hypoxia, needed 2 L nasal cannula, dexamethasone was started    Recently patient was admitted for dizziness and vertigo for more than couple months during that admission othostatic was negative, CT head did not show any acute finding, CTA for chest did not show any PE, EKG was sinus rhythm and echo in 4/2021 showed ejection fraction 50% with grade 2 diastolic dysfunction and hypokinesis of basal to mid anteroseptal with a small aneurysm, patient still having this feeling with generalized weakness and feeling like her legs will give out however denied any chest pain or palpitation.  Patient did have MRI brain which showed no acute intracranial process, nonspecific deep white matter changes related to chronic microvascular ischemia, and remote left medial parietal lacunar infarct.      Patient was found to have emphysema and 3.5 mm right upper lobe pulmonary nodule on CT scan on 9/9, patient is to follow-up with PCP to repeat CT scan.    Patient had worsening hypoxia on HFNC + non-rebreather, developed confusion/agitation, after multiple conversations with hospitalist, intensivist, palliative care and patient's son patient was transitioned to comfort care on 9/22.      Interval Problem Update  - On comfort care  - On 1L NC, oxygen saturation actually  improving  - daughter at bedside, answered questions      I have personally seen and examined the patient at bedside. I discussed the plan of care with patient, family, bedside RN, charge RN,  and pharmacy.    Consultants/Specialty  cardiology, critical care and palliative care    Code Status  Comfort Care/DNR    Disposition  Patient is not medically cleared.   Anticipate discharge to to hospice.  I have placed the appropriate orders for post-discharge needs.    Review of Systems  Review of Systems   Unable to perform ROS: Mental acuity   Constitutional: Positive for malaise/fatigue. Negative for fever and weight loss.   HENT: Negative for congestion and sore throat.    Eyes: Negative for blurred vision.   Respiratory: Positive for shortness of breath. Negative for cough.    Cardiovascular: Negative for chest pain and palpitations.   Gastrointestinal: Negative for nausea.   Genitourinary: Negative for dysuria.   Neurological: Positive for dizziness and weakness. Negative for tingling, tremors, sensory change, speech change, focal weakness, seizures, loss of consciousness and headaches.   Psychiatric/Behavioral: Negative for depression. The patient is not nervous/anxious.         Physical Exam  Temp:  [36.4 °C (97.5 °F)] 36.4 °C (97.5 °F)  Pulse:  [56-59] 59  Resp:  [13-18] 13  BP: (103-114)/(56-65) 114/65  SpO2:  [68 %-90 %] 90 %    Physical Exam  Constitutional:       Appearance: She is ill-appearing. She is not toxic-appearing.      Comments: cachectic elderly female   HENT:      Head: Normocephalic and atraumatic.      Mouth/Throat:      Mouth: Mucous membranes are dry.      Pharynx: Oropharynx is clear.      Comments: edentulous  Cardiovascular:      Rate and Rhythm: Bradycardia present.   Pulmonary:      Effort: No respiratory distress.      Comments: Increased respiratory effort, course breath sounds throughout  Abdominal:      General: There is no distension.      Palpations: Abdomen is soft.       Tenderness: There is no abdominal tenderness. There is no guarding.   Musculoskeletal:         General: Swelling (right hand) present.      Right lower leg: No edema.      Left lower leg: No edema.   Skin:     Coloration: Skin is not jaundiced.      Findings: No bruising or lesion.   Neurological:      Mental Status: She is unresponsive.         Fluids  No intake or output data in the 24 hours ending 09/24/21 1601    Laboratory  Recent Labs     09/22/21  0216   WBC 11.1*   RBC 4.08*   HEMOGLOBIN 12.0   HEMATOCRIT 37.3   MCV 91.4   MCH 29.4   MCHC 32.2*   RDW 51.9*   PLATELETCT 145*   MPV 14.4*     Recent Labs     09/22/21  0216   SODIUM 148*   POTASSIUM 5.0   CHLORIDE 117*   CO2 18*   GLUCOSE 120*   BUN 61*   CREATININE 1.00   CALCIUM 8.2*                   Imaging  DX-ABDOMEN FOR TUBE PLACEMENT   Final Result      1.  Enteric tube projects over the distal stomach.   2.  Small bilateral pleural effusions.   3.  Retrocardiac atelectasis/consolidation.   4.  Interstitial prominence likely represents interstitial edema.      DX-CHEST-LIMITED (1 VIEW)   Final Result      1.  New bibasilar pulmonary opacities which could be related to atelectasis and/or pneumonia.   2.  Small left pleural effusion.      MR-BRAIN-W/O   Final Result         No acute intracranial process.      Nonspecific deep white matter changes related to chronic microvascular ischemia. Remote left medial parietal lacunar infarct.      EC-ECHOCARDIOGRAM LTD W/O CONT   Final Result      DX-CHEST-LIMITED (1 VIEW)   Final Result         1.  Interstitial pulmonary parenchymal prominence suggest chronic underlying lung disease, component of interstitial edema and/or infiltrates not excluded.   2.  Small left pleural effusion   3.  Atherosclerosis      DX-CHEST-PORTABLE (1 VIEW)   Final Result         No acute cardiac or pulmonary abnormality is identified.           Assessment/Plan  * Pneumonia due to COVID-19 virus  Assessment & Plan  Came with  coughing  Increasing oxygen needs, now on 60L HFNC  Decadron 6 mg for 10 days, Baracitinib 9/21  Self prone encouraged  Continuing O2, wean as able   COVID precautions  Procal elevated but improving, will hold on abx at this time   Lasix 9/21    Comfort care      Dizziness- (present on admission)  Assessment & Plan  Patient states she has had dizziness for months, previous workup negative including orthostatic vitals, CT head and echocardiogram   Daniele/tachy on medical floor, pt transferred to tele   HR fluctuating between 60's-130's Discussed case with cardiology who recommended against treatment of HR    Will rehydrate   Repeat echo without acute pathology, EF 70%, no valvular pathology   MRI negative for acute infarct   Multifactorial including medications and age and possibly COVID   Supportive care   Fall precautions in place  SNF recommended, referral placed       Prediabetes  Assessment & Plan  Monitor sugars on dexamethasone    Transaminitis  Assessment & Plan  AST/ALT/ALK Phos elevated, likely 2/2 COVID  Bili normal  CTM    Severe protein-calorie malnutrition (HCC)- (present on admission)  Assessment & Plan  Patient has severe protein calorie malnourishment  BMI of 13-->16  Poor oral intake secondary to food tasting poorly, continue to encourage adequate nutrition provide patient preference if possible  -Dietary consulted  -Palliative consulted    Acute respiratory failure with hypoxia (HCC)- (present on admission)  Assessment & Plan  Acute respiratory failure on admission requiring 2 to 4 L of supplemental oxygen  Worsening respiratory failure now on 60L high flow suspect this is secondary to worsening COVID-19 infection  Patient is not vaccinated  -Currently on Decadron x 10 days, Baracitinib 9/21-**  -Chest x-ray does show increasing pulmonary infiltrates, procalcitonin is elevated but improved from admission we will not initiate antibiotics at this time  -RT protocol, wean O2 as able  -Encourage  mobility and I-S and proning as tolerated  - lasix IV 9/21  - Patient is critically ill, greater than 36 minutes of critical care time were spent in the monitoring resp status on HFNC, examining patient, chart review regarding resp history/status, planning, and management of this patient not including procedures without overlap.       Chronic kidney disease (CKD) stage G3a/A1, moderately decreased glomerular filtration rate (GFR) between 45-59 mL/min/1.73 square meter and albuminuria creatinine ratio less than 30 mg/g ([facility])  Assessment & Plan  Around baseline  Labs daily  Avoid nephrotoxic medication    COPD (chronic obstructive pulmonary disease) ([facility])  Assessment & Plan  No exacerbation   Chest x-ray showed signs of emphysema with no infiltration on admit   Repeat xray 9/18 with b/l lower lobe infiltrates, suspect worsening COVID-19 PNA  Cont. with Spiriva and Symbicort  DuoNeb as needed    Stage B ACC/AHA asymptomatic heart failure (HCC)- (present on admission)  Assessment & Plan  Patient has history of heart failure with ejection fraction 30%  Repeat echo in 4/2021 and showed improvement of ejection fraction 50%, repeat today shows 70%  EKG and troponin are negative for any ischemic changes  Holding lisinopril and metoprolol due to bradycardia and dizziness  Monitor   Given increasing ARF, will dc IVF and trial IV lasix    Comfort care    LBBB (left bundle branch block)- (present on admission)  Assessment & Plan  Chronic LBBB, tele showing in and out of A.fib   Patient started on Diltiazem with stabilization of heart rate   Started on Eliquis for stroke prevention  Cont. Tele monitoring     Essential hypertension- (present on admission)  Assessment & Plan  Her medication lisinopril metoprolol and spironolactone were discontinued on admission  Since patient is on high risk for fall and came with dizziness we will continue holding the medications  Close monitoring  BP is stable without        Hyperlipidemia  Assessment & Plan  Continue lipitor 40 mg po qhs     Advanced care planning/counseling discussion  Assessment & Plan  Additional 20 minutes spent today 9/20 discussing advanced directives with patient.   States that she would like everything to be done at attempt to prolong her life.    She would like to be full code  Did consult palliative for further discussion given her age/health issues and decline, pt wishes to maintain FULL CODE status    Another conversation 9/21 with patient and son (see above for details), total time = 20 min    Another conversation 9/22 with son, total time 10 minutes       VTE prophylaxis: comfort care    I have performed a physical exam and reviewed and updated ROS and Plan today (9/19/2021). In review of yesterday's note (9/18/2021), there are no changes except as documented above

## 2021-09-24 NOTE — CARE PLAN
The patient is Watcher - Medium risk of patient condition declining or worsening    Shift Goals  Clinical Goals: cpmfort and O2 management  Patient Goals: rest/comfort  Family Goals: N/A    Progress made toward(s) clinical / shift goals:  Yes    Problem: Skin Integrity  Goal: Skin integrity is maintained or improved  Outcome: Progressing     Problem: Pain - Standard  Goal: Alleviation of pain or a reduction in pain to the patient’s comfort goal  Outcome: Progressing       Patient is not progressing towards the following goals:

## 2021-09-24 NOTE — DISCHARGE PLANNING
Anticipated Discharge Disposition: TBD    Action: Patient discussed in IDDR rounds. Patient is on comfort care and on 1L O2 for comfort. LSW to continue to follow.    Barriers to Discharge: TBD    Plan: LSW to continue to follow

## 2021-09-25 NOTE — PROGRESS NOTES
: Pt observed to have 4/min respirations, held admin of morphine,    : Pronounced , RN Samantha and DANIEL Reyna.     : Appropriate steps taken for     : Discission with Arden pt's son about next steps. Stated family will not need to come to bedside.

## 2021-09-25 NOTE — PROGRESS NOTES
COVID surge in effect. Report received from RN. Pt observed sleeping, agonol respirations at 30/min. Bed locked and in low position, with call light and appropriate belongings within reach. All needs and requirements met at this time.

## 2021-09-25 NOTE — DISCHARGE SUMMARY
Death Summary    Cause of Death  Respiratory failure due to COVID-19 pneumonia.    Comorbid Conditions at the Time of Death  Principal Problem:    Pneumonia due to COVID-19 virus POA: Unknown  Active Problems:    Dizziness POA: Yes    Advanced care planning/counseling discussion POA: Unknown    Hyperlipidemia POA: Unknown    Essential hypertension POA: Yes    LBBB (left bundle branch block) POA: Yes    Stage B ACC/AHA asymptomatic heart failure (HCC) POA: Yes    COPD (chronic obstructive pulmonary disease) (HCC) POA: Unknown    Chronic kidney disease (CKD) stage G3a/A1, moderately decreased glomerular filtration rate (GFR) between 45-59 mL/min/1.73 square meter and albuminuria creatinine ratio less than 30 mg/g (HCC) POA: Unknown    Acute respiratory failure with hypoxia (HCC) POA: Yes    Severe protein-calorie malnutrition (HCC) POA: Yes    Transaminitis POA: Unknown    Prediabetes POA: Unknown  Resolved Problems:    * No resolved hospital problems. *      History of Presenting Illness and Hospital Course  81-year-old female with history of COPD, heart failure hypertension and dyslipidemia presented 9/13 with coughing, also generalized weakness, denies significant fever or chills however she still having dizziness, labs did not show leukocytosis and kidney function around baseline, urine did not show any signs of infection, blood culture were negative however COVID-19 test was positive, chest x-ray did not show any infiltration, patient was diagnosed with acute respiratory failure with hypoxia, needed 2 L nasal cannula, dexamethasone was started     Recently patient was admitted for dizziness and vertigo for more than couple months during that admission othostatic was negative, CT head did not show any acute finding, CTA for chest did not show any PE, EKG was sinus rhythm and echo in 4/2021 showed ejection fraction 50% with grade 2 diastolic dysfunction and hypokinesis of basal to mid anteroseptal with a small  aneurysm, patient still having this feeling with generalized weakness and feeling like her legs will give out however denied any chest pain or palpitation.  Patient did have MRI brain which showed no acute intracranial process, nonspecific deep white matter changes related to chronic microvascular ischemia, and remote left medial parietal lacunar infarct.  Patient was found to have emphysema and 3.5 mm right upper lobe pulmonary nodule on CT scan on 9/9.  She was also found to be COVID +.  She was treated with dexamethasone and baracitinib.       Patient had worsening hypoxia on HFNC + non-rebreather, developed confusion/agitation, after multiple conversations with hospitalist, intensivist, palliative care and patient's son patient was transitioned to comfort care on 9/22.       Death Date: 09/24/21   Death Time: 2100         Pronounced By (RN1): Samantha  Pronounced By (RN2): Axel

## 2021-09-26 DIAGNOSIS — I50.9 STAGE B ACC/AHA ASYMPTOMATIC HEART FAILURE (HCC): ICD-10-CM

## 2021-09-26 DIAGNOSIS — I10 ESSENTIAL HYPERTENSION: ICD-10-CM

## 2021-09-26 RX ORDER — SPIRONOLACTONE 25 MG/1
TABLET ORAL
Qty: 90 TABLET | Refills: 2 | OUTPATIENT
Start: 2021-09-26
